# Patient Record
Sex: MALE | Race: WHITE | Employment: OTHER | URBAN - METROPOLITAN AREA
[De-identification: names, ages, dates, MRNs, and addresses within clinical notes are randomized per-mention and may not be internally consistent; named-entity substitution may affect disease eponyms.]

---

## 2017-10-06 PROBLEM — K04.01 CHRONIC PULPITIS: Chronic | Status: ACTIVE | Noted: 2017-10-06

## 2017-10-06 PROBLEM — K08.89 ODONTALGIA: Chronic | Status: ACTIVE | Noted: 2017-10-06

## 2017-10-06 PROBLEM — K02.9 DENTAL CARIES EXTENDING INTO PULP: Chronic | Status: ACTIVE | Noted: 2017-10-06

## 2018-08-01 ENCOUNTER — OFFICE VISIT (OUTPATIENT)
Dept: CARDIOLOGY CLINIC | Age: 68
End: 2018-08-01
Payer: MEDICARE

## 2018-08-01 VITALS
HEIGHT: 71 IN | SYSTOLIC BLOOD PRESSURE: 98 MMHG | RESPIRATION RATE: 16 BRPM | DIASTOLIC BLOOD PRESSURE: 60 MMHG | HEART RATE: 61 BPM | BODY MASS INDEX: 27.83 KG/M2 | WEIGHT: 198.8 LBS

## 2018-08-01 DIAGNOSIS — I51.9 HEART PROBLEM: Primary | ICD-10-CM

## 2018-08-01 PROCEDURE — 1101F PT FALLS ASSESS-DOCD LE1/YR: CPT | Performed by: INTERNAL MEDICINE

## 2018-08-01 PROCEDURE — 1036F TOBACCO NON-USER: CPT | Performed by: INTERNAL MEDICINE

## 2018-08-01 PROCEDURE — 93000 ELECTROCARDIOGRAM COMPLETE: CPT | Performed by: INTERNAL MEDICINE

## 2018-08-01 PROCEDURE — 99214 OFFICE O/P EST MOD 30 MIN: CPT | Performed by: INTERNAL MEDICINE

## 2018-08-01 PROCEDURE — G8598 ASA/ANTIPLAT THER USED: HCPCS | Performed by: INTERNAL MEDICINE

## 2018-08-01 PROCEDURE — G8417 CALC BMI ABV UP PARAM F/U: HCPCS | Performed by: INTERNAL MEDICINE

## 2018-08-01 PROCEDURE — 3017F COLORECTAL CA SCREEN DOC REV: CPT | Performed by: INTERNAL MEDICINE

## 2018-08-01 PROCEDURE — G8427 DOCREV CUR MEDS BY ELIG CLIN: HCPCS | Performed by: INTERNAL MEDICINE

## 2018-08-01 PROCEDURE — 4040F PNEUMOC VAC/ADMIN/RCVD: CPT | Performed by: INTERNAL MEDICINE

## 2018-08-01 PROCEDURE — 1123F ACP DISCUSS/DSCN MKR DOCD: CPT | Performed by: INTERNAL MEDICINE

## 2018-08-01 NOTE — PROGRESS NOTES
CHIEF COMPLAINT: CAD, CP and SOB    HPI: Patient is a 69 y.o. male seen at the request of Cici Agosto DO. Patient with history of CAD having had BMS to RCA 12/03. Patient seen in follow up. No CP or SOB. Past Medical History:   Diagnosis Date    Allergic rhinitis     Arthritis     Baker's cyst of knee     right    CAD (coronary artery disease)     follows with Dr. Lynn Bird caries     Depression     [facility]) [facility]     GERD (gastroesophageal reflux disease)     Heart murmur     Hyperlipidemia     Hypertension     Intellectual delay     able to write name, limited reading / signs for self    PONV (postoperative nausea and vomiting)     Preoperative clearance 08/01/2017    cardiac clearance from Dr. Marvin Hurt in EPIC notes    Prostate enlargement     Schizo affective schizophrenia ([facility]     stable    Seasonal allergies     Sleep apnea     no use cpap    Speech disorder     since birth   [facility] Vitamin D deficiency        Patient Active Problem List   Diagnosis    Post PTCA    CAD (coronary artery disease)    Fatigue    Obesity    Hypogonadism male    Prediabetes    Hyperlipemia    High triglycerides    Primary osteoarthritis of left knee    Dental caries extending into pulp    Odontalgia    Chronic pulpitis       No Known Allergies    Current Outpatient Prescriptions   Medication Sig Dispense Refill    risperiDONE (RISPERDAL) 0.25 MG tablet TAKE 1 TABLET BY MOUTH TWICE DAILY 180 tablet 0    omeprazole (PRILOSEC) 40 MG delayed release capsule Take 40 mg by mouth daily Instructed to take morning of surgery with a sip of water      finasteride (PROSCAR) 5 MG tablet Take 5 mg by mouth daily       HYDROcodone-acetaminophen (NORCO) 7.5-325 MG per tablet Take 1 tablet by mouth every 6 hours as needed for Pain  Instructed to take morning of surgery with a sip of water if needs.       vitamin D (ERGOCALCIFEROL) 56936 UNITS CAPS capsule TK ONE C PO  ONCE

## 2018-08-07 ENCOUNTER — HOSPITAL ENCOUNTER (OUTPATIENT)
Age: 68
Discharge: HOME | End: 2018-08-09
Payer: MEDICARE

## 2018-08-07 LAB
BASOPHILS ABSOLUTE: 0.04 E9/L (ref 0–0.2)
BASOPHILS RELATIVE PERCENT: 0.6 % (ref 0–2)
EOSINOPHILS ABSOLUTE: 0.35 E9/L (ref 0.05–0.5)
EOSINOPHILS RELATIVE PERCENT: 5.6 % (ref 0–6)
HCT VFR BLD CALC: 37.2 % (ref 37–54)
HEMOGLOBIN: 11.5 G/DL (ref 12.5–16.5)
IMMATURE GRANULOCYTES #: 0.01 E9/L
IMMATURE GRANULOCYTES %: 0.2 % (ref 0–5)
LYMPHOCYTES ABSOLUTE: 1.38 E9/L (ref 1.5–4)
LYMPHOCYTES RELATIVE PERCENT: 22 % (ref 20–42)
MCH RBC QN AUTO: 28.9 PG (ref 26–35)
MCHC RBC AUTO-ENTMCNC: 30.9 % (ref 32–34.5)
MCV RBC AUTO: 93.5 FL (ref 80–99.9)
MONOCYTES ABSOLUTE: 0.51 E9/L (ref 0.1–0.95)
MONOCYTES RELATIVE PERCENT: 8.1 % (ref 2–12)
NEUTROPHILS ABSOLUTE: 3.97 E9/L (ref 1.8–7.3)
NEUTROPHILS RELATIVE PERCENT: 63.5 % (ref 43–80)
PDW BLD-RTO: 13.3 FL (ref 11.5–15)
PLATELET # BLD: 254 E9/L (ref 130–450)
PMV BLD AUTO: 10.9 FL (ref 7–12)
PROSTATE SPECIFIC ANTIGEN: 0.06 NG/ML (ref 0–4)
RBC # BLD: 3.98 E12/L (ref 3.8–5.8)
WBC # BLD: 6.3 E9/L (ref 4.5–11.5)

## 2018-08-07 PROCEDURE — G0103 PSA SCREENING: HCPCS

## 2018-08-07 PROCEDURE — 84436 ASSAY OF TOTAL THYROXINE: CPT

## 2018-08-07 PROCEDURE — 84479 ASSAY OF THYROID (T3 OR T4): CPT

## 2018-08-07 PROCEDURE — 85025 COMPLETE CBC W/AUTO DIFF WBC: CPT

## 2018-08-07 PROCEDURE — 80061 LIPID PANEL: CPT

## 2018-08-07 PROCEDURE — 83036 HEMOGLOBIN GLYCOSYLATED A1C: CPT

## 2018-08-07 PROCEDURE — 80053 COMPREHEN METABOLIC PANEL: CPT

## 2018-08-07 PROCEDURE — 84480 ASSAY TRIIODOTHYRONINE (T3): CPT

## 2018-08-07 PROCEDURE — 84443 ASSAY THYROID STIM HORMONE: CPT

## 2018-08-08 LAB
ALBUMIN SERPL-MCNC: 4.3 G/DL (ref 3.5–5.2)
ALP BLD-CCNC: 36 U/L (ref 40–129)
ALT SERPL-CCNC: 12 U/L (ref 0–40)
ANION GAP SERPL CALCULATED.3IONS-SCNC: 20 MMOL/L (ref 7–16)
AST SERPL-CCNC: 20 U/L (ref 0–39)
BILIRUB SERPL-MCNC: 0.2 MG/DL (ref 0–1.2)
BUN BLDV-MCNC: 18 MG/DL (ref 8–23)
CALCIUM SERPL-MCNC: 10 MG/DL (ref 8.6–10.2)
CHLORIDE BLD-SCNC: 98 MMOL/L (ref 98–107)
CHOLESTEROL, TOTAL: 228 MG/DL (ref 0–199)
CO2: 22 MMOL/L (ref 22–29)
CREAT SERPL-MCNC: 0.9 MG/DL (ref 0.7–1.2)
GFR AFRICAN AMERICAN: >60
GFR NON-AFRICAN AMERICAN: >60 ML/MIN/1.73
GLUCOSE BLD-MCNC: 60 MG/DL (ref 74–109)
HBA1C MFR BLD: 6.1 % (ref 4–5.6)
HDLC SERPL-MCNC: 61 MG/DL
LDL CHOLESTEROL CALCULATED: 146 MG/DL (ref 0–99)
POTASSIUM SERPL-SCNC: 4.7 MMOL/L (ref 3.5–5)
SODIUM BLD-SCNC: 140 MMOL/L (ref 132–146)
T3 TOTAL: 83.27 NG/DL (ref 80–200)
T3 UPTAKE PERCENT: 31.9 % (ref 22.5–37)
T4 TOTAL: 7 MCG/DL (ref 4.5–11.7)
TOTAL PROTEIN: 7.2 G/DL (ref 6.4–8.3)
TRIGL SERPL-MCNC: 106 MG/DL (ref 0–149)
TSH SERPL DL<=0.05 MIU/L-ACNC: 1.73 UIU/ML (ref 0.27–4.2)
VLDLC SERPL CALC-MCNC: 21 MG/DL

## 2018-08-14 ENCOUNTER — TELEPHONE (OUTPATIENT)
Dept: ORTHOPEDIC SURGERY | Age: 68
End: 2018-08-14

## 2018-08-20 ENCOUNTER — OFFICE VISIT (OUTPATIENT)
Dept: ORTHOPEDIC SURGERY | Age: 68
End: 2018-08-20
Payer: MEDICARE

## 2018-08-20 VITALS
HEART RATE: 75 BPM | WEIGHT: 198 LBS | BODY MASS INDEX: 27.72 KG/M2 | SYSTOLIC BLOOD PRESSURE: 113 MMHG | DIASTOLIC BLOOD PRESSURE: 66 MMHG | TEMPERATURE: 97.8 F | HEIGHT: 71 IN

## 2018-08-20 DIAGNOSIS — S76.011A HIP STRAIN, RIGHT, INITIAL ENCOUNTER: Primary | ICD-10-CM

## 2018-08-20 PROCEDURE — 3017F COLORECTAL CA SCREEN DOC REV: CPT | Performed by: ORTHOPAEDIC SURGERY

## 2018-08-20 PROCEDURE — 1101F PT FALLS ASSESS-DOCD LE1/YR: CPT | Performed by: ORTHOPAEDIC SURGERY

## 2018-08-20 PROCEDURE — 99213 OFFICE O/P EST LOW 20 MIN: CPT | Performed by: ORTHOPAEDIC SURGERY

## 2018-08-20 PROCEDURE — G8417 CALC BMI ABV UP PARAM F/U: HCPCS | Performed by: ORTHOPAEDIC SURGERY

## 2018-08-20 PROCEDURE — 4040F PNEUMOC VAC/ADMIN/RCVD: CPT | Performed by: ORTHOPAEDIC SURGERY

## 2018-08-20 PROCEDURE — 1036F TOBACCO NON-USER: CPT | Performed by: ORTHOPAEDIC SURGERY

## 2018-08-20 PROCEDURE — G8427 DOCREV CUR MEDS BY ELIG CLIN: HCPCS | Performed by: ORTHOPAEDIC SURGERY

## 2018-08-20 PROCEDURE — G8598 ASA/ANTIPLAT THER USED: HCPCS | Performed by: ORTHOPAEDIC SURGERY

## 2018-08-20 PROCEDURE — 1123F ACP DISCUSS/DSCN MKR DOCD: CPT | Performed by: ORTHOPAEDIC SURGERY

## 2018-08-20 NOTE — PROGRESS NOTES
Social History Main Topics    Smoking status: Never Smoker    Smokeless tobacco: Never Used    Alcohol use No    Drug use: No    Sexual activity: Not Asked     Other Topics Concern    None     Social History Narrative    ** Merged History Encounter **            Family History   Problem Relation Age of Onset    Diabetes Mother     Heart Disease Father     Cancer Brother     Liver Disease Brother          Review of Systems   No fever, chills, or other constitutional symptoms. No numbness or other neuro symptoms. Ortho Exam No acute distress. Ambulating independently. Mild diffuse soft tissue tenderness about the right hip girdle. Mild discomfort in the distal thigh laterally with right hip rotation but no joint pain or limitation of motion. Physical Exam    Patient is alert and oriented. Well-developed well-nourished. BMI 28  Pupils equal and reactive. Sclerae anicteric. Neck supple  Lungs clear. Cardiac rate and rhythm regular. Abdomen soft and nontender. Skin warm and dry. XRAY: X-ray today AP pelvis with AP and lateral views right hip. No bone lesions seen. Right hip joint space well preserved. No changes in femoral head architecture and again no lesions in the hip or proximal femur. Impression: Mild osteoarthritic changes right hip otherwise negative. ASSESSMENT/PLAN:    Rani Vazquez was seen today for hip pain. Diagnoses and all orders for this visit:    Hip strain, right, initial encounter  -     XR HIP 2-3 VW W PELVIS RIGHT; Future  -     One Ran Silva     Clinically right hip soft tissue strain. Recommend physical therapy for hip exercise program instruction. No orthopedic intervention identified. He will follow-up yearly (Wife request ) or as needed. Return if symptoms worsen or fail to improve.        Dirk Osborn MD    8/20/2018  9:24 AM

## 2018-08-29 ENCOUNTER — HOSPITAL ENCOUNTER (OUTPATIENT)
Dept: PHYSICAL THERAPY | Age: 68
Setting detail: THERAPIES SERIES
Discharge: HOME | End: 2018-08-29
Payer: MEDICARE

## 2018-08-29 PROCEDURE — G8978 MOBILITY CURRENT STATUS: HCPCS | Performed by: PHYSICAL THERAPIST

## 2018-08-29 PROCEDURE — 97161 PT EVAL LOW COMPLEX 20 MIN: CPT | Performed by: PHYSICAL THERAPIST

## 2018-08-29 PROCEDURE — G8979 MOBILITY GOAL STATUS: HCPCS | Performed by: PHYSICAL THERAPIST

## 2018-08-29 ASSESSMENT — PAIN DESCRIPTION - PAIN TYPE: TYPE: ACUTE PAIN

## 2018-08-29 ASSESSMENT — PAIN DESCRIPTION - ORIENTATION: ORIENTATION: RIGHT

## 2018-08-29 ASSESSMENT — PAIN SCALES - GENERAL: PAINLEVEL_OUTOF10: 9

## 2018-08-29 ASSESSMENT — PAIN DESCRIPTION - DESCRIPTORS: DESCRIPTORS: ACHING;CONSTANT

## 2018-08-29 ASSESSMENT — ACTIVITIES OF DAILY LIVING (ADL): EFFECT OF PAIN ON DAILY ACTIVITIES: PAIN HAMPERS PROLONGED STANDING/WALKING AS WELL AS SLEEP AT TIMES

## 2018-08-29 ASSESSMENT — PAIN DESCRIPTION - LOCATION: LOCATION: HIP

## 2018-08-29 NOTE — PROGRESS NOTES
Physical Therapy  Initial Assessment  Date: 2018  Patient Name: Mundo Stewart  MRN: [id number]  : 1950     Subjective   General  Chart Reviewed: Yes  Referring Practitioner: Johan Pope   Referral Date : 18  Diagnosis: R hip st  PT Visit Information  Onset Date: 18  PT Insurance Information: [id number] [employer]                                cert dates:  44  to  18                 ICD-10:  M25.559, R29.90  Total # of Visits Approved: 6  Total # of Visits to Date: 1  Subjective  Subjective: pt presents to therapy with c/o R hip pain for ~ 2 weeks of insidious onset; x-ray R hip done, results unknown; no PMH for B hips/LB per pt; MEDS helping; no c/o N/T or buckling in either leg; sleep is hampered due to pain; no follow-up scheduled at this time   Pain Screening  Patient Currently in Pain: Yes  Pain Assessment  Pain Assessment: 0-10  Pain Level: 9  Pain Type: Acute pain  Pain Location: Hip  Pain Orientation: Right  Pain Descriptors: Aching;Constant  Effect of Pain on Daily Activities: pain hampers prolonged standing/walking as well as sleep at times   Vital Signs  Patient Currently in Pain: Yes     Objective     Observation/Palpation  Palpation: discomfort noted across lateral aspect of R hip into groin   Observation: posture:  level ASIS/PSIS/iliacs; ant pelvic tilt noted     AROM RLE (degrees)  RLE AROM: WNL  AROM LLE (degrees)  LLE AROM : WNL  AROM RUE (degrees)  RUE AROM : WNL  AROM LUE (degrees)  LUE AROM : WNL  Spine  Lumbar: WNL for all ranges with no c/o radiculopathy noted     Strength Other  Other: grossly 4, 4+/5 for all ranges R hip; 5/5 across R knee/ankle      Additional Measures  Special Tests: hip scour  + per pt  Other: endurance for all prolonged activities is GOOD  Sensation  Overall Sensation Status: WNL      Ambulation  Ambulation?: Yes  Ambulation 1  Surface: level tile  Device: No Device  Assistance: Independent  Quality of Gait: normal mechanics and ana cristina noted B LE's/trunk   Balance  Comments: static/dynamic  balance is GOOD+     Assessment   Conditions Requiring Skilled Therapeutic Intervention  Body structures, Functions, Activity limitations: Decreased strength;Decreased endurance  Assessment: pain noted across R hip with all prolonged activities, 9/10   Prognosis: Fair;Good  Decision Making: Low Complexity  Activity Tolerance  Activity Tolerance: Patient Tolerated treatment well      Plan   Plan  Times per week: pt to be seen 1-2x/week/3 weeks   Current Treatment Recommendations: ROM, Strengthening, Endurance Training, Modalities, Home Exercise Program    G-Code  PT G-Codes  Functional Assessment Tool Used: professional judgement   Functional Limitation: Mobility: Walking and moving around  Mobility: Walking and Moving Around Current Status (): At least 1 percent but less than 20 percent impaired, limited or restricted  Mobility: Walking and Moving Around Goal Status (): 0 percent impaired, limited or restricted    Goals  Time Frame for goals: 3 weeks   goal 1: decrease pain across R hip with all prolonged activities, 0-4/10   goal 2: increase strength across R hip to grossly 5/5 for all ranges improving static/dynamic balance to NORMAL   goal 3: assure I with [facility] for home management of condition        Igor Goodwin[address]  T: [phone]   F: [phone]     If you have any questions or concerns, please don't hesitate to call. Thank you for your referral.    Physician Signature:________________________________Date:__________________  By signing above, therapists plan is approved by physician. All patients under [employer]   must be signed by physician.

## 2018-09-05 ENCOUNTER — HOSPITAL ENCOUNTER (OUTPATIENT)
Dept: PHYSICAL THERAPY | Age: 68
Setting detail: THERAPIES SERIES
Discharge: HOME | End: 2018-09-05
Payer: MEDICARE

## 2018-09-05 PROCEDURE — 97110 THERAPEUTIC EXERCISES: CPT | Performed by: PHYSICAL THERAPIST

## 2018-09-05 PROCEDURE — 97530 THERAPEUTIC ACTIVITIES: CPT | Performed by: PHYSICAL THERAPIST

## 2018-09-12 ENCOUNTER — HOSPITAL ENCOUNTER (OUTPATIENT)
Dept: PHYSICAL THERAPY | Age: 68
Setting detail: THERAPIES SERIES
Discharge: HOME | End: 2018-09-12
Payer: MEDICARE

## 2018-09-12 PROCEDURE — 97110 THERAPEUTIC EXERCISES: CPT | Performed by: PHYSICAL THERAPIST

## 2018-09-12 PROCEDURE — 97530 THERAPEUTIC ACTIVITIES: CPT | Performed by: PHYSICAL THERAPIST

## 2018-09-12 NOTE — PROGRESS NOTES
S:  pt presents to therapy for only scheduled visit this week; at this time he reports that his hip feels better and he is moving much easier; pain level down to 4/10 and is much less limiting to him; no c/o buckling or LOB over last week's time; sleep is fine; HEP going well per pt     O:  performed the exercises/tasks as written in the flowsheet for the week ending 9/14/18; initiated HEP for home management of condition; strength across R hip grossly 4, 4+/5 for all ranges     A:  medhat tx well; pt able to perform all requested tasks with good form and pacing noted; strength across R hip grossly stable since eval; gait stable with normal mechanics noted R LE; static/dynamic balance is GOOD+; endurance for all prolonged activities is GOOD     P:  cont with POC of stretching/strengthening for LB/R hip with modalities as needed

## 2018-09-17 ENCOUNTER — HOSPITAL ENCOUNTER (OUTPATIENT)
Dept: PHYSICAL THERAPY | Age: 68
Setting detail: THERAPIES SERIES
Discharge: HOME | End: 2018-09-17
Payer: MEDICARE

## 2018-09-17 PROCEDURE — 97530 THERAPEUTIC ACTIVITIES: CPT | Performed by: PHYSICAL THERAPIST

## 2018-09-17 PROCEDURE — 97110 THERAPEUTIC EXERCISES: CPT | Performed by: PHYSICAL THERAPIST

## 2018-09-21 ENCOUNTER — HOSPITAL ENCOUNTER (OUTPATIENT)
Dept: PHYSICAL THERAPY | Age: 68
Setting detail: THERAPIES SERIES
Discharge: HOME | End: 2018-09-21
Payer: MEDICARE

## 2018-09-21 PROCEDURE — 97530 THERAPEUTIC ACTIVITIES: CPT | Performed by: PHYSICAL THERAPIST

## 2018-09-21 PROCEDURE — 97110 THERAPEUTIC EXERCISES: CPT | Performed by: PHYSICAL THERAPIST

## 2018-09-21 NOTE — PROGRESS NOTES
[address]                Phone: [phone]   Fax: [phone]    Physical Therapy Daily Treatment Note  Date:  2018    Patient Name:  Carlynn Landau    :  1950  MRN: [id number]    Evaluating therapist:  CAROL Landeros                (18)  Restrictions/Precautions:    Diagnosis:  R hip st  Treatment Diagnosis:    Insurance/Certification information:  [employer]                                cert dates:    to  18                 ICD-10:  M25.559, R29.90          (10/13/18)  Referring Physician:  Balaji Redd signed (Y/N):  Y  Visit# / total visits:    Pain level: 210   Time In:  908  Time Out:  1025    Subjective:      Exercises:  Exercise/Equipment Resistance/Repetitions Other comments   StepOne    10 min            tball flex/rot  10x10s           rot st 3x20s    SKTC 3x20s    piriformis st 3x20s           seated hip add 0s04x8s                      abd [id number]                      flex [id number]    sit/stand 2x10           step ups  2x10x6\"    3 way hip  1x20  ea                                  Other Therapeutic Activities:      Home Exercise Program:  provided 18    Manual Treatments:      Modalities:  MH to R hip x 15 min      Timed Code Treatment Minutes: Total Treatment Minutes:      Treatment/Activity Tolerance:  [] Patient tolerated treatment well [] Patient limited by fatique  [] Patient limited by pain  [] Patient limited by other medical complications  [] Other:     Prognosis: [] Good [] Fair  [] Poor    Patient Requires Follow-up: [] Yes  [] No    Plan:   [] Continue per plan of care [] Alter current plan (see comments)  [] Plan of care initiated [] Hold pending MD visit [] Discharge  Plan for Next Session:       See Weekly Progress Note: []  Yes  []  No  Next due:        Electronically signed by:   Genevieve Saucedo

## 2018-09-24 ENCOUNTER — OFFICE VISIT (OUTPATIENT)
Dept: ORTHOPEDIC SURGERY | Age: 68
End: 2018-09-24
Payer: MEDICARE

## 2018-09-24 VITALS
BODY MASS INDEX: 27.72 KG/M2 | SYSTOLIC BLOOD PRESSURE: 110 MMHG | HEART RATE: 81 BPM | TEMPERATURE: 97.6 F | WEIGHT: 198 LBS | HEIGHT: 71 IN | DIASTOLIC BLOOD PRESSURE: 69 MMHG

## 2018-09-24 DIAGNOSIS — M17.12 PRIMARY OSTEOARTHRITIS OF LEFT KNEE: Primary | ICD-10-CM

## 2018-09-24 DIAGNOSIS — M25.552 PAIN OF BOTH HIP JOINTS: ICD-10-CM

## 2018-09-24 DIAGNOSIS — M25.551 PAIN OF BOTH HIP JOINTS: ICD-10-CM

## 2018-09-24 DIAGNOSIS — M54.50 LUMBAR PAIN: ICD-10-CM

## 2018-09-24 PROCEDURE — G8598 ASA/ANTIPLAT THER USED: HCPCS | Performed by: ORTHOPAEDIC SURGERY

## 2018-09-24 PROCEDURE — G8417 CALC BMI ABV UP PARAM F/U: HCPCS | Performed by: ORTHOPAEDIC SURGERY

## 2018-09-24 PROCEDURE — G8427 DOCREV CUR MEDS BY ELIG CLIN: HCPCS | Performed by: ORTHOPAEDIC SURGERY

## 2018-09-24 PROCEDURE — 1123F ACP DISCUSS/DSCN MKR DOCD: CPT | Performed by: ORTHOPAEDIC SURGERY

## 2018-09-24 PROCEDURE — 4040F PNEUMOC VAC/ADMIN/RCVD: CPT | Performed by: ORTHOPAEDIC SURGERY

## 2018-09-24 PROCEDURE — 1036F TOBACCO NON-USER: CPT | Performed by: ORTHOPAEDIC SURGERY

## 2018-09-24 PROCEDURE — 1101F PT FALLS ASSESS-DOCD LE1/YR: CPT | Performed by: ORTHOPAEDIC SURGERY

## 2018-09-24 PROCEDURE — 3017F COLORECTAL CA SCREEN DOC REV: CPT | Performed by: ORTHOPAEDIC SURGERY

## 2018-09-24 PROCEDURE — 99214 OFFICE O/P EST MOD 30 MIN: CPT | Performed by: ORTHOPAEDIC SURGERY

## 2018-09-24 RX ORDER — DICLOFENAC SODIUM 75 MG/1
75 TABLET, DELAYED RELEASE ORAL 2 TIMES DAILY
Qty: 60 TABLET | Refills: 3 | Status: SHIPPED | OUTPATIENT
Start: 2018-09-24 | End: 2018-09-24 | Stop reason: SDUPTHER

## 2018-09-24 RX ORDER — DICLOFENAC SODIUM 75 MG/1
TABLET, DELAYED RELEASE ORAL
Qty: 180 TABLET | Refills: 3 | Status: SHIPPED | OUTPATIENT
Start: 2018-09-24 | End: 2019-06-25

## 2018-09-25 NOTE — PROGRESS NOTES
Chief Complaint:   Chief Complaint   Patient presents with    Knee Pain     Pt. c/o left knee pain, he is requesting steroid injection. Denies injury. No xray. HPI 55-year-old man scheduled for supposed to primary complaint of left knee pain. However he is here with family states he has pain in multiple areas including both shoulders his back both hips and bilateral knees. Family also states that he has been losing weight. He has seen primary care and apparently no significant issues were confirmed.     Patient Active Problem List   Diagnosis    Post PTCA    CAD (coronary artery disease)    Fatigue    Obesity    Hypogonadism male    Prediabetes    Hyperlipemia    High triglycerides    Primary osteoarthritis of left knee    Dental caries extending into pulp    Odontalgia    Chronic pulpitis       Past Medical History:   Diagnosis Date    Allergic rhinitis     Arthritis     Baker's cyst of knee     right    CAD (coronary artery disease)     follows with Dr. Karley Lloyd caries     Depression     DM (diabetes mellitus) ([address]     GERD (gastroesophageal reflux disease)     Heart murmur     Hyperlipidemia     Hypertension     Intellectual delay     able to write name, limited reading / signs for self    PONV (postoperative nausea and vomiting)     Preoperative clearance 08/01/2017    cardiac clearance from Dr. Anna Marie Aburto in EPIC notes    Prostate enlargement     Schizo affective schizophrenia ([address]     stable    Seasonal allergies     Sleep apnea     no use cpap    Speech disorder     since birth   [facility] Vitamin D deficiency        Past Surgical History:   Procedure Laterality Date    APPENDECTOMY      APPENDECTOMY      CATARACT REMOVAL WITH IMPLANT Bilateral 2014    CORONARY ANGIOPLASTY WITH STENT PLACEMENT  early 2000's    CORONARY ANGIOPLASTY WITH STENT PLACEMENT      CYST REMOVAL      On top of his head   [address] [place] SURGERY  2004

## 2018-09-26 PROCEDURE — G8980 MOBILITY D/C STATUS: HCPCS | Performed by: PHYSICAL THERAPIST

## 2018-09-26 PROCEDURE — G8979 MOBILITY GOAL STATUS: HCPCS | Performed by: PHYSICAL THERAPIST

## 2018-10-01 ENCOUNTER — HOSPITAL ENCOUNTER (OUTPATIENT)
Age: 68
Discharge: HOME | End: 2018-10-01
Payer: MEDICARE

## 2018-10-01 LAB — PROSTATE SPECIFIC ANTIGEN: 0.09 NG/ML (ref 0–4)

## 2018-10-01 PROCEDURE — 36415 COLL VENOUS BLD VENIPUNCTURE: CPT

## 2018-10-01 PROCEDURE — 84153 ASSAY OF PSA TOTAL: CPT

## 2018-11-05 ENCOUNTER — HOSPITAL ENCOUNTER (OUTPATIENT)
Age: 68
Discharge: HOME | End: 2018-11-07

## 2018-11-05 PROCEDURE — 88342 IMHCHEM/IMCYTCHM 1ST ANTB: CPT

## 2018-11-05 PROCEDURE — 88305 TISSUE EXAM BY PATHOLOGIST: CPT

## 2018-11-07 ENCOUNTER — HOSPITAL ENCOUNTER (OUTPATIENT)
Age: 68
Discharge: HOME | End: 2018-11-09
Payer: MEDICARE

## 2018-11-07 LAB
HBA1C MFR BLD: 6.1 % (ref 4–5.6)
HBA1C MFR BLD: 6.1 % (ref 4–5.6)

## 2018-11-07 PROCEDURE — 85025 COMPLETE CBC W/AUTO DIFF WBC: CPT

## 2018-11-07 PROCEDURE — 80053 COMPREHEN METABOLIC PANEL: CPT

## 2018-11-07 PROCEDURE — 80061 LIPID PANEL: CPT

## 2018-11-07 PROCEDURE — 83036 HEMOGLOBIN GLYCOSYLATED A1C: CPT

## 2018-11-08 LAB
ALBUMIN SERPL-MCNC: 3.7 G/DL (ref 3.5–5.2)
ALBUMIN SERPL-MCNC: 3.7 G/DL (ref 3.5–5.2)
ALP BLD-CCNC: 56 U/L (ref 40–129)
ALP BLD-CCNC: 56 U/L (ref 40–129)
ALT SERPL-CCNC: 20 U/L (ref 0–40)
ALT SERPL-CCNC: 20 U/L (ref 0–40)
ANION GAP SERPL CALCULATED.3IONS-SCNC: 21 MMOL/L (ref 7–16)
ANION GAP SERPL CALCULATED.3IONS-SCNC: 21 MMOL/L (ref 7–16)
ANISOCYTOSIS: ABNORMAL
ANISOCYTOSIS: ABNORMAL
AST SERPL-CCNC: 17 U/L (ref 0–39)
AST SERPL-CCNC: 17 U/L (ref 0–39)
BASOPHILS ABSOLUTE: 0.03 E9/L (ref 0–0.2)
BASOPHILS ABSOLUTE: 0.03 E9/L (ref 0–0.2)
BASOPHILS RELATIVE PERCENT: 0.4 % (ref 0–2)
BASOPHILS RELATIVE PERCENT: 0.4 % (ref 0–2)
BILIRUB SERPL-MCNC: 0.4 MG/DL (ref 0–1.2)
BILIRUB SERPL-MCNC: 0.4 MG/DL (ref 0–1.2)
BUN BLDV-MCNC: 14 MG/DL (ref 8–23)
BUN BLDV-MCNC: 14 MG/DL (ref 8–23)
BURR CELLS: ABNORMAL
BURR CELLS: ABNORMAL
CALCIUM SERPL-MCNC: 9.8 MG/DL (ref 8.6–10.2)
CALCIUM SERPL-MCNC: 9.8 MG/DL (ref 8.6–10.2)
CHLORIDE BLD-SCNC: 99 MMOL/L (ref 98–107)
CHLORIDE BLD-SCNC: 99 MMOL/L (ref 98–107)
CHOLESTEROL, TOTAL: 138 MG/DL (ref 0–199)
CHOLESTEROL, TOTAL: 138 MG/DL (ref 0–199)
CO2: 19 MMOL/L (ref 22–29)
CO2: 19 MMOL/L (ref 22–29)
CREAT SERPL-MCNC: 0.8 MG/DL (ref 0.7–1.2)
CREAT SERPL-MCNC: 0.8 MG/DL (ref 0.7–1.2)
EOSINOPHILS ABSOLUTE: 0.17 E9/L (ref 0.05–0.5)
EOSINOPHILS ABSOLUTE: 0.17 E9/L (ref 0.05–0.5)
EOSINOPHILS RELATIVE PERCENT: 2.1 % (ref 0–6)
EOSINOPHILS RELATIVE PERCENT: 2.1 % (ref 0–6)
GFR AFRICAN AMERICAN: >60
GFR AFRICAN AMERICAN: >60
GFR NON-AFRICAN AMERICAN: >60 ML/MIN/1.73
GFR NON-AFRICAN AMERICAN: >60 ML/MIN/1.73
GLUCOSE BLD-MCNC: 90 MG/DL (ref 74–99)
GLUCOSE BLD-MCNC: 90 MG/DL (ref 74–99)
HCT VFR BLD CALC: 29.2 % (ref 37–54)
HCT VFR BLD CALC: 29.2 % (ref 37–54)
HDLC SERPL-MCNC: 42 MG/DL
HDLC SERPL-MCNC: 42 MG/DL
HEMOGLOBIN: 8.3 G/DL (ref 12.5–16.5)
HEMOGLOBIN: 8.3 G/DL (ref 12.5–16.5)
HYPOCHROMIA: ABNORMAL
HYPOCHROMIA: ABNORMAL
IMMATURE GRANULOCYTES #: 0.03 E9/L
IMMATURE GRANULOCYTES #: 0.03 E9/L
IMMATURE GRANULOCYTES %: 0.4 % (ref 0–5)
IMMATURE GRANULOCYTES %: 0.4 % (ref 0–5)
LDL CHOLESTEROL CALCULATED: 79 MG/DL (ref 0–99)
LDL CHOLESTEROL CALCULATED: 79 MG/DL (ref 0–99)
LYMPHOCYTES ABSOLUTE: 1.44 E9/L (ref 1.5–4)
LYMPHOCYTES ABSOLUTE: 1.44 E9/L (ref 1.5–4)
LYMPHOCYTES RELATIVE PERCENT: 17.5 % (ref 20–42)
LYMPHOCYTES RELATIVE PERCENT: 17.5 % (ref 20–42)
MCH RBC QN AUTO: 22.6 PG (ref 26–35)
MCH RBC QN AUTO: 22.6 PG (ref 26–35)
MCHC RBC AUTO-ENTMCNC: 28.4 % (ref 32–34.5)
MCHC RBC AUTO-ENTMCNC: 28.4 % (ref 32–34.5)
MCV RBC AUTO: 79.6 FL (ref 80–99.9)
MCV RBC AUTO: 79.6 FL (ref 80–99.9)
MONOCYTES ABSOLUTE: 0.57 E9/L (ref 0.1–0.95)
MONOCYTES ABSOLUTE: 0.57 E9/L (ref 0.1–0.95)
MONOCYTES RELATIVE PERCENT: 6.9 % (ref 2–12)
MONOCYTES RELATIVE PERCENT: 6.9 % (ref 2–12)
NEUTROPHILS ABSOLUTE: 5.97 E9/L (ref 1.8–7.3)
NEUTROPHILS ABSOLUTE: 5.97 E9/L (ref 1.8–7.3)
NEUTROPHILS RELATIVE PERCENT: 72.7 % (ref 43–80)
NEUTROPHILS RELATIVE PERCENT: 72.7 % (ref 43–80)
OVALOCYTES: ABNORMAL
OVALOCYTES: ABNORMAL
PDW BLD-RTO: 15.8 FL (ref 11.5–15)
PDW BLD-RTO: 15.8 FL (ref 11.5–15)
PLATELET # BLD: 663 E9/L (ref 130–450)
PLATELET # BLD: 663 E9/L (ref 130–450)
PMV BLD AUTO: 9.9 FL (ref 7–12)
PMV BLD AUTO: 9.9 FL (ref 7–12)
POIKILOCYTES: ABNORMAL
POIKILOCYTES: ABNORMAL
POLYCHROMASIA: ABNORMAL
POLYCHROMASIA: ABNORMAL
POTASSIUM SERPL-SCNC: 4.4 MMOL/L (ref 3.5–5)
POTASSIUM SERPL-SCNC: 4.4 MMOL/L (ref 3.5–5)
RBC # BLD: 3.67 E12/L (ref 3.8–5.8)
RBC # BLD: 3.67 E12/L (ref 3.8–5.8)
SODIUM BLD-SCNC: 139 MMOL/L (ref 132–146)
SODIUM BLD-SCNC: 139 MMOL/L (ref 132–146)
TEAR DROP CELLS: ABNORMAL
TEAR DROP CELLS: ABNORMAL
TOTAL PROTEIN: 7.7 G/DL (ref 6.4–8.3)
TOTAL PROTEIN: 7.7 G/DL (ref 6.4–8.3)
TRIGL SERPL-MCNC: 86 MG/DL (ref 0–149)
TRIGL SERPL-MCNC: 86 MG/DL (ref 0–149)
VACUOLATED NEUTROPHILS: ABNORMAL
VACUOLATED NEUTROPHILS: ABNORMAL
VLDLC SERPL CALC-MCNC: 17 MG/DL
VLDLC SERPL CALC-MCNC: 17 MG/DL
WBC # BLD: 8.2 E9/L (ref 4.5–11.5)
WBC # BLD: 8.2 E9/L (ref 4.5–11.5)

## 2018-12-03 ENCOUNTER — TELEPHONE (OUTPATIENT)
Dept: ORTHOPEDIC SURGERY | Age: 68
End: 2018-12-03

## 2018-12-17 ENCOUNTER — HOSPITAL ENCOUNTER (OUTPATIENT)
Age: 68
Discharge: HOME | End: 2018-12-19
Payer: MEDICARE

## 2018-12-17 PROCEDURE — 87088 URINE BACTERIA CULTURE: CPT

## 2018-12-20 LAB — URINE CULTURE, ROUTINE: NORMAL
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
## 2019-01-16 PROBLEM — M71.21 BAKER'S CYST OF KNEE, RIGHT: Status: ACTIVE | Noted: 2019-01-16

## 2019-01-16 PROBLEM — M17.11 PRIMARY OSTEOARTHRITIS OF RIGHT KNEE: Status: ACTIVE | Noted: 2019-01-16

## 2019-03-20 ENCOUNTER — HOSPITAL ENCOUNTER (OUTPATIENT)
Age: 69
Discharge: HOME | End: 2019-03-22
Payer: MEDICARE

## 2019-03-20 DIAGNOSIS — E78.2 MIXED HYPERLIPIDEMIA: ICD-10-CM

## 2019-03-20 DIAGNOSIS — E55.9 VITAMIN D DEFICIENCY: ICD-10-CM

## 2019-03-20 DIAGNOSIS — R53.83 FATIGUE, UNSPECIFIED TYPE: ICD-10-CM

## 2019-03-20 DIAGNOSIS — R73.03 PREDIABETES: ICD-10-CM

## 2019-03-20 DIAGNOSIS — E78.1 HIGH TRIGLYCERIDES: ICD-10-CM

## 2019-03-20 DIAGNOSIS — E29.1 HYPOGONADISM MALE: ICD-10-CM

## 2019-03-20 LAB
ACANTHOCYTES: ABNORMAL
ALBUMIN SERPL-MCNC: 4.3 G/DL (ref 3.5–5.2)
ALP BLD-CCNC: 45 U/L (ref 40–129)
ALT SERPL-CCNC: 7 U/L (ref 0–40)
ANION GAP SERPL CALCULATED.3IONS-SCNC: 16 MMOL/L (ref 7–16)
ANISOCYTOSIS: ABNORMAL
AST SERPL-CCNC: 20 U/L (ref 0–39)
BASOPHILS ABSOLUTE: 0.04 E9/L (ref 0–0.2)
BASOPHILS RELATIVE PERCENT: 0.6 % (ref 0–2)
BILIRUB SERPL-MCNC: 0.3 MG/DL (ref 0–1.2)
BUN BLDV-MCNC: 29 MG/DL (ref 8–23)
BURR CELLS: ABNORMAL
CALCIUM SERPL-MCNC: 9.9 MG/DL (ref 8.6–10.2)
CHLORIDE BLD-SCNC: 99 MMOL/L (ref 98–107)
CHOLESTEROL, TOTAL: 173 MG/DL (ref 0–199)
CO2: 25 MMOL/L (ref 22–29)
CREAT SERPL-MCNC: 0.9 MG/DL (ref 0.7–1.2)
EOSINOPHILS ABSOLUTE: 0.72 E9/L (ref 0.05–0.5)
EOSINOPHILS RELATIVE PERCENT: 11.2 % (ref 0–6)
GFR AFRICAN AMERICAN: >60
GFR NON-AFRICAN AMERICAN: >60 ML/MIN/1.73
GLUCOSE FASTING: 66 MG/DL (ref 74–99)
HBA1C MFR BLD: 5.4 % (ref 4–5.6)
HCT VFR BLD CALC: 35.6 % (ref 37–54)
HDLC SERPL-MCNC: 54 MG/DL
HEMOGLOBIN: 10.3 G/DL (ref 12.5–16.5)
IMMATURE GRANULOCYTES #: 0.03 E9/L
IMMATURE GRANULOCYTES %: 0.5 % (ref 0–5)
LDL CHOLESTEROL CALCULATED: 101 MG/DL (ref 0–99)
LYMPHOCYTES ABSOLUTE: 1.39 E9/L (ref 1.5–4)
LYMPHOCYTES RELATIVE PERCENT: 21.7 % (ref 20–42)
MCH RBC QN AUTO: 24.1 PG (ref 26–35)
MCHC RBC AUTO-ENTMCNC: 28.9 % (ref 32–34.5)
MCV RBC AUTO: 83.4 FL (ref 80–99.9)
MONOCYTES ABSOLUTE: 0.42 E9/L (ref 0.1–0.95)
MONOCYTES RELATIVE PERCENT: 6.6 % (ref 2–12)
NEUTROPHILS ABSOLUTE: 3.81 E9/L (ref 1.8–7.3)
NEUTROPHILS RELATIVE PERCENT: 59.4 % (ref 43–80)
OVALOCYTES: ABNORMAL
PDW BLD-RTO: 21.2 FL (ref 11.5–15)
PLATELET # BLD: 337 E9/L (ref 130–450)
PMV BLD AUTO: 10.4 FL (ref 7–12)
POIKILOCYTES: ABNORMAL
POLYCHROMASIA: ABNORMAL
POTASSIUM SERPL-SCNC: 5 MMOL/L (ref 3.5–5)
RBC # BLD: 4.27 E12/L (ref 3.8–5.8)
SODIUM BLD-SCNC: 140 MMOL/L (ref 132–146)
TESTOSTERONE TOTAL: 55.3 NG/DL
TOTAL PROTEIN: 8 G/DL (ref 6.4–8.3)
TRIGL SERPL-MCNC: 92 MG/DL (ref 0–149)
VITAMIN D 25-HYDROXY: 30 NG/ML (ref 30–100)
VLDLC SERPL CALC-MCNC: 18 MG/DL
WBC # BLD: 6.4 E9/L (ref 4.5–11.5)

## 2019-03-20 PROCEDURE — 80061 LIPID PANEL: CPT

## 2019-03-20 PROCEDURE — 85025 COMPLETE CBC W/AUTO DIFF WBC: CPT

## 2019-03-20 PROCEDURE — 84403 ASSAY OF TOTAL TESTOSTERONE: CPT

## 2019-03-20 PROCEDURE — 83036 HEMOGLOBIN GLYCOSYLATED A1C: CPT

## 2019-03-20 PROCEDURE — 80053 COMPREHEN METABOLIC PANEL: CPT

## 2019-03-20 PROCEDURE — 82306 VITAMIN D 25 HYDROXY: CPT

## 2019-03-25 PROBLEM — E11.9 TYPE 2 DIABETES MELLITUS WITHOUT COMPLICATION, WITHOUT LONG-TERM CURRENT USE OF INSULIN (HCC): Status: ACTIVE | Noted: 2019-03-25

## 2019-06-25 ENCOUNTER — HOSPITAL ENCOUNTER (OUTPATIENT)
Age: 69
Discharge: HOME | End: 2019-06-27
Payer: MEDICARE

## 2019-06-25 DIAGNOSIS — I25.10 CORONARY ARTERY DISEASE INVOLVING NATIVE CORONARY ARTERY OF NATIVE HEART WITHOUT ANGINA PECTORIS: ICD-10-CM

## 2019-06-25 PROBLEM — E11.9 TYPE 2 DIABETES MELLITUS WITHOUT COMPLICATION, WITHOUT LONG-TERM CURRENT USE OF INSULIN (HCC): Status: RESOLVED | Noted: 2019-03-25 | Resolved: 2019-06-25

## 2019-06-25 LAB
ALBUMIN SERPL-MCNC: 4.6 G/DL (ref 3.5–5.2)
ALP BLD-CCNC: 48 U/L (ref 40–129)
ALT SERPL-CCNC: 14 U/L (ref 0–40)
ANION GAP SERPL CALCULATED.3IONS-SCNC: 17 MMOL/L (ref 7–16)
AST SERPL-CCNC: 19 U/L (ref 0–39)
BASOPHILS ABSOLUTE: 0.04 E9/L (ref 0–0.2)
BASOPHILS RELATIVE PERCENT: 0.7 % (ref 0–2)
BILIRUB SERPL-MCNC: 0.3 MG/DL (ref 0–1.2)
BUN BLDV-MCNC: 21 MG/DL (ref 8–23)
CALCIUM SERPL-MCNC: 10.3 MG/DL (ref 8.6–10.2)
CHLORIDE BLD-SCNC: 98 MMOL/L (ref 98–107)
CHOLESTEROL, TOTAL: 195 MG/DL (ref 0–199)
CO2: 23 MMOL/L (ref 22–29)
CREAT SERPL-MCNC: 1 MG/DL (ref 0.7–1.2)
EOSINOPHILS ABSOLUTE: 0.34 E9/L (ref 0.05–0.5)
EOSINOPHILS RELATIVE PERCENT: 5.9 % (ref 0–6)
GFR AFRICAN AMERICAN: >60
GFR NON-AFRICAN AMERICAN: >60 ML/MIN/1.73
GLUCOSE BLD-MCNC: 79 MG/DL (ref 74–99)
HCT VFR BLD CALC: 40.5 % (ref 37–54)
HDLC SERPL-MCNC: 61 MG/DL
HEMOGLOBIN: 12.5 G/DL (ref 12.5–16.5)
IMMATURE GRANULOCYTES #: 0.02 E9/L
IMMATURE GRANULOCYTES %: 0.3 % (ref 0–5)
LDL CHOLESTEROL CALCULATED: 113 MG/DL (ref 0–99)
LYMPHOCYTES ABSOLUTE: 1.53 E9/L (ref 1.5–4)
LYMPHOCYTES RELATIVE PERCENT: 26.4 % (ref 20–42)
MCH RBC QN AUTO: 28.1 PG (ref 26–35)
MCHC RBC AUTO-ENTMCNC: 30.9 % (ref 32–34.5)
MCV RBC AUTO: 91 FL (ref 80–99.9)
MONOCYTES ABSOLUTE: 0.4 E9/L (ref 0.1–0.95)
MONOCYTES RELATIVE PERCENT: 6.9 % (ref 2–12)
NEUTROPHILS ABSOLUTE: 3.46 E9/L (ref 1.8–7.3)
NEUTROPHILS RELATIVE PERCENT: 59.8 % (ref 43–80)
PDW BLD-RTO: 14.4 FL (ref 11.5–15)
PLATELET # BLD: 266 E9/L (ref 130–450)
PMV BLD AUTO: 11 FL (ref 7–12)
POTASSIUM SERPL-SCNC: 4.5 MMOL/L (ref 3.5–5)
RBC # BLD: 4.45 E12/L (ref 3.8–5.8)
SODIUM BLD-SCNC: 138 MMOL/L (ref 132–146)
TOTAL PROTEIN: 7.8 G/DL (ref 6.4–8.3)
TRIGL SERPL-MCNC: 106 MG/DL (ref 0–149)
VLDLC SERPL CALC-MCNC: 21 MG/DL
WBC # BLD: 5.8 E9/L (ref 4.5–11.5)

## 2019-06-25 PROCEDURE — 80053 COMPREHEN METABOLIC PANEL: CPT

## 2019-06-25 PROCEDURE — 85025 COMPLETE CBC W/AUTO DIFF WBC: CPT

## 2019-06-25 PROCEDURE — 80061 LIPID PANEL: CPT

## 2019-07-29 ENCOUNTER — HOSPITAL ENCOUNTER (OUTPATIENT)
Age: 69
Discharge: HOME | End: 2019-07-31
Payer: MEDICARE

## 2019-07-29 DIAGNOSIS — R35.89 POLYURIA: ICD-10-CM

## 2019-07-29 LAB
BILIRUBIN URINE: NEGATIVE
BLOOD, URINE: NEGATIVE
CLARITY: CLEAR
COLOR: YELLOW
GLUCOSE URINE: NEGATIVE MG/DL
KETONES, URINE: NEGATIVE MG/DL
LEUKOCYTE ESTERASE, URINE: NEGATIVE
NITRITE, URINE: NEGATIVE
PH UA: 6.5 (ref 5–9)
PROTEIN UA: NEGATIVE MG/DL
SPECIFIC GRAVITY UA: 1.01 (ref 1–1.03)
UROBILINOGEN, URINE: 0.2 E.U./DL

## 2019-07-29 PROCEDURE — 81003 URINALYSIS AUTO W/O SCOPE: CPT

## 2019-07-29 PROCEDURE — 87088 URINE BACTERIA CULTURE: CPT

## 2019-08-01 LAB — URINE CULTURE, ROUTINE: NORMAL

## 2019-08-07 PROBLEM — M71.22 BAKER'S CYST OF KNEE, LEFT: Status: ACTIVE | Noted: 2019-08-07

## 2019-08-16 ENCOUNTER — OFFICE VISIT (OUTPATIENT)
Dept: CARDIOLOGY CLINIC | Age: 69
End: 2019-08-16
Payer: MEDICARE

## 2019-08-16 VITALS
RESPIRATION RATE: 16 BRPM | SYSTOLIC BLOOD PRESSURE: 106 MMHG | BODY MASS INDEX: 26.52 KG/M2 | HEIGHT: 72 IN | HEART RATE: 57 BPM | WEIGHT: 195.8 LBS | DIASTOLIC BLOOD PRESSURE: 60 MMHG

## 2019-08-16 DIAGNOSIS — Z01.818 PRE-OPERATIVE CLEARANCE: ICD-10-CM

## 2019-08-16 DIAGNOSIS — I51.9 HEART PROBLEM: Primary | ICD-10-CM

## 2019-08-16 DIAGNOSIS — R06.09 DOE (DYSPNEA ON EXERTION): ICD-10-CM

## 2019-08-16 PROCEDURE — G8427 DOCREV CUR MEDS BY ELIG CLIN: HCPCS | Performed by: INTERNAL MEDICINE

## 2019-08-16 PROCEDURE — G8417 CALC BMI ABV UP PARAM F/U: HCPCS | Performed by: INTERNAL MEDICINE

## 2019-08-16 PROCEDURE — 3017F COLORECTAL CA SCREEN DOC REV: CPT | Performed by: INTERNAL MEDICINE

## 2019-08-16 PROCEDURE — G8598 ASA/ANTIPLAT THER USED: HCPCS | Performed by: INTERNAL MEDICINE

## 2019-08-16 PROCEDURE — 93000 ELECTROCARDIOGRAM COMPLETE: CPT | Performed by: INTERNAL MEDICINE

## 2019-08-16 PROCEDURE — 4040F PNEUMOC VAC/ADMIN/RCVD: CPT | Performed by: INTERNAL MEDICINE

## 2019-08-16 PROCEDURE — 99214 OFFICE O/P EST MOD 30 MIN: CPT | Performed by: INTERNAL MEDICINE

## 2019-08-16 PROCEDURE — 1123F ACP DISCUSS/DSCN MKR DOCD: CPT | Performed by: INTERNAL MEDICINE

## 2019-08-16 PROCEDURE — 1036F TOBACCO NON-USER: CPT | Performed by: INTERNAL MEDICINE
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
## 2019-08-21 ENCOUNTER — TELEPHONE (OUTPATIENT)
Dept: CARDIOLOGY | Age: 69
End: 2019-08-21

## 2019-08-21 NOTE — TELEPHONE ENCOUNTER
Reminder call for Lexiscn Stress test scheduled 08/23/2019 @ 0700 also bring Metoprolol to restart post stress test and do glucose check prior to leaving home morning of the test.  Electronically signed by Charanjit Norton RN on 8/21/2019 at 11:22 AM

## 2019-08-23 ENCOUNTER — HOSPITAL ENCOUNTER (OUTPATIENT)
Dept: CARDIOLOGY | Age: 69
Discharge: HOME | End: 2019-08-23
Payer: MEDICARE

## 2019-08-23 VITALS
BODY MASS INDEX: 26.14 KG/M2 | SYSTOLIC BLOOD PRESSURE: 108 MMHG | WEIGHT: 193 LBS | HEART RATE: 75 BPM | HEIGHT: 72 IN | DIASTOLIC BLOOD PRESSURE: 60 MMHG

## 2019-08-23 DIAGNOSIS — R06.09 DOE (DYSPNEA ON EXERTION): Primary | ICD-10-CM

## 2019-08-23 DIAGNOSIS — Z01.818 PRE-OPERATIVE CLEARANCE: ICD-10-CM

## 2019-08-23 DIAGNOSIS — I51.9 HEART PROBLEM: ICD-10-CM

## 2019-08-23 PROCEDURE — 78452 HT MUSCLE IMAGE SPECT MULT: CPT

## 2019-08-23 PROCEDURE — 2580000003 HC RX 258: Performed by: INTERNAL MEDICINE

## 2019-08-23 PROCEDURE — 6360000002 HC RX W HCPCS: Performed by: INTERNAL MEDICINE

## 2019-08-23 PROCEDURE — 93017 CV STRESS TEST TRACING ONLY: CPT

## 2019-08-23 PROCEDURE — 3430000000 HC RX DIAGNOSTIC RADIOPHARMACEUTICAL: Performed by: INTERNAL MEDICINE

## 2019-08-23 PROCEDURE — A9500 TC99M SESTAMIBI: HCPCS | Performed by: INTERNAL MEDICINE

## 2019-08-23 RX ORDER — SODIUM CHLORIDE 0.9 % (FLUSH) 0.9 %
10 SYRINGE (ML) INJECTION PRN
Status: DISCONTINUED | OUTPATIENT
Start: 2019-08-23 | End: 2019-08-24 | Stop reason: HOSPADM

## 2019-08-23 RX ADMIN — Medication 10 ML: at 08:02

## 2019-08-23 RX ADMIN — REGADENOSON 0.4 MG: 0.08 INJECTION, SOLUTION INTRAVENOUS at 08:01

## 2019-08-23 RX ADMIN — Medication 10 ML: at 07:05

## 2019-08-23 RX ADMIN — Medication 33.5 MILLICURIE: at 08:01

## 2019-08-23 RX ADMIN — Medication 10.2 MILLICURIE: at 07:04

## 2019-08-23 RX ADMIN — Medication 10 ML: at 08:01

## 2019-08-27 ENCOUNTER — TELEPHONE (OUTPATIENT)
Dept: CARDIOLOGY CLINIC | Age: 69
End: 2019-08-27

## 2019-10-01 ENCOUNTER — TELEPHONE (OUTPATIENT)
Dept: ADMINISTRATIVE | Age: 69
End: 2019-10-01

## 2019-10-02 ENCOUNTER — HOSPITAL ENCOUNTER (OUTPATIENT)
Age: 69
Discharge: HOME | End: 2019-10-02
Payer: MEDICARE

## 2019-10-02 LAB — PROSTATE SPECIFIC ANTIGEN: 0.04 NG/ML (ref 0–4)

## 2019-10-02 PROCEDURE — 36415 COLL VENOUS BLD VENIPUNCTURE: CPT

## 2019-10-02 PROCEDURE — 84153 ASSAY OF PSA TOTAL: CPT

## 2019-10-08 ENCOUNTER — HOSPITAL ENCOUNTER (OUTPATIENT)
Age: 69
Discharge: HOME | End: 2019-10-10
Payer: MEDICARE

## 2019-10-08 DIAGNOSIS — E78.5 HYPERLIPIDEMIA, UNSPECIFIED HYPERLIPIDEMIA TYPE: ICD-10-CM

## 2019-10-08 LAB
ALBUMIN SERPL-MCNC: 4.8 G/DL (ref 3.5–5.2)
ALP BLD-CCNC: 45 U/L (ref 40–129)
ALT SERPL-CCNC: 11 U/L (ref 0–40)
ANION GAP SERPL CALCULATED.3IONS-SCNC: 15 MMOL/L (ref 7–16)
AST SERPL-CCNC: 19 U/L (ref 0–39)
BASOPHILS ABSOLUTE: 0.03 E9/L (ref 0–0.2)
BASOPHILS RELATIVE PERCENT: 0.6 % (ref 0–2)
BILIRUB SERPL-MCNC: 0.4 MG/DL (ref 0–1.2)
BUN BLDV-MCNC: 22 MG/DL (ref 8–23)
CALCIUM SERPL-MCNC: 10.5 MG/DL (ref 8.6–10.2)
CHLORIDE BLD-SCNC: 99 MMOL/L (ref 98–107)
CHOLESTEROL, TOTAL: 252 MG/DL (ref 0–199)
CO2: 24 MMOL/L (ref 22–29)
CREAT SERPL-MCNC: 1 MG/DL (ref 0.7–1.2)
EOSINOPHILS ABSOLUTE: 0.47 E9/L (ref 0.05–0.5)
EOSINOPHILS RELATIVE PERCENT: 8.9 % (ref 0–6)
GFR AFRICAN AMERICAN: >60
GFR NON-AFRICAN AMERICAN: >60 ML/MIN/1.73
GLUCOSE BLD-MCNC: 92 MG/DL (ref 74–99)
HCT VFR BLD CALC: 41.7 % (ref 37–54)
HDLC SERPL-MCNC: 68 MG/DL
HEMOGLOBIN: 12.8 G/DL (ref 12.5–16.5)
IMMATURE GRANULOCYTES #: 0.01 E9/L
IMMATURE GRANULOCYTES %: 0.2 % (ref 0–5)
LDL CHOLESTEROL CALCULATED: 152 MG/DL (ref 0–99)
LYMPHOCYTES ABSOLUTE: 1.53 E9/L (ref 1.5–4)
LYMPHOCYTES RELATIVE PERCENT: 28.9 % (ref 20–42)
MCH RBC QN AUTO: 29.2 PG (ref 26–35)
MCHC RBC AUTO-ENTMCNC: 30.7 % (ref 32–34.5)
MCV RBC AUTO: 95 FL (ref 80–99.9)
MONOCYTES ABSOLUTE: 0.46 E9/L (ref 0.1–0.95)
MONOCYTES RELATIVE PERCENT: 8.7 % (ref 2–12)
NEUTROPHILS ABSOLUTE: 2.8 E9/L (ref 1.8–7.3)
NEUTROPHILS RELATIVE PERCENT: 52.7 % (ref 43–80)
PDW BLD-RTO: 14.2 FL (ref 11.5–15)
PLATELET # BLD: 252 E9/L (ref 130–450)
PMV BLD AUTO: 10.8 FL (ref 7–12)
POTASSIUM SERPL-SCNC: 4.7 MMOL/L (ref 3.5–5)
RBC # BLD: 4.39 E12/L (ref 3.8–5.8)
SODIUM BLD-SCNC: 138 MMOL/L (ref 132–146)
TOTAL PROTEIN: 8.1 G/DL (ref 6.4–8.3)
TRIGL SERPL-MCNC: 162 MG/DL (ref 0–149)
VLDLC SERPL CALC-MCNC: 32 MG/DL
WBC # BLD: 5.3 E9/L (ref 4.5–11.5)

## 2019-10-08 PROCEDURE — 85025 COMPLETE CBC W/AUTO DIFF WBC: CPT

## 2019-10-08 PROCEDURE — 80061 LIPID PANEL: CPT

## 2019-10-08 PROCEDURE — 80053 COMPREHEN METABOLIC PANEL: CPT

## 2020-01-06 ENCOUNTER — HOSPITAL ENCOUNTER (OUTPATIENT)
Age: 70
Discharge: HOME | End: 2020-01-08
Payer: MEDICARE

## 2020-01-06 PROBLEM — K04.01 CHRONIC PULPITIS: Chronic | Status: RESOLVED | Noted: 2017-10-06 | Resolved: 2020-01-06

## 2020-01-06 PROBLEM — K02.9 DENTAL CARIES EXTENDING INTO PULP: Chronic | Status: RESOLVED | Noted: 2017-10-06 | Resolved: 2020-01-06

## 2020-01-06 PROBLEM — K08.89 ODONTALGIA: Chronic | Status: RESOLVED | Noted: 2017-10-06 | Resolved: 2020-01-06

## 2020-01-06 PROCEDURE — 80053 COMPREHEN METABOLIC PANEL: CPT

## 2020-01-06 PROCEDURE — 80061 LIPID PANEL: CPT

## 2020-01-06 PROCEDURE — 81003 URINALYSIS AUTO W/O SCOPE: CPT

## 2020-01-06 PROCEDURE — 85025 COMPLETE CBC W/AUTO DIFF WBC: CPT

## 2020-01-07 LAB
ALBUMIN SERPL-MCNC: 4.6 G/DL (ref 3.5–5.2)
ALP BLD-CCNC: 41 U/L (ref 40–129)
ALT SERPL-CCNC: 15 U/L (ref 0–40)
ANION GAP SERPL CALCULATED.3IONS-SCNC: 17 MMOL/L (ref 7–16)
AST SERPL-CCNC: 23 U/L (ref 0–39)
BASOPHILS ABSOLUTE: 0.04 E9/L (ref 0–0.2)
BASOPHILS RELATIVE PERCENT: 0.8 % (ref 0–2)
BILIRUB SERPL-MCNC: 0.4 MG/DL (ref 0–1.2)
BILIRUBIN URINE: NEGATIVE
BLOOD, URINE: NEGATIVE
BUN BLDV-MCNC: 21 MG/DL (ref 8–23)
CALCIUM SERPL-MCNC: 10.6 MG/DL (ref 8.6–10.2)
CHLORIDE BLD-SCNC: 103 MMOL/L (ref 98–107)
CHOLESTEROL, TOTAL: 196 MG/DL (ref 0–199)
CLARITY: CLEAR
CO2: 23 MMOL/L (ref 22–29)
COLOR: YELLOW
CREAT SERPL-MCNC: 1 MG/DL (ref 0.7–1.2)
EOSINOPHILS ABSOLUTE: 0.5 E9/L (ref 0.05–0.5)
EOSINOPHILS RELATIVE PERCENT: 9.5 % (ref 0–6)
GFR AFRICAN AMERICAN: >60
GFR NON-AFRICAN AMERICAN: >60 ML/MIN/1.73
GLUCOSE BLD-MCNC: 84 MG/DL (ref 74–99)
GLUCOSE URINE: NEGATIVE MG/DL
HCT VFR BLD CALC: 41.2 % (ref 37–54)
HDLC SERPL-MCNC: 57 MG/DL
HEMOGLOBIN: 12.8 G/DL (ref 12.5–16.5)
IMMATURE GRANULOCYTES #: 0.02 E9/L
IMMATURE GRANULOCYTES %: 0.4 % (ref 0–5)
KETONES, URINE: NEGATIVE MG/DL
LDL CHOLESTEROL CALCULATED: 112 MG/DL (ref 0–99)
LEUKOCYTE ESTERASE, URINE: NEGATIVE
LYMPHOCYTES ABSOLUTE: 1.64 E9/L (ref 1.5–4)
LYMPHOCYTES RELATIVE PERCENT: 31.2 % (ref 20–42)
MCH RBC QN AUTO: 29.6 PG (ref 26–35)
MCHC RBC AUTO-ENTMCNC: 31.1 % (ref 32–34.5)
MCV RBC AUTO: 95.2 FL (ref 80–99.9)
MONOCYTES ABSOLUTE: 0.44 E9/L (ref 0.1–0.95)
MONOCYTES RELATIVE PERCENT: 8.4 % (ref 2–12)
NEUTROPHILS ABSOLUTE: 2.61 E9/L (ref 1.8–7.3)
NEUTROPHILS RELATIVE PERCENT: 49.7 % (ref 43–80)
NITRITE, URINE: NEGATIVE
PDW BLD-RTO: 13.1 FL (ref 11.5–15)
PH UA: 6 (ref 5–9)
PLATELET # BLD: 257 E9/L (ref 130–450)
PMV BLD AUTO: 11.4 FL (ref 7–12)
POTASSIUM SERPL-SCNC: 4.5 MMOL/L (ref 3.5–5)
PROTEIN UA: NEGATIVE MG/DL
RBC # BLD: 4.33 E12/L (ref 3.8–5.8)
SODIUM BLD-SCNC: 143 MMOL/L (ref 132–146)
SPECIFIC GRAVITY UA: >=1.03 (ref 1–1.03)
TOTAL PROTEIN: 7.5 G/DL (ref 6.4–8.3)
TRIGL SERPL-MCNC: 133 MG/DL (ref 0–149)
UROBILINOGEN, URINE: 1 E.U./DL
VLDLC SERPL CALC-MCNC: 27 MG/DL
WBC # BLD: 5.3 E9/L (ref 4.5–11.5)

## 2020-06-15 ENCOUNTER — HOSPITAL ENCOUNTER (OUTPATIENT)
Age: 70
Discharge: HOME | End: 2020-06-17
Payer: MEDICARE

## 2020-06-15 LAB
ALBUMIN SERPL-MCNC: 4.8 G/DL (ref 3.5–5.2)
ALP BLD-CCNC: 38 U/L (ref 40–129)
ALT SERPL-CCNC: 16 U/L (ref 0–40)
ANION GAP SERPL CALCULATED.3IONS-SCNC: 16 MMOL/L (ref 7–16)
AST SERPL-CCNC: 27 U/L (ref 0–39)
BASOPHILS ABSOLUTE: 0.05 E9/L (ref 0–0.2)
BASOPHILS RELATIVE PERCENT: 0.9 % (ref 0–2)
BILIRUB SERPL-MCNC: 0.3 MG/DL (ref 0–1.2)
BUN BLDV-MCNC: 18 MG/DL (ref 8–23)
CALCIUM SERPL-MCNC: 10.7 MG/DL (ref 8.6–10.2)
CHLORIDE BLD-SCNC: 103 MMOL/L (ref 98–107)
CHOLESTEROL, TOTAL: 220 MG/DL (ref 0–199)
CO2: 22 MMOL/L (ref 22–29)
CREAT SERPL-MCNC: 1 MG/DL (ref 0.7–1.2)
EOSINOPHILS ABSOLUTE: 0.5 E9/L (ref 0.05–0.5)
EOSINOPHILS RELATIVE PERCENT: 8.5 % (ref 0–6)
GFR AFRICAN AMERICAN: >60
GFR NON-AFRICAN AMERICAN: >60 ML/MIN/1.73
GLUCOSE FASTING: 107 MG/DL (ref 74–99)
HCT VFR BLD CALC: 39.8 % (ref 37–54)
HDLC SERPL-MCNC: 55 MG/DL
HEMOGLOBIN: 12.6 G/DL (ref 12.5–16.5)
IMMATURE GRANULOCYTES #: 0.02 E9/L
IMMATURE GRANULOCYTES %: 0.3 % (ref 0–5)
LDL CHOLESTEROL CALCULATED: 141 MG/DL (ref 0–99)
LYMPHOCYTES ABSOLUTE: 1.15 E9/L (ref 1.5–4)
LYMPHOCYTES RELATIVE PERCENT: 19.6 % (ref 20–42)
MCH RBC QN AUTO: 30.7 PG (ref 26–35)
MCHC RBC AUTO-ENTMCNC: 31.7 % (ref 32–34.5)
MCV RBC AUTO: 96.8 FL (ref 80–99.9)
MONOCYTES ABSOLUTE: 0.4 E9/L (ref 0.1–0.95)
MONOCYTES RELATIVE PERCENT: 6.8 % (ref 2–12)
NEUTROPHILS ABSOLUTE: 3.74 E9/L (ref 1.8–7.3)
NEUTROPHILS RELATIVE PERCENT: 63.9 % (ref 43–80)
PDW BLD-RTO: 13.2 FL (ref 11.5–15)
PLATELET # BLD: 239 E9/L (ref 130–450)
PMV BLD AUTO: 11.1 FL (ref 7–12)
POTASSIUM SERPL-SCNC: 4.8 MMOL/L (ref 3.5–5)
RBC # BLD: 4.11 E12/L (ref 3.8–5.8)
SODIUM BLD-SCNC: 141 MMOL/L (ref 132–146)
TOTAL PROTEIN: 7.6 G/DL (ref 6.4–8.3)
TRIGL SERPL-MCNC: 120 MG/DL (ref 0–149)
VLDLC SERPL CALC-MCNC: 24 MG/DL
WBC # BLD: 5.9 E9/L (ref 4.5–11.5)

## 2020-06-15 PROCEDURE — 80053 COMPREHEN METABOLIC PANEL: CPT

## 2020-06-15 PROCEDURE — 85025 COMPLETE CBC W/AUTO DIFF WBC: CPT

## 2020-06-15 PROCEDURE — 80061 LIPID PANEL: CPT

## 2020-09-23 ENCOUNTER — HOSPITAL ENCOUNTER (OUTPATIENT)
Age: 70
Discharge: HOME | End: 2020-09-25
Payer: MEDICARE

## 2020-09-23 LAB
ALBUMIN SERPL-MCNC: 4.5 G/DL (ref 3.5–5.2)
ALP BLD-CCNC: 43 U/L (ref 40–129)
ALT SERPL-CCNC: 17 U/L (ref 0–40)
ANION GAP SERPL CALCULATED.3IONS-SCNC: 21 MMOL/L (ref 7–16)
AST SERPL-CCNC: 27 U/L (ref 0–39)
BASOPHILS ABSOLUTE: 0.02 E9/L (ref 0–0.2)
BASOPHILS RELATIVE PERCENT: 0.5 % (ref 0–2)
BILIRUB SERPL-MCNC: 0.4 MG/DL (ref 0–1.2)
BUN BLDV-MCNC: 23 MG/DL (ref 8–23)
CALCIUM SERPL-MCNC: 10.3 MG/DL (ref 8.6–10.2)
CHLORIDE BLD-SCNC: 103 MMOL/L (ref 98–107)
CHOLESTEROL, TOTAL: 177 MG/DL (ref 0–199)
CO2: 19 MMOL/L (ref 22–29)
CREAT SERPL-MCNC: 0.9 MG/DL (ref 0.7–1.2)
EOSINOPHILS ABSOLUTE: 0.33 E9/L (ref 0.05–0.5)
EOSINOPHILS RELATIVE PERCENT: 7.9 % (ref 0–6)
GFR AFRICAN AMERICAN: >60
GFR NON-AFRICAN AMERICAN: >60 ML/MIN/1.73
GLUCOSE FASTING: 98 MG/DL (ref 74–99)
HCT VFR BLD CALC: 38.3 % (ref 37–54)
HDLC SERPL-MCNC: 65 MG/DL
HEMOGLOBIN: 12.4 G/DL (ref 12.5–16.5)
IMMATURE GRANULOCYTES #: 0.01 E9/L
IMMATURE GRANULOCYTES %: 0.2 % (ref 0–5)
LDL CHOLESTEROL CALCULATED: 95 MG/DL (ref 0–99)
LYMPHOCYTES ABSOLUTE: 1.07 E9/L (ref 1.5–4)
LYMPHOCYTES RELATIVE PERCENT: 25.5 % (ref 20–42)
MCH RBC QN AUTO: 30.7 PG (ref 26–35)
MCHC RBC AUTO-ENTMCNC: 32.4 % (ref 32–34.5)
MCV RBC AUTO: 94.8 FL (ref 80–99.9)
MONOCYTES ABSOLUTE: 0.37 E9/L (ref 0.1–0.95)
MONOCYTES RELATIVE PERCENT: 8.8 % (ref 2–12)
NEUTROPHILS ABSOLUTE: 2.39 E9/L (ref 1.8–7.3)
NEUTROPHILS RELATIVE PERCENT: 57.1 % (ref 43–80)
PDW BLD-RTO: 13.1 FL (ref 11.5–15)
PLATELET # BLD: 197 E9/L (ref 130–450)
PMV BLD AUTO: 11.3 FL (ref 7–12)
POTASSIUM SERPL-SCNC: 4.8 MMOL/L (ref 3.5–5)
PROSTATE SPECIFIC ANTIGEN: 0.03 NG/ML (ref 0–4)
RBC # BLD: 4.04 E12/L (ref 3.8–5.8)
SODIUM BLD-SCNC: 143 MMOL/L (ref 132–146)
TOTAL PROTEIN: 7.2 G/DL (ref 6.4–8.3)
TRIGL SERPL-MCNC: 87 MG/DL (ref 0–149)
VLDLC SERPL CALC-MCNC: 17 MG/DL
WBC # BLD: 4.2 E9/L (ref 4.5–11.5)

## 2020-09-23 PROCEDURE — 80053 COMPREHEN METABOLIC PANEL: CPT

## 2020-09-23 PROCEDURE — 85025 COMPLETE CBC W/AUTO DIFF WBC: CPT

## 2020-09-23 PROCEDURE — G0103 PSA SCREENING: HCPCS

## 2020-09-23 PROCEDURE — 80061 LIPID PANEL: CPT

## 2020-12-21 DIAGNOSIS — Z00.00 ROUTINE GENERAL MEDICAL EXAMINATION AT A HEALTH CARE FACILITY: ICD-10-CM

## 2020-12-21 LAB
ALBUMIN SERPL-MCNC: 4.6 G/DL (ref 3.5–5.2)
ALP BLD-CCNC: 43 U/L (ref 40–129)
ALT SERPL-CCNC: 14 U/L (ref 0–40)
ANION GAP SERPL CALCULATED.3IONS-SCNC: 11 MMOL/L (ref 7–16)
AST SERPL-CCNC: 19 U/L (ref 0–39)
BASOPHILS ABSOLUTE: 0.04 E9/L (ref 0–0.2)
BASOPHILS RELATIVE PERCENT: 0.9 % (ref 0–2)
BILIRUB SERPL-MCNC: 0.4 MG/DL (ref 0–1.2)
BUN BLDV-MCNC: 20 MG/DL (ref 8–23)
CALCIUM SERPL-MCNC: 10.1 MG/DL (ref 8.6–10.2)
CHLORIDE BLD-SCNC: 102 MMOL/L (ref 98–107)
CHOLESTEROL, TOTAL: 207 MG/DL (ref 0–199)
CO2: 26 MMOL/L (ref 22–29)
CREAT SERPL-MCNC: 1 MG/DL (ref 0.7–1.2)
EOSINOPHILS ABSOLUTE: 0.31 E9/L (ref 0.05–0.5)
EOSINOPHILS RELATIVE PERCENT: 7.1 % (ref 0–6)
GFR AFRICAN AMERICAN: >60
GFR NON-AFRICAN AMERICAN: >60 ML/MIN/1.73
GLUCOSE BLD-MCNC: 94 MG/DL (ref 74–99)
HCT VFR BLD CALC: 39.6 % (ref 37–54)
HDLC SERPL-MCNC: 59 MG/DL
HEMOGLOBIN: 12.6 G/DL (ref 12.5–16.5)
IMMATURE GRANULOCYTES #: 0.01 E9/L
IMMATURE GRANULOCYTES %: 0.2 % (ref 0–5)
LDL CHOLESTEROL CALCULATED: 124 MG/DL (ref 0–99)
LYMPHOCYTES ABSOLUTE: 1.17 E9/L (ref 1.5–4)
LYMPHOCYTES RELATIVE PERCENT: 27 % (ref 20–42)
MCH RBC QN AUTO: 30.2 PG (ref 26–35)
MCHC RBC AUTO-ENTMCNC: 31.8 % (ref 32–34.5)
MCV RBC AUTO: 95 FL (ref 80–99.9)
MONOCYTES ABSOLUTE: 0.31 E9/L (ref 0.1–0.95)
MONOCYTES RELATIVE PERCENT: 7.1 % (ref 2–12)
NEUTROPHILS ABSOLUTE: 2.5 E9/L (ref 1.8–7.3)
NEUTROPHILS RELATIVE PERCENT: 57.7 % (ref 43–80)
PDW BLD-RTO: 12.8 FL (ref 11.5–15)
PLATELET # BLD: 205 E9/L (ref 130–450)
PMV BLD AUTO: 11.5 FL (ref 7–12)
POTASSIUM SERPL-SCNC: 4.4 MMOL/L (ref 3.5–5)
RBC # BLD: 4.17 E12/L (ref 3.8–5.8)
SODIUM BLD-SCNC: 139 MMOL/L (ref 132–146)
TOTAL PROTEIN: 7.3 G/DL (ref 6.4–8.3)
TRIGL SERPL-MCNC: 118 MG/DL (ref 0–149)
VLDLC SERPL CALC-MCNC: 24 MG/DL
WBC # BLD: 4.3 E9/L (ref 4.5–11.5)

## 2021-06-16 ENCOUNTER — OFFICE VISIT (OUTPATIENT)
Dept: ENDOCRINOLOGY | Age: 71
End: 2021-06-16
Payer: MEDICARE

## 2021-06-16 VITALS
SYSTOLIC BLOOD PRESSURE: 132 MMHG | OXYGEN SATURATION: 98 % | HEIGHT: 72 IN | WEIGHT: 194 LBS | DIASTOLIC BLOOD PRESSURE: 72 MMHG | RESPIRATION RATE: 18 BRPM | HEART RATE: 84 BPM | BODY MASS INDEX: 26.28 KG/M2

## 2021-06-16 DIAGNOSIS — E29.1 MALE HYPOGONADISM: Primary | ICD-10-CM

## 2021-06-16 DIAGNOSIS — E55.9 VITAMIN D DEFICIENCY: ICD-10-CM

## 2021-06-16 PROCEDURE — 99204 OFFICE O/P NEW MOD 45 MIN: CPT | Performed by: INTERNAL MEDICINE

## 2021-06-16 PROCEDURE — G8417 CALC BMI ABV UP PARAM F/U: HCPCS | Performed by: INTERNAL MEDICINE

## 2021-06-16 PROCEDURE — 1123F ACP DISCUSS/DSCN MKR DOCD: CPT | Performed by: INTERNAL MEDICINE

## 2021-06-16 PROCEDURE — G8428 CUR MEDS NOT DOCUMENT: HCPCS | Performed by: INTERNAL MEDICINE

## 2021-06-16 PROCEDURE — 3017F COLORECTAL CA SCREEN DOC REV: CPT | Performed by: INTERNAL MEDICINE

## 2021-06-16 PROCEDURE — 1036F TOBACCO NON-USER: CPT | Performed by: INTERNAL MEDICINE

## 2021-06-16 PROCEDURE — 4040F PNEUMOC VAC/ADMIN/RCVD: CPT | Performed by: INTERNAL MEDICINE

## 2021-06-16 NOTE — PROGRESS NOTES
[address] [place] [address] Department of Endocrinology Diabetes and Metabolism   [address] [facility] [address]   Phone: [phone]  Fax: [phone]    Date of Service: 6/16/2021    Primary Care Physician: Brien Gleason DO  Referring physician: Boy Carrillo DO  Provider: Lord Corporal AMEZCUA    Reason for the visit:  Male Hypogonadism     History of Present Illness: The history is provided by the patient. No  was used. Accuracy of the patient data is excellent. Heather Johnson is a very pleasant 65 y.o. previously healthy male presents with symptoms of sexual dysfunction. The patient was diagnosed with low Testosterone long time ago and has been on T therapy for many years. His low Testosterone felt to be from chrnic narcotics. Pt is struggling with chronic low back pain and has been on narcotics for many years, he is currently on Norco. . He underwent normal puberty and reports no history of gonadal problems. He has had no gynecomastia or galactorrhea. There is no history of testicular or head trauma and didn't noticed any decrease in his testicular size during this time. He has had no headache or peripheral vision disturbances. The patient denied any change in shaving frequency. He has had no fragility fractures. He ran-out of Testosterone about 5 months ago and currently not on any treatment. Pt reported feeling very tired and fatigue     Testosterone in 3/2019 (while off Therapy) was 55     The patient also denied history of easy bruising, think skin, Plethora, Striae, Acne, muscle weakness, skin tags or increase shoe size.      PAST MEDICAL HISTORY   Past Medical History:   Diagnosis Date    Allergic rhinitis     Arthritis     Baker's cyst of knee     right    CAD (coronary artery disease)     follows with Dr. Johnson Figures caries     Depression     [facility]     GERD (gastroesophageal reflux disease)     Heart murmur Take 1 tablet by mouth 2 times daily 180 tablet 1    metoprolol succinate (TOPROL XL) 25 MG extended release tablet Take 1 tablet by mouth daily 90 tablet 1    risperiDONE (RISPERDAL) 0.25 MG tablet TAKE 1 TABLET BY MOUTH TWICE DAILY 180 tablet 1    vitamin D (ERGOCALCIFEROL) 1.25 MG (46854 UT) CAPS capsule Take 1 capsule by mouth once a week 12 capsule 1    finasteride (PROSCAR) 5 MG tablet Take 5 mg by mouth daily      fenofibrate (TRICOR) 145 MG tablet Take 1 tablet by mouth daily 90 tablet 1    tamsulosin (FLOMAX) 0.4 MG capsule Take 1 capsule by mouth 2 times daily 180 capsule 1    OneTouch Delica Lancets 17U MISC 1 Units by Subconjunctival route daily 90 each 1    blood glucose monitor strips Test qd times a day & as needed for symptoms of irregular blood glucose. Dispense sufficient amount for indicated testing frequency plus additional to accommodate PRN testing needs. 100 strip 3    Multiple Vitamins-Minerals (THERAPEUTIC MULTIVITAMIN-MINERALS) tablet Take 1 tablet by mouth daily 90 tablet 1    nitroGLYCERIN (NITROSTAT) 0.3 MG SL tablet up to max of 3 total doses. If no relief after 1 dose, call 911. 30 tablet 0    testosterone cypionate (DEPOTESTOTERONE CYPIONATE) 200 MG/ML injection INJECT 1 ML IM Q 2 WEEKS  0    dutasteride (AVODART) 0.5 MG capsule Take 0.5 mg by mouth daily.  docusate sodium (COLACE) 100 MG capsule Take 100 mg by mouth daily as needed.  Multiple Vitamin (MULTIVITAMIN) TABS Take 1 tablet by mouth daily 100 tablet 3    citalopram (CELEXA) 40 MG tablet Take 1 tablet by mouth daily 90 tablet 1    aspirin 81 MG EC tablet Take 1 tablet by mouth daily One tablet daily 90 tablet 1     No current facility-administered medications for this visit. Review of Systems  Constitutional: No fever, no chills, no diaphoresis, no generalized weakness.   HEENT: No blurred vision, No sore throat, no ear pain, no hair loss  Neck: denied any neck swelling, difficulty swallowing, Component Value Date/Time     12/21/2020 12:00 PM    K 4.4 12/21/2020 12:00 PM    CO2 26 12/21/2020 12:00 PM    BUN 20 12/21/2020 12:00 PM    CREATININE 1.0 12/21/2020 12:00 PM    CALCIUM 10.1 12/21/2020 12:00 PM    LABGLOM >60 12/21/2020 12:00 PM    GFRAA >60 12/21/2020 12:00 PM      Lab Results   Component Value Date/Time    TSH 1.730 08/07/2018 12:00 PM    T4FREE 1.16 10/14/2014 09:38 AM    O3YSAIP 7.0 08/07/2018 12:00 PM    T0TKCTP 83.27 08/07/2018 12:00 PM     Lab Results   Component Value Date    LABA1C 5.4 03/20/2019    GLUCOSE 94 12/21/2020    GLUCOSE 92 10/09/2010    MALBCR 3.7 10/14/2014    LABMICR 13.4 10/14/2014    LABCREA 362 10/14/2014     Lab Results   Component Value Date    TRIG 118 12/21/2020    HDL 59 12/21/2020    LDLCALC 124 12/21/2020    CHOL 207 12/21/2020     Lab Results   Component Value Date    VITD25 30 03/20/2019     Lab Results   Component Value Date    LABA1C 5.4 03/20/2019    GLUCOSE 94 12/21/2020    GLUCOSE 92 10/09/2010    MALBCR 3.7 10/14/2014    LABMICR 13.4 10/14/2014    LABCREA 362 10/14/2014     Lab Results   Component Value Date    TESTOSTERONE 55.3 03/20/2019    TESTOSTERONE 123 07/11/2013    TESTOSTERONE 120 04/08/2013     No results found for: [facility]  Lab Results   Component Value Date    TESTOSTFR 2.0 09/27/2012     Lab Results   Component Value Date    TESTM 443 09/27/2012     Lab Results   Component Value Date    SHBG 29 09/27/2012     No results found for: [facility]  No results found for: [facility]  No results found for: [facility]  Lab Results   Component Value Date    PSA 0.03 09/23/2020    PSA 0.04 10/02/2019    PSA 0.09 10/01/2018    PSA 0.06 08/07/2018     Lab Results   Component Value Date/Time    VITD25 30 03/20/2019 02:00 PM       ASSESSMENT & RECOMMENDATIONS   Tiff Daniels, a 65 y.o.-old male seen in for management of      Male hypogonadism  · Likely due to chronic narcotics use   · Ran-out of Testosterone Prescription about 5-6 months ago   · Check  AM total/free

## 2021-06-19 PROBLEM — E55.9 VITAMIN D DEFICIENCY: Status: ACTIVE | Noted: 2021-06-19

## 2021-10-27 ENCOUNTER — HOSPITAL ENCOUNTER (EMERGENCY)
Age: 71
Discharge: PSYCHIATRIC OR SUBSTANCE USE FACILITY | End: 2021-10-28
Attending: EMERGENCY MEDICINE
Payer: MEDICARE

## 2021-10-27 DIAGNOSIS — R44.3 HALLUCINATIONS: Primary | ICD-10-CM

## 2021-10-27 DIAGNOSIS — Z91.14 DRUG NONCOMPLIANCE: ICD-10-CM

## 2021-10-27 LAB
ACETAMINOPHEN LEVEL: <5 MCG/ML (ref 10–30)
ALBUMIN SERPL-MCNC: 4.8 G/DL (ref 3.5–5.2)
ALP BLD-CCNC: 39 U/L (ref 40–129)
ALT SERPL-CCNC: 20 U/L (ref 0–40)
AMPHETAMINE SCREEN, URINE: NOT DETECTED
ANION GAP SERPL CALCULATED.3IONS-SCNC: 15 MMOL/L (ref 7–16)
AST SERPL-CCNC: 23 U/L (ref 0–39)
BARBITURATE SCREEN URINE: NOT DETECTED
BASOPHILS ABSOLUTE: 0.02 E9/L (ref 0–0.2)
BASOPHILS RELATIVE PERCENT: 0.4 % (ref 0–2)
BENZODIAZEPINE SCREEN, URINE: NOT DETECTED
BILIRUB SERPL-MCNC: 0.4 MG/DL (ref 0–1.2)
BILIRUBIN URINE: NEGATIVE
BLOOD, URINE: NEGATIVE
BUN BLDV-MCNC: 15 MG/DL (ref 6–23)
CALCIUM SERPL-MCNC: 10.3 MG/DL (ref 8.6–10.2)
CANNABINOID SCREEN URINE: NOT DETECTED
CHLORIDE BLD-SCNC: 99 MMOL/L (ref 98–107)
CLARITY: CLEAR
CO2: 24 MMOL/L (ref 22–29)
COCAINE METABOLITE SCREEN URINE: NOT DETECTED
COLOR: YELLOW
CREAT SERPL-MCNC: 0.9 MG/DL (ref 0.7–1.2)
EOSINOPHILS ABSOLUTE: 0.2 E9/L (ref 0.05–0.5)
EOSINOPHILS RELATIVE PERCENT: 4 % (ref 0–6)
ETHANOL: <10 MG/DL (ref 0–0.08)
FENTANYL SCREEN, URINE: NOT DETECTED
GFR AFRICAN AMERICAN: >60
GFR NON-AFRICAN AMERICAN: >60 ML/MIN/1.73
GLUCOSE BLD-MCNC: 100 MG/DL (ref 74–99)
GLUCOSE URINE: NEGATIVE MG/DL
HCT VFR BLD CALC: 34.4 % (ref 37–54)
HEMOGLOBIN: 11.6 G/DL (ref 12.5–16.5)
IMMATURE GRANULOCYTES #: 0.02 E9/L
IMMATURE GRANULOCYTES %: 0.4 % (ref 0–5)
INFLUENZA A: NOT DETECTED
INFLUENZA B: NOT DETECTED
KETONES, URINE: NEGATIVE MG/DL
LEUKOCYTE ESTERASE, URINE: NEGATIVE
LYMPHOCYTES ABSOLUTE: 1.26 E9/L (ref 1.5–4)
LYMPHOCYTES RELATIVE PERCENT: 25.5 % (ref 20–42)
Lab: NORMAL
MCH RBC QN AUTO: 30.4 PG (ref 26–35)
MCHC RBC AUTO-ENTMCNC: 33.7 % (ref 32–34.5)
MCV RBC AUTO: 90.3 FL (ref 80–99.9)
METHADONE SCREEN, URINE: NOT DETECTED
MONOCYTES ABSOLUTE: 0.35 E9/L (ref 0.1–0.95)
MONOCYTES RELATIVE PERCENT: 7.1 % (ref 2–12)
NEUTROPHILS ABSOLUTE: 3.1 E9/L (ref 1.8–7.3)
NEUTROPHILS RELATIVE PERCENT: 62.6 % (ref 43–80)
NITRITE, URINE: NEGATIVE
OPIATE SCREEN URINE: NOT DETECTED
OXYCODONE URINE: NOT DETECTED
PDW BLD-RTO: 12.6 FL (ref 11.5–15)
PH UA: 5.5 (ref 5–9)
PHENCYCLIDINE SCREEN URINE: NOT DETECTED
PLATELET # BLD: 204 E9/L (ref 130–450)
PMV BLD AUTO: 10.7 FL (ref 7–12)
POTASSIUM SERPL-SCNC: 3.9 MMOL/L (ref 3.5–5)
PROTEIN UA: NEGATIVE MG/DL
RBC # BLD: 3.81 E12/L (ref 3.8–5.8)
REASON FOR REJECTION: NORMAL
REJECTED TEST: NORMAL
SALICYLATE, SERUM: <0.3 MG/DL (ref 0–30)
SARS-COV-2 RNA, RT PCR: NOT DETECTED
SODIUM BLD-SCNC: 138 MMOL/L (ref 132–146)
SPECIFIC GRAVITY UA: 1.02 (ref 1–1.03)
TOTAL PROTEIN: 7.2 G/DL (ref 6.4–8.3)
TRICYCLIC ANTIDEPRESSANTS SCREEN SERUM: NEGATIVE NG/ML
UROBILINOGEN, URINE: 0.2 E.U./DL
WBC # BLD: 5 E9/L (ref 4.5–11.5)

## 2021-10-27 PROCEDURE — 93005 ELECTROCARDIOGRAM TRACING: CPT | Performed by: EMERGENCY MEDICINE

## 2021-10-27 PROCEDURE — 85025 COMPLETE CBC W/AUTO DIFF WBC: CPT

## 2021-10-27 PROCEDURE — 99285 EMERGENCY DEPT VISIT HI MDM: CPT

## 2021-10-27 PROCEDURE — 82077 ASSAY SPEC XCP UR&BREATH IA: CPT

## 2021-10-27 PROCEDURE — 80307 DRUG TEST PRSMV CHEM ANLYZR: CPT

## 2021-10-27 PROCEDURE — 80053 COMPREHEN METABOLIC PANEL: CPT

## 2021-10-27 PROCEDURE — 87636 SARSCOV2 & INF A&B AMP PRB: CPT

## 2021-10-27 PROCEDURE — 80143 DRUG ASSAY ACETAMINOPHEN: CPT

## 2021-10-27 PROCEDURE — 80179 DRUG ASSAY SALICYLATE: CPT

## 2021-10-27 PROCEDURE — 81003 URINALYSIS AUTO W/O SCOPE: CPT

## 2021-10-27 RX ORDER — RISPERIDONE 0.25 MG/1
0.25 TABLET, FILM COATED ORAL DAILY
COMMUNITY
End: 2022-03-23 | Stop reason: SDUPTHER

## 2021-10-27 RX ORDER — CITALOPRAM 40 MG/1
40 TABLET ORAL DAILY
COMMUNITY
End: 2022-03-23 | Stop reason: SDUPTHER

## 2021-10-27 RX ORDER — HYDROCODONE BITARTRATE AND ACETAMINOPHEN 7.5; 325 MG/1; MG/1
1 TABLET ORAL EVERY 6 HOURS PRN
Status: ON HOLD | COMMUNITY
End: 2021-11-30

## 2021-10-27 RX ORDER — ASPIRIN 81 MG/1
81 TABLET ORAL DAILY
COMMUNITY
End: 2022-09-12 | Stop reason: SDUPTHER

## 2021-10-27 NOTE — ED PROVIDER NOTES
Department of Emergency Medicine   ED  Provider Note  Admit Date/RoomTime: 10/27/2021  3:24 PM  ED Room: [address] [place] [address]          History of Present Illness:  10/27/21, Time: 3:26 PM EDT  Chief Complaint   Patient presents with    Hallucinations     Auditory hallucinations and noncompliant with psych meds. Leslie Francois is a 66 y.o. male presenting to the ED for Tory hallucinations and noncompliance with medication for schizophrenia, beginning several weeks ago. The complaint has been persistent, mild in severity, and worsened by nothing. Patient has history of schizophrenia, takes medication but has been noncompliant according to his wife whom he lives with. Over the past several days to weeks he has been having worsening auditory hallucinations. Patient states that he is hearing voices but is having no command hallucinations. He denies any suicidal or homicidal ideation. Patient does not give a reason for why he has been noncompliant with his medication, but agrees he has not been taking it as prescribed. Review of Systems:   Pertinent positives and negatives are stated within HPI, all other systems reviewed and are negative.        --------------------------------------------- PAST HISTORY ---------------------------------------------  Past Medical History:  has a past medical history of Allergic rhinitis, Arthritis, Baker's cyst of knee, CAD (coronary artery disease), Dental caries, Depression, DM (diabetes mellitus) ([address] GERD (gastroesophageal reflux disease), Heart murmur, Hyperlipidemia, Hypertension, Intellectual delay, PONV (postoperative nausea and vomiting), Preoperative clearance, Prostate enlargement, Schizo affective schizophrenia ([address] Seasonal allergies, Sleep apnea, Speech disorder, and Vitamin D deficiency. Past Surgical History:  has a past surgical history that includes Coronary angioplasty with stent (early 2000's); eye surgery;  Appendectomy; cyst removal; knee surgery (2004); Cataract removal with implant (Bilateral, 2014); Coronary angioplasty with stent; Appendectomy; and Tooth Extraction (10/11/2017). Social History:  reports that he has never smoked. He has never used smokeless tobacco. He reports that he does not drink alcohol and does not use drugs. Family History: family history includes Cancer in his brother; Diabetes in his mother; Heart Disease in his father; Liver Disease in his brother. . Unless otherwise noted, family history is non contributory    The patients home medications have been reviewed. Allergies: Seasonal        ---------------------------------------------------PHYSICAL EXAM--------------------------------------    Constitutional/General: Alert and oriented x3  Head: Normocephalic and atraumatic  Eyes: PERRL, EOMI, sclera non icteric  Mouth: Oropharynx clear, handling secretions, no trismus, no asymmetry of the posterior oropharynx or uvular edema  Neck: Supple, full ROM, no stridor, no meningeal signs  Respiratory: Lungs clear to auscultation bilaterally, no wheezes, rales, or rhonchi. Not in respiratory distress  Cardiovascular:  Regular rate. Regular rhythm. No murmurs, no aortic murmurs, no gallops, or rubs. 2+ distal pulses. Equal extremity pulses. Chest: No chest wall tenderness  GI:  Abdomen Soft, Non tender, Non distended. No rebound, guarding, or rigidity. No pulsatile masses. Musculoskeletal: Moves all extremities x 4. Warm and well perfused, no clubbing, cyanosis, or edema. Capillary refill <3 seconds  Integument: skin warm and dry. No rashes. Neurologic: GCS 15, no focal deficits, symmetric strength 5/5 in the upper and lower extremities bilaterally  Psychiatric: Normal Affect, calm and cooperative          -------------------------------------------------- RESULTS -------------------------------------------------  I have personally reviewed all laboratory and imaging results for this patient.  Results are listed below.     LABS: (Lab results interpreted by me)  Results for orders placed or performed during the hospital encounter of 10/27/21   COVID-19 & Influenza Combo    Specimen: Nasopharyngeal Swab   Result Value Ref Range    SARS-CoV-2 RNA, RT PCR NOT DETECTED NOT DETECTED    INFLUENZA A NOT DETECTED NOT DETECTED    INFLUENZA B NOT DETECTED NOT DETECTED   Comprehensive Metabolic Panel   Result Value Ref Range    Sodium 138 132 - 146 mmol/L    Potassium 3.9 3.5 - 5.0 mmol/L    Chloride 99 98 - 107 mmol/L    CO2 24 22 - 29 mmol/L    Anion Gap 15 7 - 16 mmol/L    Glucose 100 (H) 74 - 99 mg/dL    BUN 15 6 - 23 mg/dL    CREATININE 0.9 0.7 - 1.2 mg/dL    GFR Non-African American >60 >=60 mL/min/1.73    GFR African American >60     Calcium 10.3 (H) 8.6 - 10.2 mg/dL    Total Protein 7.2 6.4 - 8.3 g/dL    Albumin 4.8 3.5 - 5.2 g/dL    Total Bilirubin 0.4 0.0 - 1.2 mg/dL    Alkaline Phosphatase 39 (L) 40 - 129 U/L    ALT 20 0 - 40 U/L    AST 23 0 - 39 U/L   Serum Drug Screen   Result Value Ref Range    Ethanol Lvl <10 mg/dL    Acetaminophen Level <5.0 (L) 10.0 - 97.1 mcg/mL    Salicylate, Serum <3.9 0.0 - 30.0 mg/dL    TCA Scrn NEGATIVE Cutoff:300 ng/mL   Urine Drug Screen   Result Value Ref Range    Amphetamine Screen, Urine NOT DETECTED Negative <1000 ng/mL    Barbiturate Screen, Ur NOT DETECTED Negative < 200 ng/mL    Benzodiazepine Screen, Urine NOT DETECTED Negative < 200 ng/mL    Cannabinoid Scrn, Ur NOT DETECTED Negative < 50ng/mL    Cocaine Metabolite Screen, Urine NOT DETECTED Negative < 300 ng/mL    Opiate Scrn, Ur NOT DETECTED Negative < 300ng/mL    PCP Screen, Urine NOT DETECTED Negative < 25 ng/mL    Methadone Screen, Urine NOT DETECTED Negative <300 ng/mL    Oxycodone Urine NOT DETECTED Negative <100 ng/mL    FENTANYL SCREEN, URINE NOT DETECTED Negative <1 ng/mL    Drug Screen Comment: see below    Urinalysis   Result Value Ref Range    Color, UA Yellow Straw/Yellow    Clarity, UA Clear Clear    Glucose, Ur Negative Negative mg/dL    Bilirubin Urine Negative Negative    Ketones, Urine Negative Negative mg/dL    Specific Gravity, UA 1.025 1.005 - 1.030    Blood, Urine Negative Negative    pH, UA 5.5 5.0 - 9.0    Protein, UA Negative Negative mg/dL    Urobilinogen, Urine 0.2 <2.0 E.U./dL    Nitrite, Urine Negative Negative    Leukocyte Esterase, Urine Negative Negative   SPECIMEN REJECTION   Result Value Ref Range    Rejected Test CBCWD     Reason for Rejection see below    ,       RADIOLOGY:  Interpreted by Radiologist unless otherwise specified  No orders to display         EKG Interpretation  Interpreted by emergency department physician, Dr. Gary Carvalho    Date of EKG: 10/27/21  Time: 1612    Rhythm: normal sinus   Rate: 66  Axis: normal  Conduction: 1st degree AV block  ST Segments: no acute change  T Waves: no acute change    Clinical Impression: No findings suggestive of acute ischemia or injury  Comparison to prior EKG: stable as compared to patient's most recent EKG      ------------------------- NURSING NOTES AND VITALS REVIEWED ---------------------------   The nursing notes within the ED encounter and vital signs as below have been reviewed by myself  /64   Pulse 78   Temp 98 °F (36.7 °C)   Resp 19   Wt 194 lb (88 kg)   SpO2 96%   BMI 26.31 kg/m²     Oxygen Saturation Interpretation: Normal    The patients available past medical records and past encounters were reviewed. ------------------------------ ED COURSE/MEDICAL DECISION MAKING----------------------  Medications - No data to display              Medical Decision Making:     I, Dr. Forrest Thacker, am the primary provider of record    67-year-old male with history of schizophrenia presenting with auditory hallucinations and medication noncompliance. He is having no suicidal or homicidal ideation. He is very calm and cooperative, neurologically intact. Metabolic panel is within acceptable limits, no leukocytosis or anemia.   Urine and serum drug

## 2021-10-27 NOTE — ED NOTES
TOLU RODRIGUEZ reached out to [facility] at [phone] and spoke Paola Chaparro LPN to and completed a referral for to find inpatient admission with first option being [facility].       Jacinta Sommers, SELWYN, [place]  10/27/21 3760
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
## 2021-10-28 VITALS
BODY MASS INDEX: 26.31 KG/M2 | DIASTOLIC BLOOD PRESSURE: 65 MMHG | SYSTOLIC BLOOD PRESSURE: 111 MMHG | RESPIRATION RATE: 18 BRPM | TEMPERATURE: 98.8 F | WEIGHT: 194 LBS | HEART RATE: 66 BPM | OXYGEN SATURATION: 97 %

## 2021-10-28 LAB
EKG ATRIAL RATE: 66 BPM
EKG P AXIS: 38 DEGREES
EKG P-R INTERVAL: 212 MS
EKG Q-T INTERVAL: 418 MS
EKG QRS DURATION: 90 MS
EKG QTC CALCULATION (BAZETT): 438 MS
EKG R AXIS: 28 DEGREES
EKG T AXIS: 36 DEGREES
EKG VENTRICULAR RATE: 66 BPM

## 2021-10-28 PROCEDURE — 93010 ELECTROCARDIOGRAM REPORT: CPT | Performed by: INTERNAL MEDICINE

## 2021-10-28 PROCEDURE — 6370000000 HC RX 637 (ALT 250 FOR IP): Performed by: EMERGENCY MEDICINE

## 2021-10-28 RX ORDER — HYDROCODONE BITARTRATE AND ACETAMINOPHEN 5; 325 MG/1; MG/1
1 TABLET ORAL ONCE
Status: COMPLETED | OUTPATIENT
Start: 2021-10-28 | End: 2021-10-28

## 2021-10-28 RX ADMIN — HYDROCODONE BITARTRATE AND ACETAMINOPHEN 1 TABLET: 5; 325 TABLET ORAL at 02:03

## 2021-10-28 ASSESSMENT — PAIN SCALES - GENERAL: PAINLEVEL_OUTOF10: 5

## 2021-10-28 NOTE — ED NOTES
Informed by previous SW that if [facility] can not guarantee a bed for this patient in the morning to have the [address] go ahead and continue looking for a bed.      Jose Berry, MSW, LSW  10/28/21 0142

## 2021-10-28 NOTE — ED NOTES
Nita Carey 8141 from the [address] [place] [address] called to request that EKG interpretation be faxed to [facility].     [facility] faxed interpretation, actual EKG and [address] Covid screening (to be proactive)     Hilda Macias MSW, LSW  10/28/21 0252

## 2021-10-28 NOTE — ED NOTES
Pt alert oriented skin warm dry resp easy pt states has been hearing voices off and on but not telling him anything.  Pt denies suicidal ideations is cooperative, calm has good eye contact with flat affect     Evelyne Murphy RN  10/28/21 8815

## 2021-10-28 NOTE — ED NOTES
Caity from the [facility] requested pink slip be faxed to [address] [place], MSW, [facility]  10/28/21 3969

## 2021-10-28 NOTE — ED NOTES
Luly Saeed from the [address] [place] [address] called to inform that [facility] do not have any beds at this time but are expecting beds in the morning. RN believes the Doctor will accept Pt but is 100% for sure unable to confirm when discharges will happen. Luly Saeed will continue to look for bed placement and keep Pt on wait list at [facility].      Bobby Chapman, SELWYN, [place]  10/28/21 9076

## 2021-10-28 NOTE — ED NOTES
Pt's transfer pack is complete and Pt is ready to transfer when EMS arrives     Neda Medrano, SELWYN, [facility]  10/28/21 1427

## 2021-11-22 LAB — SARS-COV-2: DETECTED

## 2021-11-28 ENCOUNTER — HOSPITAL ENCOUNTER (INPATIENT)
Age: 71
LOS: 10 days | Discharge: NURSING, REHAB OR LONG-TERM CARE | DRG: 177 | End: 2021-12-09
Attending: STUDENT IN AN ORGANIZED HEALTH CARE EDUCATION/TRAINING PROGRAM | Admitting: INTERNAL MEDICINE
Payer: MEDICARE

## 2021-11-28 ENCOUNTER — APPOINTMENT (OUTPATIENT)
Dept: GENERAL RADIOLOGY | Age: 71
DRG: 177 | End: 2021-11-28
Payer: MEDICARE

## 2021-11-28 DIAGNOSIS — U07.1 ACUTE RESPIRATORY FAILURE DUE TO COVID-19 (HCC): Primary | ICD-10-CM

## 2021-11-28 DIAGNOSIS — J96.00 ACUTE RESPIRATORY FAILURE DUE TO COVID-19 (HCC): Primary | ICD-10-CM

## 2021-11-28 DIAGNOSIS — I26.94 MULTIPLE SUBSEGMENTAL PULMONARY EMBOLI WITHOUT ACUTE COR PULMONALE (HCC): ICD-10-CM

## 2021-11-28 LAB
ALBUMIN SERPL-MCNC: 3.4 G/DL (ref 3.5–5.2)
ALP BLD-CCNC: 41 U/L (ref 40–129)
ALT SERPL-CCNC: 47 U/L (ref 0–40)
ANION GAP SERPL CALCULATED.3IONS-SCNC: 16 MMOL/L (ref 7–16)
AST SERPL-CCNC: 59 U/L (ref 0–39)
BASOPHILS ABSOLUTE: 0.01 E9/L (ref 0–0.2)
BASOPHILS RELATIVE PERCENT: 0.1 % (ref 0–2)
BILIRUB SERPL-MCNC: 0.7 MG/DL (ref 0–1.2)
BUN BLDV-MCNC: 34 MG/DL (ref 6–23)
CALCIUM SERPL-MCNC: 9.2 MG/DL (ref 8.6–10.2)
CHLORIDE BLD-SCNC: 102 MMOL/L (ref 98–107)
CO2: 18 MMOL/L (ref 22–29)
CREAT SERPL-MCNC: 1.1 MG/DL (ref 0.7–1.2)
D DIMER: 3495 NG/ML DDU
EOSINOPHILS ABSOLUTE: 0 E9/L (ref 0.05–0.5)
EOSINOPHILS RELATIVE PERCENT: 0 % (ref 0–6)
GFR AFRICAN AMERICAN: >60
GFR NON-AFRICAN AMERICAN: >60 ML/MIN/1.73
GLUCOSE BLD-MCNC: 210 MG/DL (ref 74–99)
HCT VFR BLD CALC: 31.7 % (ref 37–54)
HEMOGLOBIN: 11 G/DL (ref 12.5–16.5)
IMMATURE GRANULOCYTES #: 0.06 E9/L
IMMATURE GRANULOCYTES %: 0.7 % (ref 0–5)
LYMPHOCYTES ABSOLUTE: 0.26 E9/L (ref 1.5–4)
LYMPHOCYTES RELATIVE PERCENT: 3 % (ref 20–42)
MAGNESIUM: 2.1 MG/DL (ref 1.6–2.6)
MCH RBC QN AUTO: 29.6 PG (ref 26–35)
MCHC RBC AUTO-ENTMCNC: 34.7 % (ref 32–34.5)
MCV RBC AUTO: 85.4 FL (ref 80–99.9)
MONOCYTES ABSOLUTE: 0.16 E9/L (ref 0.1–0.95)
MONOCYTES RELATIVE PERCENT: 1.8 % (ref 2–12)
NEUTROPHILS ABSOLUTE: 8.25 E9/L (ref 1.8–7.3)
NEUTROPHILS RELATIVE PERCENT: 94.4 % (ref 43–80)
OVALOCYTES: ABNORMAL
PDW BLD-RTO: 12.8 FL (ref 11.5–15)
PLATELET # BLD: 238 E9/L (ref 130–450)
PMV BLD AUTO: 11.7 FL (ref 7–12)
POIKILOCYTES: ABNORMAL
POLYCHROMASIA: ABNORMAL
POTASSIUM REFLEX MAGNESIUM: 3.3 MMOL/L (ref 3.5–5)
PRO-BNP: 728 PG/ML (ref 0–125)
RBC # BLD: 3.71 E12/L (ref 3.8–5.8)
SODIUM BLD-SCNC: 136 MMOL/L (ref 132–146)
TOTAL PROTEIN: 6.8 G/DL (ref 6.4–8.3)
TROPONIN, HIGH SENSITIVITY: 35 NG/L (ref 0–11)
WBC # BLD: 8.7 E9/L (ref 4.5–11.5)

## 2021-11-28 PROCEDURE — 83880 ASSAY OF NATRIURETIC PEPTIDE: CPT

## 2021-11-28 PROCEDURE — 85378 FIBRIN DEGRADE SEMIQUANT: CPT

## 2021-11-28 PROCEDURE — 83735 ASSAY OF MAGNESIUM: CPT

## 2021-11-28 PROCEDURE — 84484 ASSAY OF TROPONIN QUANT: CPT

## 2021-11-28 PROCEDURE — 93005 ELECTROCARDIOGRAM TRACING: CPT | Performed by: STUDENT IN AN ORGANIZED HEALTH CARE EDUCATION/TRAINING PROGRAM

## 2021-11-28 PROCEDURE — 99284 EMERGENCY DEPT VISIT MOD MDM: CPT

## 2021-11-28 PROCEDURE — 85025 COMPLETE CBC W/AUTO DIFF WBC: CPT

## 2021-11-28 PROCEDURE — 96360 HYDRATION IV INFUSION INIT: CPT

## 2021-11-28 PROCEDURE — 2580000003 HC RX 258: Performed by: STUDENT IN AN ORGANIZED HEALTH CARE EDUCATION/TRAINING PROGRAM

## 2021-11-28 PROCEDURE — 71045 X-RAY EXAM CHEST 1 VIEW: CPT

## 2021-11-28 PROCEDURE — 80053 COMPREHEN METABOLIC PANEL: CPT

## 2021-11-28 RX ORDER — MULTIVITAMIN WITH FOLIC ACID 400 MCG
1 TABLET ORAL DAILY
COMMUNITY
Start: 2021-11-10 | End: 2022-09-20 | Stop reason: SDUPTHER

## 2021-11-28 RX ORDER — OLANZAPINE 5 MG/1
5 TABLET ORAL DAILY
COMMUNITY
Start: 2021-11-11 | End: 2022-03-23 | Stop reason: SDUPTHER

## 2021-11-28 RX ORDER — 0.9 % SODIUM CHLORIDE 0.9 %
1000 INTRAVENOUS SOLUTION INTRAVENOUS ONCE
Status: COMPLETED | OUTPATIENT
Start: 2021-11-28 | End: 2021-11-29

## 2021-11-28 RX ADMIN — SODIUM CHLORIDE 1000 ML: 9 INJECTION, SOLUTION INTRAVENOUS at 23:43

## 2021-11-28 ASSESSMENT — ENCOUNTER SYMPTOMS
BACK PAIN: 0
ABDOMINAL PAIN: 0
RHINORRHEA: 0
BLOOD IN STOOL: 0
CONSTIPATION: 0
NAUSEA: 0
DIARRHEA: 1
COUGH: 1
SHORTNESS OF BREATH: 1
VOMITING: 0
SORE THROAT: 0

## 2021-11-29 ENCOUNTER — APPOINTMENT (OUTPATIENT)
Dept: CT IMAGING | Age: 71
DRG: 177 | End: 2021-11-29
Payer: MEDICARE

## 2021-11-29 PROBLEM — J96.90 RESPIRATORY FAILURE (HCC): Status: ACTIVE | Noted: 2021-11-29

## 2021-11-29 LAB
APTT: 30 SEC (ref 24.5–35.1)
BACTERIA: NORMAL /HPF
BILIRUBIN URINE: ABNORMAL
BLOOD, URINE: ABNORMAL
C-REACTIVE PROTEIN: 27.4 MG/DL (ref 0–0.4)
CLARITY: CLEAR
COLOR: YELLOW
EKG ATRIAL RATE: 70 BPM
EKG P AXIS: 49 DEGREES
EKG P-R INTERVAL: 186 MS
EKG Q-T INTERVAL: 442 MS
EKG QRS DURATION: 86 MS
EKG QTC CALCULATION (BAZETT): 477 MS
EKG R AXIS: 16 DEGREES
EKG T AXIS: 10 DEGREES
EKG VENTRICULAR RATE: 70 BPM
FERRITIN: 1781 NG/ML
FIBRINOGEN: >700 MG/DL (ref 225–540)
GLUCOSE URINE: NEGATIVE MG/DL
HCT VFR BLD CALC: 33.3 % (ref 37–54)
HEMOGLOBIN: 11.2 G/DL (ref 12.5–16.5)
INR BLD: 1.4
KETONES, URINE: NEGATIVE MG/DL
LACTATE DEHYDROGENASE: 476 U/L (ref 135–225)
LEUKOCYTE ESTERASE, URINE: NEGATIVE
MCH RBC QN AUTO: 29.6 PG (ref 26–35)
MCHC RBC AUTO-ENTMCNC: 33.6 % (ref 32–34.5)
MCV RBC AUTO: 88.1 FL (ref 80–99.9)
NITRITE, URINE: NEGATIVE
PDW BLD-RTO: 13 FL (ref 11.5–15)
PH UA: 6 (ref 5–9)
PLATELET # BLD: 232 E9/L (ref 130–450)
PMV BLD AUTO: 11.8 FL (ref 7–12)
PROCALCITONIN: 1 NG/ML (ref 0–0.08)
PROTEIN UA: 100 MG/DL
PROTHROMBIN TIME: 15.5 SEC (ref 9.3–12.4)
RBC # BLD: 3.78 E12/L (ref 3.8–5.8)
RBC UA: NORMAL /HPF (ref 0–2)
SPECIFIC GRAVITY UA: 1.02 (ref 1–1.03)
TROPONIN, HIGH SENSITIVITY: 28 NG/L (ref 0–11)
UROBILINOGEN, URINE: 2 E.U./DL
WBC # BLD: 8.1 E9/L (ref 4.5–11.5)
WBC UA: NORMAL /HPF (ref 0–5)

## 2021-11-29 PROCEDURE — 85730 THROMBOPLASTIN TIME PARTIAL: CPT

## 2021-11-29 PROCEDURE — 6360000002 HC RX W HCPCS: Performed by: INTERNAL MEDICINE

## 2021-11-29 PROCEDURE — 74177 CT ABD & PELVIS W/CONTRAST: CPT

## 2021-11-29 PROCEDURE — 81001 URINALYSIS AUTO W/SCOPE: CPT

## 2021-11-29 PROCEDURE — 6360000004 HC RX CONTRAST MEDICATION: Performed by: RADIOLOGY

## 2021-11-29 PROCEDURE — APPSS45 APP SPLIT SHARED TIME 31-45 MINUTES: Performed by: NURSE PRACTITIONER

## 2021-11-29 PROCEDURE — 1200000000 HC SEMI PRIVATE

## 2021-11-29 PROCEDURE — 93010 ELECTROCARDIOGRAM REPORT: CPT | Performed by: INTERNAL MEDICINE

## 2021-11-29 PROCEDURE — 71275 CT ANGIOGRAPHY CHEST: CPT

## 2021-11-29 PROCEDURE — 85384 FIBRINOGEN ACTIVITY: CPT

## 2021-11-29 PROCEDURE — 85027 COMPLETE CBC AUTOMATED: CPT

## 2021-11-29 PROCEDURE — XW0DXM6 INTRODUCTION OF BARICITINIB INTO MOUTH AND PHARYNX, EXTERNAL APPROACH, NEW TECHNOLOGY GROUP 6: ICD-10-PCS | Performed by: INTERNAL MEDICINE

## 2021-11-29 PROCEDURE — 82728 ASSAY OF FERRITIN: CPT

## 2021-11-29 PROCEDURE — 84145 PROCALCITONIN (PCT): CPT

## 2021-11-29 PROCEDURE — 86140 C-REACTIVE PROTEIN: CPT

## 2021-11-29 PROCEDURE — 99223 1ST HOSP IP/OBS HIGH 75: CPT | Performed by: INTERNAL MEDICINE

## 2021-11-29 PROCEDURE — 85610 PROTHROMBIN TIME: CPT

## 2021-11-29 PROCEDURE — 2580000003 HC RX 258: Performed by: INTERNAL MEDICINE

## 2021-11-29 PROCEDURE — 84484 ASSAY OF TROPONIN QUANT: CPT

## 2021-11-29 PROCEDURE — 6370000000 HC RX 637 (ALT 250 FOR IP): Performed by: INTERNAL MEDICINE

## 2021-11-29 PROCEDURE — 83615 LACTATE (LD) (LDH) ENZYME: CPT

## 2021-11-29 RX ORDER — DEXTROSE MONOHYDRATE 50 MG/ML
100 INJECTION, SOLUTION INTRAVENOUS PRN
Status: DISCONTINUED | OUTPATIENT
Start: 2021-11-29 | End: 2021-12-09 | Stop reason: HOSPADM

## 2021-11-29 RX ORDER — VITAMIN B COMPLEX
2000 TABLET ORAL DAILY
Status: DISCONTINUED | OUTPATIENT
Start: 2021-11-29 | End: 2021-12-02

## 2021-11-29 RX ORDER — ONDANSETRON 2 MG/ML
4 INJECTION INTRAMUSCULAR; INTRAVENOUS EVERY 6 HOURS PRN
Status: DISCONTINUED | OUTPATIENT
Start: 2021-11-29 | End: 2021-12-09 | Stop reason: HOSPADM

## 2021-11-29 RX ORDER — ONDANSETRON 4 MG/1
4 TABLET, ORALLY DISINTEGRATING ORAL EVERY 8 HOURS PRN
Status: DISCONTINUED | OUTPATIENT
Start: 2021-11-29 | End: 2021-12-09 | Stop reason: HOSPADM

## 2021-11-29 RX ORDER — SODIUM CHLORIDE 0.9 % (FLUSH) 0.9 %
5-40 SYRINGE (ML) INJECTION EVERY 12 HOURS SCHEDULED
Status: DISCONTINUED | OUTPATIENT
Start: 2021-11-29 | End: 2021-12-09 | Stop reason: HOSPADM

## 2021-11-29 RX ORDER — ACETAMINOPHEN 650 MG/1
650 SUPPOSITORY RECTAL EVERY 6 HOURS PRN
Status: DISCONTINUED | OUTPATIENT
Start: 2021-11-29 | End: 2021-12-09 | Stop reason: HOSPADM

## 2021-11-29 RX ORDER — RISPERIDONE 0.25 MG/1
0.25 TABLET, FILM COATED ORAL 2 TIMES DAILY
Status: DISCONTINUED | OUTPATIENT
Start: 2021-11-29 | End: 2021-12-09 | Stop reason: HOSPADM

## 2021-11-29 RX ORDER — DEXTROSE MONOHYDRATE 25 G/50ML
12.5 INJECTION, SOLUTION INTRAVENOUS PRN
Status: DISCONTINUED | OUTPATIENT
Start: 2021-11-29 | End: 2021-12-09 | Stop reason: HOSPADM

## 2021-11-29 RX ORDER — FERROUS SULFATE 325(65) MG
325 TABLET ORAL 2 TIMES DAILY
Status: DISCONTINUED | OUTPATIENT
Start: 2021-11-29 | End: 2021-12-09 | Stop reason: HOSPADM

## 2021-11-29 RX ORDER — OLANZAPINE 5 MG/1
5 TABLET ORAL DAILY
Status: DISCONTINUED | OUTPATIENT
Start: 2021-11-29 | End: 2021-12-09 | Stop reason: HOSPADM

## 2021-11-29 RX ORDER — ERGOCALCIFEROL 1.25 MG/1
50000 CAPSULE ORAL WEEKLY
Status: DISCONTINUED | OUTPATIENT
Start: 2021-11-29 | End: 2021-12-02 | Stop reason: SDUPTHER

## 2021-11-29 RX ORDER — NICOTINE POLACRILEX 4 MG
15 LOZENGE BUCCAL PRN
Status: DISCONTINUED | OUTPATIENT
Start: 2021-11-29 | End: 2021-12-09 | Stop reason: HOSPADM

## 2021-11-29 RX ORDER — CITALOPRAM 20 MG/1
40 TABLET ORAL DAILY
Status: DISCONTINUED | OUTPATIENT
Start: 2021-11-29 | End: 2021-12-09 | Stop reason: HOSPADM

## 2021-11-29 RX ORDER — SODIUM CHLORIDE 9 MG/ML
25 INJECTION, SOLUTION INTRAVENOUS PRN
Status: DISCONTINUED | OUTPATIENT
Start: 2021-11-29 | End: 2021-12-09 | Stop reason: HOSPADM

## 2021-11-29 RX ORDER — EZETIMIBE 10 MG/1
10 TABLET ORAL DAILY
Status: DISCONTINUED | OUTPATIENT
Start: 2021-11-29 | End: 2021-12-09 | Stop reason: HOSPADM

## 2021-11-29 RX ORDER — HYDROCODONE BITARTRATE AND ACETAMINOPHEN 7.5; 325 MG/1; MG/1
1 TABLET ORAL EVERY 6 HOURS PRN
Status: DISCONTINUED | OUTPATIENT
Start: 2021-11-29 | End: 2021-12-09 | Stop reason: HOSPADM

## 2021-11-29 RX ORDER — POLYETHYLENE GLYCOL 3350 17 G/17G
17 POWDER, FOR SOLUTION ORAL DAILY PRN
Status: DISCONTINUED | OUTPATIENT
Start: 2021-11-29 | End: 2021-12-09 | Stop reason: HOSPADM

## 2021-11-29 RX ORDER — ACETAMINOPHEN 325 MG/1
650 TABLET ORAL EVERY 6 HOURS PRN
Status: DISCONTINUED | OUTPATIENT
Start: 2021-11-29 | End: 2021-12-09 | Stop reason: HOSPADM

## 2021-11-29 RX ORDER — ASCORBIC ACID 500 MG
500 TABLET ORAL DAILY
Status: DISCONTINUED | OUTPATIENT
Start: 2021-11-29 | End: 2021-12-09 | Stop reason: HOSPADM

## 2021-11-29 RX ORDER — FINASTERIDE 5 MG/1
5 TABLET, FILM COATED ORAL DAILY
Status: DISCONTINUED | OUTPATIENT
Start: 2021-11-29 | End: 2021-12-09 | Stop reason: HOSPADM

## 2021-11-29 RX ORDER — DEXAMETHASONE 6 MG/1
6 TABLET ORAL DAILY
Status: DISCONTINUED | OUTPATIENT
Start: 2021-11-29 | End: 2021-12-01

## 2021-11-29 RX ORDER — NITROGLYCERIN 0.4 MG/1
0.4 TABLET SUBLINGUAL EVERY 5 MIN PRN
Status: DISCONTINUED | OUTPATIENT
Start: 2021-11-29 | End: 2021-12-09 | Stop reason: HOSPADM

## 2021-11-29 RX ORDER — SODIUM CHLORIDE 0.9 % (FLUSH) 0.9 %
5-40 SYRINGE (ML) INJECTION PRN
Status: DISCONTINUED | OUTPATIENT
Start: 2021-11-29 | End: 2021-12-09 | Stop reason: HOSPADM

## 2021-11-29 RX ORDER — ZINC SULFATE 50(220)MG
50 CAPSULE ORAL DAILY
Status: DISCONTINUED | OUTPATIENT
Start: 2021-11-29 | End: 2021-12-09 | Stop reason: HOSPADM

## 2021-11-29 RX ORDER — METOPROLOL SUCCINATE 25 MG/1
25 TABLET, EXTENDED RELEASE ORAL DAILY
Status: DISCONTINUED | OUTPATIENT
Start: 2021-11-29 | End: 2021-12-09 | Stop reason: HOSPADM

## 2021-11-29 RX ORDER — TAMSULOSIN HYDROCHLORIDE 0.4 MG/1
0.4 CAPSULE ORAL 2 TIMES DAILY
Status: DISCONTINUED | OUTPATIENT
Start: 2021-11-29 | End: 2021-12-09 | Stop reason: HOSPADM

## 2021-11-29 RX ORDER — PANTOPRAZOLE SODIUM 40 MG/1
40 TABLET, DELAYED RELEASE ORAL
Status: DISCONTINUED | OUTPATIENT
Start: 2021-11-29 | End: 2021-12-09 | Stop reason: HOSPADM

## 2021-11-29 RX ORDER — FENOFIBRATE 160 MG/1
160 TABLET ORAL DAILY
Status: DISCONTINUED | OUTPATIENT
Start: 2021-11-29 | End: 2021-12-09 | Stop reason: HOSPADM

## 2021-11-29 RX ADMIN — Medication 10 ML: at 09:12

## 2021-11-29 RX ADMIN — ENOXAPARIN SODIUM 80 MG: 100 INJECTION SUBCUTANEOUS at 07:42

## 2021-11-29 RX ADMIN — METOPROLOL SUCCINATE 25 MG: 25 TABLET, FILM COATED, EXTENDED RELEASE ORAL at 11:27

## 2021-11-29 RX ADMIN — Medication 2000 UNITS: at 11:29

## 2021-11-29 RX ADMIN — Medication 500 MG: at 11:24

## 2021-11-29 RX ADMIN — ZINC SULFATE 220 MG (50 MG) CAPSULE 50 MG: CAPSULE at 11:29

## 2021-11-29 RX ADMIN — ACETAMINOPHEN 650 MG: 325 TABLET ORAL at 14:35

## 2021-11-29 RX ADMIN — CITALOPRAM HYDROBROMIDE 40 MG: 20 TABLET ORAL at 11:25

## 2021-11-29 RX ADMIN — FINASTERIDE 5 MG: 5 TABLET, FILM COATED ORAL at 11:28

## 2021-11-29 RX ADMIN — FENOFIBRATE 160 MG: 160 TABLET ORAL at 11:26

## 2021-11-29 RX ADMIN — PANTOPRAZOLE SODIUM 40 MG: 40 TABLET, DELAYED RELEASE ORAL at 11:27

## 2021-11-29 RX ADMIN — TAMSULOSIN HYDROCHLORIDE 0.4 MG: 0.4 CAPSULE ORAL at 11:29

## 2021-11-29 RX ADMIN — FERROUS SULFATE TAB 325 MG (65 MG ELEMENTAL FE) 325 MG: 325 (65 FE) TAB at 11:27

## 2021-11-29 RX ADMIN — BARICITINIB 4 MG: 2 TABLET, FILM COATED ORAL at 11:24

## 2021-11-29 RX ADMIN — EZETIMIBE 10 MG: 10 TABLET ORAL at 11:26

## 2021-11-29 RX ADMIN — DEXAMETHASONE 6 MG: 6 TABLET ORAL at 11:26

## 2021-11-29 RX ADMIN — OLANZAPINE 5 MG: 5 TABLET, FILM COATED ORAL at 11:28

## 2021-11-29 RX ADMIN — RISPERIDONE 0.25 MG: 0.25 TABLET, FILM COATED ORAL at 11:28

## 2021-11-29 RX ADMIN — IOPAMIDOL 75 ML: 755 INJECTION, SOLUTION INTRAVENOUS at 00:51

## 2021-11-29 ASSESSMENT — PAIN SCALES - GENERAL: PAINLEVEL_OUTOF10: 0

## 2021-11-29 NOTE — ED PROVIDER NOTES
Sheryl Lynn is a 66 y.o. male with a PMHx significant for CAD, HLD, psychiatric history who presents for evaluation of shortness of breath, beginning prior to arrival.  The complaint has been persistent, moderate in severity, and worsened by nothing. Per nursing home the patient tested positive on 11/22 at facility and he states that he has been having worsening symptoms. The patient states that he has been feeling more short of breath on exertion and feels like he is going to pass out. The patient feels fatigued and states that he has not had much of an appetite. On chart review the patient does have some psychiatric history according to the paperwork that was brought with him. Currently denies any homicidal or suicidal ideation. The history is provided by the patient and medical records. Review of Systems   Constitutional: Positive for appetite change and fatigue. Negative for chills and fever. HENT: Negative for postnasal drip, rhinorrhea and sore throat. Eyes: Negative for visual disturbance. Respiratory: Positive for cough and shortness of breath. Cardiovascular: Negative for chest pain. Gastrointestinal: Positive for diarrhea. Negative for abdominal pain, blood in stool, constipation, nausea and vomiting. Genitourinary: Negative for difficulty urinating, dysuria and urgency. Musculoskeletal: Negative for back pain. Skin: Negative for rash. Neurological: Positive for light-headedness. Negative for dizziness, numbness and headaches. Physical Exam  Vitals and nursing note reviewed. Constitutional:       General: He is not in acute distress. Appearance: He is well-developed. He is not ill-appearing. HENT:      Head: Normocephalic and atraumatic. Right Ear: External ear normal.      Left Ear: External ear normal.   Eyes:      General:         Right eye: No discharge. Left eye: No discharge. Extraocular Movements: Extraocular movements intact. Conjunctiva/sclera: Conjunctivae normal.   Cardiovascular:      Rate and Rhythm: Normal rate and regular rhythm. Heart sounds: Normal heart sounds. No murmur heard. Pulmonary:      Effort: Pulmonary effort is normal. No respiratory distress. Breath sounds: Normal breath sounds. No stridor. Abdominal:      General: There is no distension. Palpations: Abdomen is soft. There is no mass. Tenderness: There is no abdominal tenderness. There is no rebound. Musculoskeletal:         General: No swelling or tenderness. Cervical back: Normal range of motion and neck supple. Skin:     General: Skin is warm and dry. Coloration: Skin is not jaundiced or pale. Neurological:      General: No focal deficit present. Mental Status: He is alert and oriented to person, place, and time. Cranial Nerves: No cranial nerve deficit. Coordination: Coordination normal.          Procedures     [facility]     ED Course as of 11/30/21 1229   Mon Nov 29, 2021   9207 Case discussed with Dr. Tanika Pierre. She will admit the patient. Patient informed of this. [BB]   0746 EKG: This EKG is signed and interpreted by me. Rate: 70  Rhythm: Sinus  Interpretation: non-specific EKG, no ST ovation, ST depressions, , QRS 86, QTc 477  Comparison: No signal change when compared to prior   [BB]      ED Course User Index  [BB] DO Bonnie Camacho Comes presents to the ED for evaluation of shortness of breath. Workup in the ED revealed patient that is COVID positive with elevated dimer; A CTA chest was ordered demonstrating evidence of pulmonary emboli. The patient incidentally also had finding for hematoma to the right abdomen. On physical exam there is no ecchymosis. Case discussed with hospitalist who recommends the patient be started on lovenox due to concern for PE and elevated Dimer.  Patient requires continued workup and management of their symptoms and will be admitted to the hospital for further evaluation and treatment.      --------------------------------------------- PAST HISTORY ---------------------------------------------  Past Medical History:  has a past medical history of Allergic rhinitis, Arthritis, Baker's cyst of knee, CAD (coronary artery disease), Dental caries, Depression, DM (diabetes mellitus) ([facility] GERD (gastroesophageal reflux disease), Heart murmur, Hyperlipidemia, Hypertension, Intellectual delay, PONV (postoperative nausea and vomiting), Preoperative clearance, Prostate enlargement, Schizo affective schizophrenia ([facility] Seasonal allergies, Sleep apnea, Speech disorder, and Vitamin D deficiency. Past Surgical History:  has a past surgical history that includes Coronary angioplasty with stent (early 2000's); eye surgery; Appendectomy; cyst removal; knee surgery (2004); Cataract removal with implant (Bilateral, 2014); Coronary angioplasty with stent; Appendectomy; and Tooth Extraction (10/11/2017). Social History:  reports that he has never smoked. He has never used smokeless tobacco. He reports that he does not drink alcohol and does not use drugs. Family History: family history includes Cancer in his brother; Diabetes in his mother; Heart Disease in his father; Liver Disease in his brother. The patients home medications have been reviewed.     Allergies: Seasonal    -------------------------------------------------- RESULTS -------------------------------------------------    LABS:  Results for orders placed or performed during the hospital encounter of 11/28/21   CBC Auto Differential   Result Value Ref Range    WBC 8.7 4.5 - 11.5 E9/L    RBC 3.71 (L) 3.80 - 5.80 E12/L    Hemoglobin 11.0 (L) 12.5 - 16.5 g/dL    Hematocrit 31.7 (L) 37.0 - 54.0 %    MCV 85.4 80.0 - 99.9 fL    MCH 29.6 26.0 - 35.0 pg    MCHC 34.7 (H) 32.0 - 34.5 %    RDW 12.8 11.5 - 15.0 fL    Platelets 233 635 - 044 E9/L    MPV 11.7 7.0 - 12.0 fL    Neutrophils % 94.4 (H) 43.0 - 80.0 % Immature Granulocytes % 0.7 0.0 - 5.0 %    Lymphocytes % 3.0 (L) 20.0 - 42.0 %    Monocytes % 1.8 (L) 2.0 - 12.0 %    Eosinophils % 0.0 0.0 - 6.0 %    Basophils % 0.1 0.0 - 2.0 %    Neutrophils Absolute 8.25 (H) 1.80 - 7.30 E9/L    Immature Granulocytes # 0.06 E9/L    Lymphocytes Absolute 0.26 (L) 1.50 - 4.00 E9/L    Monocytes Absolute 0.16 0.10 - 0.95 E9/L    Eosinophils Absolute 0.00 (L) 0.05 - 0.50 E9/L    Basophils Absolute 0.01 0.00 - 0.20 E9/L    Polychromasia 1+     Poikilocytes 1+     Ovalocytes 1+    Comprehensive Metabolic Panel w/ Reflex to MG   Result Value Ref Range    Sodium 136 132 - 146 mmol/L    Potassium reflex Magnesium 3.3 (L) 3.5 - 5.0 mmol/L    Chloride 102 98 - 107 mmol/L    CO2 18 (L) 22 - 29 mmol/L    Anion Gap 16 7 - 16 mmol/L    Glucose 210 (H) 74 - 99 mg/dL    BUN 34 (H) 6 - 23 mg/dL    CREATININE 1.1 0.7 - 1.2 mg/dL    GFR Non-African American >60 >=60 mL/min/1.73    GFR African American >60     Calcium 9.2 8.6 - 10.2 mg/dL    Total Protein 6.8 6.4 - 8.3 g/dL    Albumin 3.4 (L) 3.5 - 5.2 g/dL    Total Bilirubin 0.7 0.0 - 1.2 mg/dL    Alkaline Phosphatase 41 40 - 129 U/L    ALT 47 (H) 0 - 40 U/L    AST 59 (H) 0 - 39 U/L   Troponin   Result Value Ref Range    Troponin, High Sensitivity 35 (H) 0 - 11 ng/L   Brain Natriuretic Peptide   Result Value Ref Range    Pro- (H) 0 - 125 pg/mL   D-Dimer, Quantitative   Result Value Ref Range    D-Dimer, Quant 3495 ng/mL DDU   Magnesium   Result Value Ref Range    Magnesium 2.1 1.6 - 2.6 mg/dL   Troponin   Result Value Ref Range    Troponin, High Sensitivity 28 (H) 0 - 11 ng/L   EKG 12 Lead   Result Value Ref Range    Ventricular Rate 70 BPM    Atrial Rate 70 BPM    P-R Interval 186 ms    QRS Duration 86 ms    Q-T Interval 442 ms    QTc Calculation (Bazett) 477 ms    P Axis 49 degrees    R Axis 16 degrees    T Axis 10 degrees       RADIOLOGY:  CTA PULMONARY W CONTRAST   Final Result   CT chest:      Tiny bilateral lower lobe subsegmental pulmonary emboli. No large or central   embolism and no evidence of right heart strain. Bilateral parenchymal infiltrates concerning for multifocal pneumonia,   probably viral.  Localized airspace opacities in the base of the lower lobes   are also likely due to pneumonia but short-term follow-up is recommended to   exclude the unlikely possibility of neoplasia. Scattered mediastinal lymph nodes favored to be reactive. CT abdomen and pelvis:      Asymmetric thickening of the right rectus abdominus musculature in the   anterior pelvic wall concerning for a rectus sheath hematoma with possible   faint blush of active hemorrhage as detailed above. Other etiologies   including neoplasia thought unlikely. Correlation with clinical presentation   and continued short-term follow-up recommended. Small hiatal hernia. Mild diverticulosis. Other chronic appearing findings in the abdomen and pelvis. Findings were discussed with Dr. Danny Badillo at approximately 0230 hours [place]   time on 11/29/2021. CT ABDOMEN PELVIS W IV CONTRAST Additional Contrast? None   Final Result   CT chest:      Tiny bilateral lower lobe subsegmental pulmonary emboli. No large or central   embolism and no evidence of right heart strain. Bilateral parenchymal infiltrates concerning for multifocal pneumonia,   probably viral.  Localized airspace opacities in the base of the lower lobes   are also likely due to pneumonia but short-term follow-up is recommended to   exclude the unlikely possibility of neoplasia. Scattered mediastinal lymph nodes favored to be reactive. CT abdomen and pelvis:      Asymmetric thickening of the right rectus abdominus musculature in the   anterior pelvic wall concerning for a rectus sheath hematoma with possible   faint blush of active hemorrhage as detailed above. Other etiologies   including neoplasia thought unlikely.   Correlation with clinical presentation   and continued short-term follow-up recommended. Small hiatal hernia. Mild diverticulosis. Other chronic appearing findings in the abdomen and pelvis. Findings were discussed with Dr. León Hennessy at approximately 0230 hours [place]   time on 11/29/2021. XR CHEST PORTABLE   Final Result   Extensive airspace disease throughout the lungs bilaterally consistent with   multifocal pneumonia.             ------------------------- NURSING NOTES AND VITALS REVIEWED ---------------------------  Date / Time Roomed:  11/28/2021  9:30 PM  ED Bed Assignment:  13/13    The nursing notes within the ED encounter and vital signs as below have been reviewed. Patient Vitals for the past 24 hrs:   BP Temp Temp src Pulse Resp SpO2 Height Weight   11/28/21 2342 (!) 101/57 -- -- 68 16 95 % -- --   11/28/21 2134 111/61 99.4 °F (37.4 °C) Oral 79 24 93 % 5' 6\" (1.676 m) 181 lb 6.4 oz (82.3 kg)       Oxygen Saturation Interpretation: Abnormal    ------------------------------------------ PROGRESS NOTES ------------------------------------------  Counseling:  I have spoken with the patient and discussed todays results, in addition to providing specific details for the plan of care and counseling regarding the diagnosis and prognosis. Their questions are answered at this time and they are agreeable with the plan of admission.    --------------------------------- ADDITIONAL PROVIDER NOTES ---------------------------------  Consultations:  Time: [phone]. Spoke with Dr. Malena De La Garza. Discussed case. They will admit the patient. This patient's ED course included: a personal history and physicial examination, re-evaluation prior to disposition, multiple bedside re-evaluations, IV medications, cardiac monitoring, continuous pulse oximetry and complex medical decision making and emergency management    This patient has remained hemodynamically stable and been closely monitored during their ED course.     Please note that the withdrawal or failure to initiate urgent interventions for this patient would likely result in a life threatening deterioration or permanent disability. Accordingly this patient received 30 minutes of critical care time, excluding separately billable procedures. Diagnosis:  1. Acute respiratory failure due to COVID-19 ([facility]    2. Multiple subsegmental pulmonary emboli without acute cor pulmonale (HCC)        Disposition:  Patient's disposition: Admit to telemetry  Patient's condition is stable.              Eleanor Knapp DO  11/30/21 1237

## 2021-11-29 NOTE — ED NOTES
Bed: 13  Expected date:   Expected time:   Means of arrival:   Comments:  350 Dell Zaragoza RN  11/28/21 9980

## 2021-11-29 NOTE — PROGRESS NOTES
Brief internal medicine progress note:    25-year-old male with history of diabetes who presents to emergency room with COVID-19 pneumonia acute hypoxic respiratory failure and pulmonary embolus. Patient seen by pulmonary as well. Getting Lovenox 1 mg/kg twice a day. Started on dexamethasone and baricitinib. Watch sugars closely. CAT scan shows ill-defined rectus sheath area that could be hematoma, will follow hemoglobin and exam for worsening hematoma carefully has stopping anticoagulation would mean patient would require IVC filter.     Mimi Mora MD

## 2021-11-29 NOTE — CONSULTS
PULMONARY CONSULTATION    Reason for Consultation: acute hypoxic respiratory failure, covid-19 pneumonia, pulmonary embolism  Referring Physician: Orland Najjar, MD    Communication with the referring physician will be sent via the electronic medical record. HPI:    The patient is a 66year old male with a history of diabetes, intellectual delay, schizophrenia who resides at a behav[facility] facility who was brought to the ED for increased dyspnea on exertion after testing positive for covid-19 on 11/22/21. He reports coughing, diarrhea, fatigue, and poor appetite but beyond this history is limited from the patient. He was hypoxic initially with saturation 69% which has improved on 5L supplemental oxygen. Chest imaging reveals small subsegmental pulmonary emboli with significant ground glass opacities consistent with covid pneumonia. CT of the abdomen also suggested a possible rectus sheath hematoma along the anterior pelvic wall. He has been placed on dexamethasone 6mg, baricitinib 4mg daily and lovenox 1mg/kg BID.            Past Medical History:   Diagnosis Date    Allergic rhinitis     Arthritis     Baker's cyst of knee     right    CAD (coronary artery disease)     follows with Dr. Daryn Aguilar caries     Depression     DM (diabetes mellitus) ([address]     GERD (gastroesophageal reflux disease)     Heart murmur     Hyperlipidemia     Hypertension     Intellectual delay     able to write name, limited reading / signs for self    PONV (postoperative nausea and vomiting)     Preoperative clearance 08/01/2017    cardiac clearance from Dr. Markos Cintron in EPIC notes    Prostate enlargement     Schizo affective schizophrenia ([facility] [address]     stable    Seasonal allergies     Sleep apnea     no use cpap    Speech disorder     since birth   [facility] Vitamin D deficiency        Past Surgical History:   Procedure Laterality Date    APPENDECTOMY      APPENDECTOMY      CATARACT REMOVAL WITH IMPLANT Bilateral 2014    CORONARY ANGIOPLASTY WITH STENT PLACEMENT  early 2000's    CORONARY ANGIOPLASTY WITH STENT PLACEMENT      CYST REMOVAL      On top of his head    EYE SURGERY      Child- [place]    KNEE SURGERY  2004    Right Knee-orthoscopic surgery    TOOTH EXTRACTION  10/11/2017    full mouth extractions       Family History   Problem Relation Age of Onset    Diabetes Mother     Heart Disease Father     Cancer Brother     Liver Disease Brother        Social History     Socioeconomic History    Marital status:      Spouse name: Not on file    Number of children: Not on file    Years of education: Not on file    Highest education level: Not on file   Occupational History    Occupation: maintenance   Tobacco Use    Smoking status: Never Smoker    Smokeless tobacco: Never Used   Vaping Use    Vaping Use: Never used   Substance and Sexual Activity    Alcohol use: No    Drug use: No    Sexual activity: Not on file   Other Topics Concern    Not on file   Social History Narrative    ** Merged History Encounter **          Social Determinants of Health     Financial Resource Strain: Low Risk     Difficulty of Paying Living Expenses: Not hard at all   Food Insecurity: No Food Insecurity    Worried About [address] in the Last Year: Never true    [address] in the Last Year: Never true   Transportation Needs:     Lack of Transportation (Medical): Not on file    Lack of Transportation (Non-Medical):  Not on file   Physical Activity:     Days of Exercise per Week: Not on file    Minutes of Exercise per Session: Not on file   Stress:     Feeling of Stress : Not on file   Social Connections:     Frequency of Communication with Friends and Family: Not on file    Frequency of Social Gatherings with Friends and Family: Not on file    Attends [employer] Services: Not on file    Active Member of Clubs or Organizations: Not on file    Attends Club or Organization Meetings: Not on file    Marital Status: Not on file   Intimate Partner Violence:     Fear of Current or Ex-Partner: Not on file    Emotionally Abused: Not on file    Physically Abused: Not on file    Sexually Abused: Not on file   Housing Stability:     Unable to Pay for Housing in the Last Year: Not on file    Number of Places Lived in the Last Year: Not on file    Unstable Housing in the Last Year: Not on file       Current Facility-Administered Medications   Medication Dose Route Frequency Provider Last Rate Last Admin    citalopram (CELEXA) tablet 40 mg  40 mg Oral Daily Ingrid Watters, DO        ezetimibe (ZETIA) tablet 10 mg  10 mg Oral Daily Ingrid Watters, DO        fenofibrate (TRIGLIDE) tablet 160 mg  160 mg Oral Daily Ingrid Watters, DO        ferrous sulfate (IRON 325) tablet 325 mg  325 mg Oral BID Ingrid Watters DO        finasteride (PROSCAR) tablet 5 mg  5 mg Oral Daily Ingrid Watters, DO        HYDROcodone-acetaminophen (NORCO) 7.5-325 MG per tablet 1 tablet  1 tablet Oral Q6H PRN Ingrid Watters DO        metoprolol succinate (TOPROL XL) extended release tablet 25 mg  25 mg Oral Daily Ingrid Watters DO        nitroGLYCERIN (NITROSTAT) SL tablet 0.4 mg  0.4 mg SubLINGual Q5 Min PRN Ingrid Watters DO        OLANZapine (ZYPREXA) tablet 5 mg  5 mg Oral Daily Ingrid Watters, DO        pantoprazole (PROTONIX) tablet 40 mg  40 mg Oral QAM AC Ingrid Watters, DO        risperiDONE (RISPERDAL) tablet 0.25 mg  0.25 mg Oral BID Ingrid Watters, DO        tamsulosin (FLOMAX) capsule 0.4 mg  0.4 mg Oral BID Ingrid Watters, DO        vitamin D (ERGOCALCIFEROL) capsule 50,000 Units  50,000 Units Oral Weekly Ingrid Watters DO        sodium chloride flush 0.9 % injection 5-40 mL  5-40 mL IntraVENous 2 times per day Ingrid Watters, DO   10 mL at 11/29/21 0912    sodium chloride flush 0.9 % injection 5-40 mL  5-40 mL IntraVENous PRN Ingrid Watters DO        0.9 % sodium chloride infusion  25 mL IntraVENous PRN Ingrid VALDEZ [place], DO        enoxaparin (LOVENOX) injection 80 mg  1 mg/kg SubCUTAneous BID Ingrid Watters, DO   80 mg at 11/29/21 0742    ondansetron (ZOFRAN-ODT) disintegrating tablet 4 mg  4 mg Oral Q8H PRN Hilton Showers, DO        Or    ondansetron (ZOFRAN) injection 4 mg  4 mg IntraVENous Q6H PRN Ingrid Herreraon, DO        polyethylene glycol (GLYCOLAX) packet 17 g  17 g Oral Daily PRN Ingrid Watters, DO        acetaminophen (TYLENOL) tablet 650 mg  650 mg Oral Q6H PRN Ingrid Watters, DO        Or    acetaminophen (TYLENOL) suppository 650 mg  650 mg Rectal Q6H PRN Ingrid Watters, DO        dexamethasone (DECADRON) tablet 6 mg  6 mg Oral Daily Ingrid Watters, DO        ascorbic acid (VITAMIN C) tablet 500 mg  500 mg Oral Daily Ingrid Watters, DO        vitamin D (CHOLECALCIFEROL) tablet 2,000 Units  2,000 Units Oral Daily Ingrid Watters, DO        zinc sulfate (ZINCATE) capsule 50 mg  50 mg Oral Daily Ingrid Herreraon, DO        baricitinib (OLUMIANT) tablet 4 mg  4 mg Oral Daily Ingrid Watters, DO         Current Outpatient Medications   Medication Sig Dispense Refill    Multiple Vitamin (DAILY-GERMÁN) TABS Take 1 tablet by mouth daily      OLANZapine (ZYPREXA) 5 MG tablet Take 5 mg by mouth daily      vitamin D (ERGOCALCIFEROL) 1.25 MG (49365 UT) CAPS capsule Take 1 capsule by mouth once a week 12 capsule 0    aspirin 81 MG EC tablet Take 81 mg by mouth daily      citalopram (CELEXA) 40 MG tablet Take 40 mg by mouth daily      HYDROcodone-acetaminophen (NORCO) 7.5-325 MG per tablet Take 1 tablet by mouth every 6 hours as needed for Pain.       risperiDONE (RISPERDAL) 0.25 MG tablet Take 0.25 mg by mouth 2 times daily      omeprazole (PRILOSEC) 40 MG delayed release capsule Take 1 capsule by mouth daily 90 capsule 1    metoprolol succinate (TOPROL XL) 25 MG extended release tablet Take 1 tablet by mouth daily 90 tablet 1    fenofibrate (TRICOR) 145 MG tablet Take 1 tablet by mouth daily 90 tablet 1    tamsulosin (FLOMAX) 0.4 MG capsule Take 1 capsule by mouth 2 times daily 180 capsule 1    nitroGLYCERIN (NITROSTAT) 0.3 MG SL tablet up to max of 3 total doses.  If no relief after 1 dose, call 911. 30 tablet 0    ezetimibe (ZETIA) 10 MG tablet Take 1 tablet by mouth daily 90 tablet 0    finasteride (PROSCAR) 5 MG tablet Take 1 tablet by mouth daily 30 tablet 2    ferrous sulfate (IRON 325) 325 (65 Fe) MG tablet Take 1 tablet by mouth 2 times daily 180 tablet 1       Allergies   Allergen Reactions    Seasonal        Review of Systems:  Unable to obtain    Physical Exam:  /62   Pulse 78   Temp 99.1 °F (37.3 °C) (Oral)   Resp 16   Ht 5' 6\" (1.676 m)   Wt 181 lb 6.4 oz (82.3 kg)   SpO2 94%   BMI 29.28 kg/m²    General: Lying in bed comfortably, no distress, breathing is not labored  HEENT: PERRL, EOMI, MMM, no oral lesions  Neck: supple, no adenopathy  CV: RRR without murmur  Lungs: clear to auscultation bilaterally without wheezing, no crackles  Abd: soft, NT, ND, bowel sounds normal  Ext: warm, no edema, no clubbing  Skin: no rashes  Neuro: CN II-XII grossly intact, no focal deficits    Oximetry: 94% on 5L    Imaging personally reviewed:  CTA chest/CT abdomen  Impression   CT chest:       Tiny bilateral lower lobe subsegmental pulmonary emboli.  No large or central   embolism and no evidence of right heart strain.       Bilateral parenchymal infiltrates concerning for multifocal pneumonia,   probably viral.  Localized airspace opacities in the base of the lower lobes   are also likely due to pneumonia but short-term follow-up is recommended to   exclude the unlikely possibility of neoplasia.       Scattered mediastinal lymph nodes favored to be reactive.       CT abdomen and pelvis:       Asymmetric thickening of the right rectus abdominus musculature in the   anterior pelvic wall concerning for a rectus sheath hematoma with possible   faint blush of active hemorrhage as detailed above.  Other etiologies including neoplasia thought unlikely.  Correlation with clinical presentation   and continued short-term follow-up recommended.       Small hiatal hernia.       Mild diverticulosis.       Other chronic appearing findings in the abdomen and pelvis.       Findings were discussed with Dr. León Hennessy at approximately 0230 hours [place]   time on 11/29/2021. CRP 1.1    D dimer 3495    Labs:  Lab Results   Component Value Date    WBC 8.1 11/29/2021    HGB 11.2 11/29/2021    HCT 33.3 11/29/2021    MCV 88.1 11/29/2021    MCH 29.6 11/29/2021    MCHC 33.6 11/29/2021    RDW 13.0 11/29/2021     11/29/2021    MPV 11.8 11/29/2021     Lab Results   Component Value Date     11/28/2021    K 3.3 11/28/2021     11/28/2021    CO2 18 11/28/2021    BUN 34 11/28/2021    CREATININE 1.1 11/28/2021    LABALBU 3.4 11/28/2021    CALCIUM 9.2 11/28/2021    GFRAA >60 11/28/2021    LABGLOM >60 11/28/2021     Lab Results   Component Value Date    PROTIME 15.5 11/29/2021    INR 1.4 11/29/2021           Assessment:  1. Acute hypoxic respiratory failure  2. covid 19 pneumonia, moderate  3. Pulmonary embolism provoked by current illness  4. ?rectus sheath hematoma  5. Diabetes  6. Schizophrenia  7. Lymphopenia     Plan:  1. Supplemental oxygen for saturations 88% and above  2. Dexamethasone 6mg daily  3. Started on baricitinib 4mg daily - monitor absolute lymphocyte count   4. Trend inflammatory markers  5. lovenox 1mg/kg BID - need to monitor his rectus sheath hematoma very carefully. If any signs of bleeding he would need an IVC filter  6. Vitamin cocktail    Will follow     Thank you for allowing me to participate in the care of Tai Calderon.        Joelle Chavez MD  11/29/2021 11:18 AM

## 2021-11-29 NOTE — H&P
[facility] History and Physical      CHIEF COMPLAINT: Increased shortness of breath    History of Present Illness: This is a 49-year-old  male with a history of coronary artery disease, GERD, heart murmur, hyperlipidemia, hypertension, sleep apnea, intellectual delay, speech disorder, depression, and schizoaffective schizophrenia. Patient from behavioral [place]. There for recent suicidal ideation. Patient denies any thoughts of suicide at this time. Tested positive for Covid on 11/22/2021. Patient states that he was becoming more short of breath. Shortness of breath worse on exertion. Not alleviated by anything. Associated symptoms include: headache, sore throat, poor appetite, loss of taste and smell, fatigue, loss of sleep, dizziness, and intermittent diarrhea with abdominal pain. Patient denies palpitations, cough, and chest pain. Patient 87% on room air upon EMS arrival. Patient currently on 6 L of oxygen via nasal cannula. Potassium 3.3, CO2 18, BUN/creatinine 34/1.1 proBNP 728, troponin 35 and then 28, albumin 3.4, ALT 47, AST 59, H&H 11/31.7, and D-dimer 3495. CTA positive for bilateral lower lobe subsegmental pulmonary emboli and multifocal pneumonia consistent with Covid infection. CT of the abdomen and pelvis concerning for a rectus sheath hematoma with possible faint active hemorrhage, small hiatal hernia, and mild diverticulosis.     Informant(s) for H&P: Patient and chart review    REVIEW OF SYSTEMS:  A comprehensive review of systems was negative except for: what is in the HPI      PMH:  Past Medical History:   Diagnosis Date    Allergic rhinitis     Arthritis     Baker's cyst of knee     right    CAD (coronary artery disease)     follows with Dr. Margo Marti caries     Depression     DM (diabetes mellitus) [address] [address]     GERD (gastroesophageal reflux disease)     Heart murmur     Hyperlipidemia     Hypertension     Intellectual delay     able to write name, limited reading / signs for self    PONV (postoperative nausea and vomiting)     Preoperative clearance 08/01/2017    cardiac clearance from Dr. Gerardo Kemp in EPIC notes    Prostate enlargement     Schizo affective schizophrenia ([address]     stable    Seasonal allergies     Sleep apnea     no use cpap    Speech disorder     since birth   [facility] Vitamin D deficiency        Surgical History:  Past Surgical History:   Procedure Laterality Date    APPENDECTOMY      APPENDECTOMY      CATARACT REMOVAL WITH IMPLANT Bilateral 2014    CORONARY ANGIOPLASTY WITH STENT PLACEMENT  early 2000's    CORONARY ANGIOPLASTY WITH STENT PLACEMENT      CYST REMOVAL      On top of his head    EYE SURGERY      Child- [facility] SURGERY  2004    Right Knee-orthoscopic surgery    TOOTH EXTRACTION  10/11/2017    full mouth extractions       Medications Prior to Admission:    Prior to Admission medications    Medication Sig Start Date End Date Taking? Authorizing Provider   Multiple Vitamin (DAILY-GERMÁN) TABS Take 1 tablet by mouth daily 11/10/21  Yes Historical Provider, MD   OLANZapine (ZYPREXA) 5 MG tablet Take 5 mg by mouth daily 11/11/21  Yes Historical Provider, MD   vitamin D (ERGOCALCIFEROL) 1.25 MG (39455 UT) CAPS capsule Take 1 capsule by mouth once a week 11/8/21  Yes Cici Agosto DO   aspirin 81 MG EC tablet Take 81 mg by mouth daily   Yes Historical Provider, MD   citalopram (CELEXA) 40 MG tablet Take 40 mg by mouth daily   Yes Historical Provider, MD   HYDROcodone-acetaminophen (NORCO) 7.5-325 MG per tablet Take 1 tablet by mouth every 6 hours as needed for Pain.    Yes Historical Provider, MD   risperiDONE (RISPERDAL) 0.25 MG tablet Take 0.25 mg by mouth 2 times daily   Yes Historical Provider, MD   omeprazole (PRILOSEC) 40 MG delayed release capsule Take 1 capsule by mouth daily 3/8/21  Yes Cici Agosto DO   metoprolol succinate (TOPROL XL) 25 MG extended release tablet Take 1 tablet by mouth daily 3/8/21  Yes Cici Bucsohao, DO   fenofibrate (TRICOR) 145 MG tablet Take 1 tablet by mouth daily 6/16/20  Yes Cici Bucsohao, DO   tamsulosin (FLOMAX) 0.4 MG capsule Take 1 capsule by mouth 2 times daily 6/16/20  Yes Cici Buccino, DO   nitroGLYCERIN (NITROSTAT) 0.3 MG SL tablet up to max of 3 total doses. If no relief after 1 dose, call 911. 5/1/20  Yes Cici Bucsohao, DO   ezetimibe (ZETIA) 10 MG tablet Take 1 tablet by mouth daily 11/8/21   Cici Buccino, DO   finasteride (PROSCAR) 5 MG tablet Take 1 tablet by mouth daily 10/18/21   Cici Buccino, DO   ferrous sulfate (IRON 325) 325 (65 Fe) MG tablet Take 1 tablet by mouth 2 times daily 3/8/21   Cici Altagraciao, DO       Allergies:    Seasonal    Social History:    reports that he has never smoked. He has never used smokeless tobacco. He reports that he does not drink alcohol and does not use drugs. Family History:   family history includes Cancer in his brother; Diabetes in his mother; Heart Disease in his father; Liver Disease in his brother. PHYSICAL EXAM:  Vitals:  BP (!) 101/57   Pulse 68   Temp 99.4 °F (37.4 °C) (Oral)   Resp 16   Ht 5' 6\" (1.676 m)   Wt 181 lb 6.4 oz (82.3 kg)   SpO2 95%   BMI 29.28 kg/m²     General Appearance: alert and oriented to person, place and time and in no acute distress  Skin: warm and dry  Head: normocephalic and atraumatic  Eyes: pupils equal, round, and reactive to light, conjunctivae normal  Pulmonary/Chest: Lung sounds diminished posterior bilateral bases on auscultation. Dyspneic on exertion. 6 L O2 via nasal cannula on. Cardiovascular: normal rate, normal S1 and S2 and no carotid bruits  Abdomen: soft, non-tender, non-distended, normal bowel sounds, no masses or organomegaly  Extremities: no cyanosis, no clubbing and no edema  Neurologic: speech slowed and slightly slurred (history of speech disorder).         LABS:  Recent Labs     11/28/21  2155      K 3.3*      CO2 18*   BUN 34*   CREATININE 1.1   GLUCOSE 210*   CALCIUM 9.2       Recent Labs     11/28/21 2155   WBC 8.7   RBC 3.71*   HGB 11.0*   HCT 31.7*   MCV 85.4   MCH 29.6   MCHC 34.7*   RDW 12.8      MPV 11.7       No results for input(s): POCGLU in the last 72 hours. CBC:   Lab Results   Component Value Date    WBC 8.7 11/28/2021    RBC 3.71 11/28/2021    HGB 11.0 11/28/2021    HCT 31.7 11/28/2021    MCV 85.4 11/28/2021    MCH 29.6 11/28/2021    MCHC 34.7 11/28/2021    RDW 12.8 11/28/2021     11/28/2021    MPV 11.7 11/28/2021     CMP:    Lab Results   Component Value Date     11/28/2021    K 3.3 11/28/2021     11/28/2021    CO2 18 11/28/2021    BUN 34 11/28/2021    CREATININE 1.1 11/28/2021    GFRAA >60 11/28/2021    LABGLOM >60 11/28/2021    GLUCOSE 210 11/28/2021    GLUCOSE 92 10/09/2010    PROT 6.8 11/28/2021    LABALBU 3.4 11/28/2021    CALCIUM 9.2 11/28/2021    BILITOT 0.7 11/28/2021    ALKPHOS 41 11/28/2021    AST 59 11/28/2021    ALT 47 11/28/2021     Troponin:    Lab Results   Component Value Date    TROPONINI 0.01 10/09/2010       Radiology:   CTA PULMONARY W CONTRAST   Final Result   CT chest:      Tiny bilateral lower lobe subsegmental pulmonary emboli. No large or central   embolism and no evidence of right heart strain. Bilateral parenchymal infiltrates concerning for multifocal pneumonia,   probably viral.  Localized airspace opacities in the base of the lower lobes   are also likely due to pneumonia but short-term follow-up is recommended to   exclude the unlikely possibility of neoplasia. Scattered mediastinal lymph nodes favored to be reactive. CT abdomen and pelvis:      Asymmetric thickening of the right rectus abdominus musculature in the   anterior pelvic wall concerning for a rectus sheath hematoma with possible   faint blush of active hemorrhage as detailed above. Other etiologies   including neoplasia thought unlikely.   Correlation with clinical presentation   and continued headache, sore throat, poor appetite, loss of taste and smell, fatigue, loss of sleep, dizziness, and intermittent diarrhea with abdominal pain. Patient 87% on room air upon EMS arrival. Patient currently on 6 L of oxygen via nasal cannula. Potassium 3.3, CO2 18, BUN/creatinine 34/1.1 proBNP 728, troponin 35 and then 28, albumin 3.4, ALT 47, AST 59, H&H 11/31.7, and D-dimer 3495. CTA positive for bilateral lower lobe subsegmental pulmonary emboli and multifocal pneumonia consistent with Covid infection. CT of the abdomen and pelvis concerning for a rectus sheath hematoma with possible faint active hemorrhage, small hiatal hernia, and mild diverticulosis. 1.  Acute hypoxic respiratory failure due to Covid-Will wean oxygen as tolerated. Consult pharmacy for baricitinib. Check CRP and LD. Continue Decadron. Start vitamin C, vitamin D, and zinc.  2.  Subsegmental pulmonary emboli-start Lovenox 1 mg/kg twice daily  3. Rectus sheath hematoma-we will monitor for active bleeding. Recheck H&H in a.m.  4.  Hypokalemia-monitor potassium level. Recheck BMP in a.m.  5.  Hypertension-monitor blood pressure. Restart Toprol XL. 6.  Hyperlipidemia-restart home med. Code Status: Full code  DVT prophylaxis: Lovenox      NOTE: This report was transcribed using voice recognition software. Every effort was made to ensure accuracy; however, inadvertent computerized transcription errors may be present. Electronically signed by TYSON Mohan CNP on 11/29/2021 at 3:12 AM     Addendum: I have personally participated in the history, exam, medical decision making with Piotr Langley on the date of service and I agree with all of the pertinent clinical information unless otherwise noted. I have also reviewed and agree with the past medical, family, and social history unless otherwise noted.      Patient was admitted with shortness of breath    PHYSICAL EXAM:  Vitals:  BP (!) 101/57   Pulse 68   Temp 99.4 °F (37.4 °C) (Oral)   Resp 16   Ht 5' 6\" (1.676 m)   Wt 181 lb 6.4 oz (82.3 kg)   SpO2 95%   BMI 29.28 kg/m²   Gen: awake, alert, NAD  Lungs: clear to auscultation bilaterally no crackles no wheezing. Heart: RRR, no murmur   Abdomen: soft nontender nondistended positive bowel sounds. Extremities: full range of motion no peripheral edema. Impression:  Active Problems:    Respiratory failure ([address]  Resolved Problems:    * No resolved hospital problems. *      My findings/plan include:      66year old male unvaccinated presents positive for covid 19 pneumonia. Will treat with decadron. CRP/LD pending to determine if candidate for baricitinib. Continue to trend inflammatory markers. Currently on 6 L NC and also has B/L subsegmental PE. Will start him on lovenox for now with plans to transition to [address]. Would start [address] now however has possible hemorrhage at the rectus abdominas sheath. So then plan is to continue lovenox 1 mg/kg bid and monitor hemoglobin. If stable then can start eliquis. Pulmonology on board as well. NOTE: This report was transcribed using voice recognition software. Every effort was made to ensure accuracy; however, inadvertent computerized transcription errors may be present.     Electronically signed by Nestor Garza DO on 11/29/2021 at 6:14 AM

## 2021-11-30 LAB
METER GLUCOSE: 114 MG/DL (ref 74–99)
METER GLUCOSE: 125 MG/DL (ref 74–99)
METER GLUCOSE: 128 MG/DL (ref 74–99)
METER GLUCOSE: 174 MG/DL (ref 74–99)
METER GLUCOSE: 200 MG/DL (ref 74–99)

## 2021-11-30 PROCEDURE — 1200000000 HC SEMI PRIVATE

## 2021-11-30 PROCEDURE — 6370000000 HC RX 637 (ALT 250 FOR IP): Performed by: INTERNAL MEDICINE

## 2021-11-30 PROCEDURE — 82962 GLUCOSE BLOOD TEST: CPT

## 2021-11-30 PROCEDURE — 99232 SBSQ HOSP IP/OBS MODERATE 35: CPT | Performed by: INTERNAL MEDICINE

## 2021-11-30 PROCEDURE — 6360000002 HC RX W HCPCS: Performed by: INTERNAL MEDICINE

## 2021-11-30 PROCEDURE — 2580000003 HC RX 258: Performed by: INTERNAL MEDICINE

## 2021-11-30 RX ORDER — ACETAMINOPHEN 325 MG/1
325 TABLET ORAL EVERY 6 HOURS PRN
COMMUNITY

## 2021-11-30 RX ADMIN — FERROUS SULFATE TAB 325 MG (65 MG ELEMENTAL FE) 325 MG: 325 (65 FE) TAB at 17:21

## 2021-11-30 RX ADMIN — Medication 10 ML: at 22:08

## 2021-11-30 RX ADMIN — FINASTERIDE 5 MG: 5 TABLET, FILM COATED ORAL at 08:47

## 2021-11-30 RX ADMIN — BARICITINIB 4 MG: 2 TABLET, FILM COATED ORAL at 08:47

## 2021-11-30 RX ADMIN — TAMSULOSIN HYDROCHLORIDE 0.4 MG: 0.4 CAPSULE ORAL at 00:33

## 2021-11-30 RX ADMIN — ENOXAPARIN SODIUM 80 MG: 100 INJECTION SUBCUTANEOUS at 00:36

## 2021-11-30 RX ADMIN — CITALOPRAM HYDROBROMIDE 40 MG: 20 TABLET ORAL at 08:48

## 2021-11-30 RX ADMIN — TAMSULOSIN HYDROCHLORIDE 0.4 MG: 0.4 CAPSULE ORAL at 22:09

## 2021-11-30 RX ADMIN — TAMSULOSIN HYDROCHLORIDE 0.4 MG: 0.4 CAPSULE ORAL at 08:48

## 2021-11-30 RX ADMIN — ZINC SULFATE 220 MG (50 MG) CAPSULE 50 MG: CAPSULE at 08:49

## 2021-11-30 RX ADMIN — RISPERIDONE 0.25 MG: 0.25 TABLET, FILM COATED ORAL at 00:33

## 2021-11-30 RX ADMIN — Medication 500 MG: at 08:49

## 2021-11-30 RX ADMIN — INSULIN LISPRO 1 UNITS: 100 INJECTION, SOLUTION INTRAVENOUS; SUBCUTANEOUS at 07:06

## 2021-11-30 RX ADMIN — OLANZAPINE 5 MG: 5 TABLET, FILM COATED ORAL at 08:48

## 2021-11-30 RX ADMIN — ENOXAPARIN SODIUM 80 MG: 100 INJECTION SUBCUTANEOUS at 22:09

## 2021-11-30 RX ADMIN — RISPERIDONE 0.25 MG: 0.25 TABLET, FILM COATED ORAL at 22:09

## 2021-11-30 RX ADMIN — INSULIN LISPRO 1 UNITS: 100 INJECTION, SOLUTION INTRAVENOUS; SUBCUTANEOUS at 01:33

## 2021-11-30 RX ADMIN — FERROUS SULFATE TAB 325 MG (65 MG ELEMENTAL FE) 325 MG: 325 (65 FE) TAB at 08:48

## 2021-11-30 RX ADMIN — DEXAMETHASONE 6 MG: 6 TABLET ORAL at 08:47

## 2021-11-30 RX ADMIN — FENOFIBRATE 160 MG: 160 TABLET ORAL at 08:48

## 2021-11-30 RX ADMIN — PANTOPRAZOLE SODIUM 40 MG: 40 TABLET, DELAYED RELEASE ORAL at 05:53

## 2021-11-30 RX ADMIN — RISPERIDONE 0.25 MG: 0.25 TABLET, FILM COATED ORAL at 08:47

## 2021-11-30 RX ADMIN — Medication 10 ML: at 08:49

## 2021-11-30 RX ADMIN — ENOXAPARIN SODIUM 80 MG: 100 INJECTION SUBCUTANEOUS at 08:48

## 2021-11-30 RX ADMIN — EZETIMIBE 10 MG: 10 TABLET ORAL at 08:48

## 2021-11-30 RX ADMIN — Medication 2000 UNITS: at 08:48

## 2021-11-30 RX ADMIN — Medication 10 ML: at 00:41

## 2021-11-30 ASSESSMENT — PAIN SCALES - GENERAL: PAINLEVEL_OUTOF10: 0

## 2021-11-30 NOTE — CARE COORDINATION
Social Work / Discharge Planning:    COVID positive 11/22/21. Patient is from [facility] and is currently on 7 liters of oxygen. He is receiving Lovenox 1 mg/kg twice a day and was started on dexamethasone and baricitinib. Patient is diabetic and sugars are being monitored closely. Social work attempted several times to contact patient by phone and received busy tone. Will attempt to follow up at a later time. Electronically signed by ZUHAIR So on 11/30/21 at 10:02 AM EST    Addendum:    Per Charge Nurse Dang Sanches, patient is from home. Attempted to contact patient's wife Charito Grajeda (ph: Home [phone] & cell [phone]) and left voice message requesting return phone call.  Electronically signed by ZUHAIR So on 11/30/21 at 10:13 AM EST

## 2021-11-30 NOTE — PROGRESS NOTES
Darci Veliz M.D.,[facility]  Andrea De Jesus D.O., F.A.C.O.I., Nisha Ngo M.D. Jacinda Pickett M.D. Tia Ruiz D.O. Daily Pulmonary Progress Note    Patient:  Chas Yang 66 y.o. male MRN: [id number]     Date of Service: 11/30/2021      Synopsis     We are following patient for respiratory failure with hypoxia, shortness of breath, covid 19 pneumonia, PE    \"CC\" shortness of breath     Code status: FULL       Subjective      No acute events overnight. Oxygen at 7 liters NC. SPO2 90% at rest. Occasional cough. Dyspnea with exertion. Review of Systems:  Constitutional: Positive for appetite change and fatigue. Negative for chills and fever. HENT: Negative for postnasal drip, rhinorrhea and sore throat. Eyes: Negative for visual disturbance. Respiratory: Positive for cough and shortness of breath. Cardiovascular: Negative for chest pain. Gastrointestinal: Positive for diarrhea. Negative for abdominal pain, blood in stool, constipation, nausea and vomiting. Genitourinary: Negative for difficulty urinating, dysuria and urgency. Musculoskeletal: Negative for back pain. Skin: Negative for rash. Neurological: Positive for light-headedness. Negative for dizziness, numbness and headaches.      24-hour events:  None     Objective   Vitals: BP (!) 93/55   Pulse 69   Temp 98 °F (36.7 °C) (Oral)   Resp 18   Ht 5' 6\" (1.676 m)   Wt 181 lb 6.4 oz (82.3 kg)   SpO2 90%   BMI 29.28 kg/m²     I/O:  No intake or output data in the 24 hours ending 11/30/21 1032    Vent Information  SpO2: 90 %                CURRENT MEDS :  Scheduled Meds:   citalopram  40 mg Oral Daily    ezetimibe  10 mg Oral Daily    fenofibrate  160 mg Oral Daily    ferrous sulfate  325 mg Oral BID    finasteride  5 mg Oral Daily    metoprolol succinate  25 mg Oral Daily    OLANZapine  5 mg Oral Daily    pantoprazole  40 mg Oral QAM AC    risperiDONE  0.25 mg Oral BID    tamsulosin  0.4 mg Oral BID vitamin D  50,000 Units Oral Weekly    sodium chloride flush  5-40 mL IntraVENous 2 times per day    enoxaparin  1 mg/kg SubCUTAneous BID    dexamethasone  6 mg Oral Daily    ascorbic acid  500 mg Oral Daily    Vitamin D  2,000 Units Oral Daily    zinc sulfate  50 mg Oral Daily    baricitinib  4 mg Oral Daily    insulin lispro  0-6 Units SubCUTAneous TID WC    insulin lispro  0-3 Units SubCUTAneous Nightly       Physical Exam:  Due to the current efforts to prevent transmission of COVID-19 and also the need to preserve PPE for other caregivers, a face-to-face encounter with the patient was not performed. That being said, all relevant records and diagnostic tests were reviewed, including laboratory results and imaging. Please reference any relevant documentation elsewhere. Care will be coordinated with the primary service. Pertinent/ New Labs and Imaging Studies     Imaging Personally Reviewed:    CTA chest/CT abdomen  Impression   CT chest:       Tiny bilateral lower lobe subsegmental pulmonary emboli. No large or central   embolism and no evidence of right heart strain. Bilateral parenchymal infiltrates concerning for multifocal pneumonia,   probably viral.  Localized airspace opacities in the base of the lower lobes   are also likely due to pneumonia but short-term follow-up is recommended to   exclude the unlikely possibility of neoplasia. Scattered mediastinal lymph nodes favored to be reactive. CT abdomen and pelvis:       Asymmetric thickening of the right rectus abdominus musculature in the   anterior pelvic wall concerning for a rectus sheath hematoma with possible   faint blush of active hemorrhage as detailed above. Other etiologies   including neoplasia thought unlikely. Correlation with clinical presentation   and continued short-term follow-up recommended. Small hiatal hernia. Mild diverticulosis. Other chronic appearing findings in the abdomen and pelvis. Findings were discussed with Dr. Blanchard at approximately 0230 hours [place]   time on 2021. CRP 1.1     D dimer 3495          Labs:  Lab Results   Component Value Date    WBC 8.1 2021    HGB 11.2 2021    HCT 33.3 2021    MCV 88.1 2021    MCH 29.6 2021    MCHC 33.6 2021    RDW 13.0 2021     2021    MPV 11.8 2021     Lab Results   Component Value Date     2021    K 3.3 2021     2021    CO2 18 2021    BUN 34 2021    CREATININE 1.1 2021    LABALBU 3.4 2021    CALCIUM 9.2 2021    GFRAA >60 2021    LABGLOM >60 2021     Lab Results   Component Value Date    PROTIME 15.5 2021    INR 1.4 2021     Recent Labs     21  2155   PROBNP 728*     Recent Labs     21  0740   PROCAL 1.00*     This SmartLink has not been configured with any valid records. Micro:  No results for input(s): CULTRESP in the last 72 hours. No results for input(s): LABGRAM in the last 72 hours. No results for input(s): LEGUR in the last 72 hours. No results for input(s): STREPNEUMAGU in the last 72 hours. No results for input(s): LP1UAG in the last 72 hours.   Original date of  testin2021  Vaccination status: Not vaccinated  Current oxygen delivery system: 7 L high flow nasal cannula  Dexamethasone dosin mg p.o. daily    start date: 2021  Remdesivir Y/N:    None       start date:  Baricitinib Y/N:    Yes        start date 4 mg daily for 14 days started 2021  Tocilizumab Y/N:   None      date given:  Respiratory cultures Y/N:      date/results:  Strep pneumoniae and Legionella Urine Antigen test Y/N: results: None  Antibiotics Y/N with start and stop dates: None  CRP: 27.4  D Dimer: 3495  INR 1.4  Procalcitonin: 1.00  Ferritin 171  Sed rate: Pending  DVT prophylaxis: Lovenox 1 mg/kg twice daily  GI prophylaxis: Protonix   PT/OT: Ordered  Lung recruitment techniques: incentive spirometer       Assessment:    Acute respiratory failure with hypoxia  Moderate COVID-19 pneumonia  Pulmonary embolism provoked by COVID-19 infection  Question of rectus sheath hematoma  Diabetes mellitus type 2  Lymphopenia related to viral infection  Schizophrenia      Plan:   Oxygen therapy 7 L high flow cannula wean to keep saturation greater than 88%  Dexamethasone 6 mg daily, vitamin cocktail  Lovenox 1 mg/kg twice daily-need to monitor rectus sheath hematoma carefully. Any signs of bleeding or anemia will need IVC filter  Trend inflammatory markers  Incentive spirometer, lung recruitment maneuvers    This plan of care was reviewed in collaboration with Dr. Elier Nickerson   Electronically signed by TYSON Cruz CNP on 11/30/2021 at 10:32 AM    I personally saw, examined, and cared for the patient. Labs, medications, radiographs reviewed. I agree with history exam and plans detailed in NP note.     Electronically signed by Omaira Soria MD on 11/30/2021 at 3:33 PM

## 2021-11-30 NOTE — PLAN OF CARE
Problem: Isolation Precautions - Risk of Spread of Infection  Goal: Prevent transmission of infection  Outcome: Met This Shift     Problem: Airway Clearance - Ineffective  Goal: Achieve or maintain patent airway  Outcome: Ongoing

## 2021-12-01 ENCOUNTER — APPOINTMENT (OUTPATIENT)
Dept: GENERAL RADIOLOGY | Age: 71
DRG: 177 | End: 2021-12-01
Payer: MEDICARE

## 2021-12-01 LAB
C-REACTIVE PROTEIN: 12 MG/DL (ref 0–0.4)
HCT VFR BLD CALC: 28.3 % (ref 37–54)
HEMOGLOBIN: 9.6 G/DL (ref 12.5–16.5)
LACTATE DEHYDROGENASE: 408 U/L (ref 135–225)
MCH RBC QN AUTO: 29.5 PG (ref 26–35)
MCHC RBC AUTO-ENTMCNC: 33.9 % (ref 32–34.5)
MCV RBC AUTO: 87.1 FL (ref 80–99.9)
METER GLUCOSE: 181 MG/DL (ref 74–99)
METER GLUCOSE: 206 MG/DL (ref 74–99)
METER GLUCOSE: 227 MG/DL (ref 74–99)
PDW BLD-RTO: 12.8 FL (ref 11.5–15)
PLATELET # BLD: 227 E9/L (ref 130–450)
PMV BLD AUTO: 12 FL (ref 7–12)
PROCALCITONIN: 0.43 NG/ML (ref 0–0.08)
RBC # BLD: 3.25 E12/L (ref 3.8–5.8)
SEDIMENTATION RATE, ERYTHROCYTE: 80 MM/HR (ref 0–15)
WBC # BLD: 6.9 E9/L (ref 4.5–11.5)

## 2021-12-01 PROCEDURE — 85027 COMPLETE CBC AUTOMATED: CPT

## 2021-12-01 PROCEDURE — 85651 RBC SED RATE NONAUTOMATED: CPT

## 2021-12-01 PROCEDURE — 1200000000 HC SEMI PRIVATE

## 2021-12-01 PROCEDURE — 97165 OT EVAL LOW COMPLEX 30 MIN: CPT

## 2021-12-01 PROCEDURE — 83615 LACTATE (LD) (LDH) ENZYME: CPT

## 2021-12-01 PROCEDURE — 2580000003 HC RX 258: Performed by: INTERNAL MEDICINE

## 2021-12-01 PROCEDURE — 6360000002 HC RX W HCPCS: Performed by: NURSE PRACTITIONER

## 2021-12-01 PROCEDURE — 71045 X-RAY EXAM CHEST 1 VIEW: CPT

## 2021-12-01 PROCEDURE — 6370000000 HC RX 637 (ALT 250 FOR IP): Performed by: INTERNAL MEDICINE

## 2021-12-01 PROCEDURE — 6360000002 HC RX W HCPCS: Performed by: INTERNAL MEDICINE

## 2021-12-01 PROCEDURE — 36415 COLL VENOUS BLD VENIPUNCTURE: CPT

## 2021-12-01 PROCEDURE — 84145 PROCALCITONIN (PCT): CPT

## 2021-12-01 PROCEDURE — 82962 GLUCOSE BLOOD TEST: CPT

## 2021-12-01 PROCEDURE — 99232 SBSQ HOSP IP/OBS MODERATE 35: CPT | Performed by: INTERNAL MEDICINE

## 2021-12-01 PROCEDURE — 86140 C-REACTIVE PROTEIN: CPT

## 2021-12-01 PROCEDURE — 97161 PT EVAL LOW COMPLEX 20 MIN: CPT

## 2021-12-01 RX ORDER — DEXAMETHASONE SODIUM PHOSPHATE 10 MG/ML
10 INJECTION, SOLUTION INTRAMUSCULAR; INTRAVENOUS EVERY 24 HOURS
Status: DISCONTINUED | OUTPATIENT
Start: 2021-12-06 | End: 2021-12-09 | Stop reason: HOSPADM

## 2021-12-01 RX ORDER — DEXAMETHASONE SODIUM PHOSPHATE 10 MG/ML
10 INJECTION, SOLUTION INTRAMUSCULAR; INTRAVENOUS EVERY 12 HOURS
Status: COMPLETED | OUTPATIENT
Start: 2021-12-01 | End: 2021-12-05

## 2021-12-01 RX ADMIN — DEXAMETHASONE SODIUM PHOSPHATE 10 MG: 10 INJECTION INTRAMUSCULAR; INTRAVENOUS at 21:18

## 2021-12-01 RX ADMIN — Medication 2000 UNITS: at 08:26

## 2021-12-01 RX ADMIN — ZINC SULFATE 220 MG (50 MG) CAPSULE 50 MG: CAPSULE at 08:25

## 2021-12-01 RX ADMIN — FERROUS SULFATE TAB 325 MG (65 MG ELEMENTAL FE) 325 MG: 325 (65 FE) TAB at 08:25

## 2021-12-01 RX ADMIN — INSULIN LISPRO 1 UNITS: 100 INJECTION, SOLUTION INTRAVENOUS; SUBCUTANEOUS at 11:48

## 2021-12-01 RX ADMIN — Medication 500 MG: at 08:25

## 2021-12-01 RX ADMIN — RISPERIDONE 0.25 MG: 0.25 TABLET, FILM COATED ORAL at 21:19

## 2021-12-01 RX ADMIN — FERROUS SULFATE TAB 325 MG (65 MG ELEMENTAL FE) 325 MG: 325 (65 FE) TAB at 16:27

## 2021-12-01 RX ADMIN — RISPERIDONE 0.25 MG: 0.25 TABLET, FILM COATED ORAL at 08:25

## 2021-12-01 RX ADMIN — INSULIN LISPRO 1 UNITS: 100 INJECTION, SOLUTION INTRAVENOUS; SUBCUTANEOUS at 23:23

## 2021-12-01 RX ADMIN — METOPROLOL SUCCINATE 25 MG: 25 TABLET, FILM COATED, EXTENDED RELEASE ORAL at 08:25

## 2021-12-01 RX ADMIN — TAMSULOSIN HYDROCHLORIDE 0.4 MG: 0.4 CAPSULE ORAL at 21:19

## 2021-12-01 RX ADMIN — FINASTERIDE 5 MG: 5 TABLET, FILM COATED ORAL at 08:25

## 2021-12-01 RX ADMIN — INSULIN LISPRO 1 UNITS: 100 INJECTION, SOLUTION INTRAVENOUS; SUBCUTANEOUS at 07:31

## 2021-12-01 RX ADMIN — TAMSULOSIN HYDROCHLORIDE 0.4 MG: 0.4 CAPSULE ORAL at 08:26

## 2021-12-01 RX ADMIN — ENOXAPARIN SODIUM 80 MG: 100 INJECTION SUBCUTANEOUS at 08:25

## 2021-12-01 RX ADMIN — FENOFIBRATE 160 MG: 160 TABLET ORAL at 08:25

## 2021-12-01 RX ADMIN — BARICITINIB 4 MG: 2 TABLET, FILM COATED ORAL at 08:26

## 2021-12-01 RX ADMIN — EZETIMIBE 10 MG: 10 TABLET ORAL at 08:26

## 2021-12-01 RX ADMIN — PANTOPRAZOLE SODIUM 40 MG: 40 TABLET, DELAYED RELEASE ORAL at 05:58

## 2021-12-01 RX ADMIN — Medication 10 ML: at 21:19

## 2021-12-01 RX ADMIN — Medication 10 ML: at 08:26

## 2021-12-01 RX ADMIN — INSULIN LISPRO 2 UNITS: 100 INJECTION, SOLUTION INTRAVENOUS; SUBCUTANEOUS at 17:07

## 2021-12-01 RX ADMIN — DEXAMETHASONE 6 MG: 6 TABLET ORAL at 08:26

## 2021-12-01 RX ADMIN — OLANZAPINE 5 MG: 5 TABLET, FILM COATED ORAL at 08:26

## 2021-12-01 RX ADMIN — CITALOPRAM HYDROBROMIDE 40 MG: 20 TABLET ORAL at 08:26

## 2021-12-01 RX ADMIN — DEXAMETHASONE SODIUM PHOSPHATE 10 MG: 10 INJECTION INTRAMUSCULAR; INTRAVENOUS at 11:37

## 2021-12-01 RX ADMIN — ENOXAPARIN SODIUM 80 MG: 100 INJECTION SUBCUTANEOUS at 21:19

## 2021-12-01 ASSESSMENT — PAIN SCALES - GENERAL
PAINLEVEL_OUTOF10: 0
PAINLEVEL_OUTOF10: 0

## 2021-12-01 NOTE — PROGRESS NOTES
assist  Supine to sit: min assist  Sit to supine: mod assist  Scooting: min assist  SBA   Transfers Sit to stand: min assist  Stand to sit: min assist  Stand pivot: NT  SBA   Ambulation    20 feet with ww with min to mod assist. Increased difficulty with turns  50 feet with ww with SBA   Stair Negotiation  Ascended and descended  NT      LE strength     3+/5    4-/5   balance      Fair with ww     AM-PAC Raw score               14/24         Pt is alert and Oriented to person and year  LE ROM: WFL  Sensation: intact  Edema: none  Endurance: fair-     ASSESSMENT:    Pt displays functional ability as noted in the objective portion of this evaluation. Patient education  Pt educated on safety with transfers    Patient response to education:   Pt verbalized understanding Pt demonstrated skill Pt requires further education in this area   no With assist yes       Comments:  Pt unsteady with ambulation. Increased difficulty on turns, assist to guide walker. Pt's/ family goals   1. Pt unable to state    Conditions Requiring Skilled Therapeutic Intervention:    [x]Decreased strength     []Decreased ROM  [x]Decreased functional mobility  [x]Decreased balance   [x]Decreased endurance   []Decreased posture  []Decreased sensation  []Decreased coordination   []Decreased vision  [x]Decreased safety awareness   []Increased pain       Patient and or family understand(s) diagnosis, prognosis, and plan of care.  No    Prognosis is good for reaching above PT goals    PHYSICAL THERAPY PLAN OF CARE:    PT POC is established based on physician order and patient diagnosis     Referring provider/PT Order: Magi Underwood MD/ PT eval and treat      Current Treatment Recommendations:     [x] Strengthening to improve independence with functional mobility   [] ROM to improve independence with functional mobility   [x] Balance Training to improve static/dynamic balance and to reduce fall risk  [x] Endurance Training to improve activity tolerance during functional mobility   [x] Transfer Training to improve safety and independence with all functional transfers   [x] Gait Training to improve gait mechanics, endurance and assess need for appropriate assistive device  [] Stair Training in preparation for safe discharge home and/or into the community   [] Positioning to prevent skin breakdown and contractures  [x] Safety and Education Training   [x] Patient/Caregiver Education   [] HEP  [] Other     PT long term treatment goals are located in above grid    Frequency of treatments: 2-5x/week x 5-7 days. Time in  145  Time out  202        Evaluation Time includes thorough review of current medical information, gathering information on past medical history/social history and prior level of function, completion of standardized testing/informal observation of tasks, assessment of data and education on plan of care and goals.       CPT codes:  [x] Low Complexity PT evaluation 73859  [] Moderate Complexity PT evaluation 27566  [] High Complexity PT evaluation 88907  [] PT Re-evaluation 56348  [] Gait training 91154 minutes  [] Manual therapy 92639 minutes  [] Therapeutic activities 47214 minutes  [] Therapeutic exercises 92539 minutes  [] Neuromuscular reeducation 48957 minutes     Marly PT [id number]

## 2021-12-01 NOTE — PROGRESS NOTES
Resp 18   Ht 5' 6\" (1.676 m)   Wt 181 lb 6.4 oz (82.3 kg)   SpO2 90%   BMI 29.28 kg/m²   General Appearance: alert and oriented to person, place and time, well developed and well- nourished, in no acute distress  Skin: warm and dry, no rash or erythema  Head: normocephalic and atraumatic  Neck: supple and non-tender  Pulmonary/Chest: Decreased sounds bilaterally, coarse breath sounds with scattered crackles especially bases  Cardiovascular: Regular, no murmur appreciated  Abdomen: soft, non-tender, non-distended, I cannot see any external bruising or feel any masses in his abdominal musculature  Extremities: no edema    Recent Labs     11/28/21 2155      K 3.3*      CO2 18*   BUN 34*   CREATININE 1.1   GLUCOSE 210*   CALCIUM 9.2       Recent Labs     11/28/21 2155 11/29/21  0740   WBC 8.7 8.1   RBC 3.71* 3.78*   HGB 11.0* 11.2*   HCT 31.7* 33.3*   MCV 85.4 88.1   MCH 29.6 29.6   MCHC 34.7* 33.6   RDW 12.8 13.0    232   MPV 11.7 11.8       Radiology:   CTA PULMONARY W CONTRAST   Final Result   CT chest:      Tiny bilateral lower lobe subsegmental pulmonary emboli. No large or central   embolism and no evidence of right heart strain. Bilateral parenchymal infiltrates concerning for multifocal pneumonia,   probably viral.  Localized airspace opacities in the base of the lower lobes   are also likely due to pneumonia but short-term follow-up is recommended to   exclude the unlikely possibility of neoplasia. Scattered mediastinal lymph nodes favored to be reactive. CT abdomen and pelvis:      Asymmetric thickening of the right rectus abdominus musculature in the   anterior pelvic wall concerning for a rectus sheath hematoma with possible   faint blush of active hemorrhage as detailed above. Other etiologies   including neoplasia thought unlikely. Correlation with clinical presentation   and continued short-term follow-up recommended. Small hiatal hernia.       Mild diverticulosis. Other chronic appearing findings in the abdomen and pelvis. Findings were discussed with Dr. Cindi Hung at approximately 0230 hours [place]   time on 11/29/2021. CT ABDOMEN PELVIS W IV CONTRAST Additional Contrast? None   Final Result   CT chest:      Tiny bilateral lower lobe subsegmental pulmonary emboli. No large or central   embolism and no evidence of right heart strain. Bilateral parenchymal infiltrates concerning for multifocal pneumonia,   probably viral.  Localized airspace opacities in the base of the lower lobes   are also likely due to pneumonia but short-term follow-up is recommended to   exclude the unlikely possibility of neoplasia. Scattered mediastinal lymph nodes favored to be reactive. CT abdomen and pelvis:      Asymmetric thickening of the right rectus abdominus musculature in the   anterior pelvic wall concerning for a rectus sheath hematoma with possible   faint blush of active hemorrhage as detailed above. Other etiologies   including neoplasia thought unlikely. Correlation with clinical presentation   and continued short-term follow-up recommended. Small hiatal hernia. Mild diverticulosis. Other chronic appearing findings in the abdomen and pelvis. Findings were discussed with Dr. Cindi Hung at approximately 0230 hours [place]   time on 11/29/2021. XR CHEST PORTABLE   Final Result   Extensive airspace disease throughout the lungs bilaterally consistent with   multifocal pneumonia. Assessment:    Active Problems:    Respiratory failure ([address]  Resolved Problems:    * No resolved hospital problems. *      Plan:    1. Covid 19 pneumonia with acute hypoxic respiratory failure  Appreciate pulmonary help. Patient still on same amount of oxygen but does feel better. Continues on baricitinib and Decadron 6 mg daily p.o. Follow inflammatory labs, CRP 27.4.     2.  Pulmonary embolus  Patient started on Lovenox 1 mg/kg. There is some concern on abdominal CT for rectus sheath hematoma but I cannot discern this on exam.    Follow patient's hemoglobin daily. 3.  Diabetes  Currently sugars all under 200. Currently only on low-dose sliding scale. We will increase as needed.     Electronically signed by Deanna Coronado MD on 11/30/2021 at 7:37 PM

## 2021-12-01 NOTE — PROGRESS NOTES
independence             Environmental modifications for safe mobility and completion of ADLs                             Therapeutic activity to improve functional performance during ADLs. Therapeutic exercise to improve tolerance and functional strength for ADLs    Balance retraining/tolerance tasks for facilitation of postural control with dynamic challenges during ADLs . Positioning to improve functional independence  []    Recommended Adaptive Equipment: TBD     SOCIAL: patient questionable historian Home Living: Pt states he lives with his girlfriend in a big house. Bed on 1st floor   States his girlfriend helps him with dressing.  Walks with a quad cane        Pain Level: no pain this session ;   Cognition: A&O: id'ed year not month, cued on place - able to recall    Memory:  Forgetful    Sequencing:  Fair-   Problem solving:  Fair-   Judgement/safety:  Fair-     Functional Assessment:  AM-PAC Daily Activity Raw Score: 14/24   Initial Eval Status  Date: 12/1/21 Treatment Status  Date: STGs = LTGs  Time frame: 10-14 days   Feeding SBA/set-up   Independent    Grooming Min A,seated   Supervision    UB Dressing Mod A  [facility] gown   SBA    LB Dressing Max A  Total assist donning socks   Min A   Bathing Mod A   Min A   Toileting Incontinent   Assist with hygiene and clean pad on bed   SBA    Bed Mobility  Mod A  Supine <> sit   SBA    Functional Transfers Min A  Sit-stand from bed   SBA    Functional Mobility Mod/Min A,w/walker, O2  Assist with balance and walker management     CGA/SBA  with good tolerance    Balance Sitting:     Static:  SBA- EOB     Dynamic:Mod A   Standing: Gallo   superivison    Activity Tolerance Fair with light activity   No SOB noted   On 11 L O2   O2 sats low 90s  Good  with ADL activity    Visual/  Perceptual Glasses: none by bedside                Hand Dominance right    AROM (PROM) Strength Additional Info:    RUE  WFL WFL good  and wfl FMC/dexterity noted during ADL tasks       [facility] WFL good  and wfl FMC/dexterity noted during ADL tasks       Hearing: WFL   Sensation:  No c/o numbness or tingling   Tone: WFL   Edema: none observed     Comments: Upon arrival patient lying in bed . At end of session, patient returned to bed  with call light and phone within reach, all lines and tubes intact. *ALARM ON      Overall patient demonstrated  decreased independence and safety during completion of ADL/functional transfer/mobility tasks. Pt would benefit from continued skilled OT to increase safety and independence with completion of ADL/IADL tasks for functional independence and quality of life. Rehab Potential: good for established goals     Patient / Family Goal: none stated       Patient and/or family were instructed on functional diagnosis, prognosis/goals and OT plan of care. Demonstrated fair -  understanding. Eval Complexity: Low    Time In: 1348  Time Out: 1405      Min Units   OT Eval Low 97165 x  1   OT Eval Medium 47173      OT Eval High 17469      OT Re-Eval [id number]       Therapeutic Ex 91043       Therapeutic Activities 09545       ADL/Self Care 13725       Orthotic Management 58206       Manual 70794     Neuro Re-Ed 16775       Non-Billable Time         Evaluation Time additionally includes thorough review of current medical information, gathering information on past medical history/social history and prior level of function, interpretation of standardized testing/informal observation of tasks, assessment of data and development of plan of care and goals.             Cristela Mcburney  OTR/L  OT [id number]

## 2021-12-01 NOTE — CARE COORDINATION
Social Work / Discharge Planning:    COVID positive 11/22/21. Chart reviewed. Patient admitted with respiratory failure and is on 12 liters high flow oxygen. Continues on baricitinib and Decadron 6 mg daily p.o. and lovenox 1 mg/kg twice daily. Social work attempted to reach patient by phone due to isolation several times and was hung up on each time. Attempted to contact patient's son Anderson Wilson (ph: [phone]) and left voice message requesting return call. Called [facility] (ph: [phone]) and left voice message requesting return call. PT/OT ordered. Social work will continue to follow. Electronically signed by SELWYN Noel LSW on 12/1/21 at 12:32 PM EST    Addendum:    Social work received return call from Kyaw Camacho [employer]. Kyaw Camacho reports he does not live with his parents but helps care for them. Kyaw Griggss lives 5 minutes from the home. Patient lives in 1 story home with 3 to 5 steps to enter. PTA patient ambulated independently and does not use AD. Patient did discharge from [facility] on the 11th and had phone conference with Sofi Espino (ph: [phone]) on the 15th. Patient is a diabetic but is not on insulin and has no testing supplies in the home. Nolan Oscar states his stepdad has the mentality of a 1513 year old. We discussed discharge plan and Kyaw Camacho would like patient to discharge home. Domingos mother is currently in the hospital. Kyaw Camacho reports patient does stay in the home by himself often and he is not concerned if patient would discharge home alone. PT/OT evals pending. Patient is currently on 12 liters high flow and does not have oxygen at home. If home oxygen is needed Kyaw Camacho does not have a DME preference. Social work will continue to follow.  Electronically signed by ZUHAIR Noel on 12/1/21 at 3:03 PM EST

## 2021-12-01 NOTE — ACP (ADVANCE CARE PLANNING)
Advance Care Planning     Advance Care Planning Activator (Inpatient)  Conversation Note      Date of ACP Conversation: 12/1/2021     Conversation Conducted with:  Healthcare Decision Maker: Next of Kin by law (only applies in absence of above) (name) Treasure Salgado    ACP Activator: SELWYN Steele, [facility] Decision Maker:     Current Designated Health Care Decision Maker:     Primary Decision Maker: Matilde Damon - Spouse - [phone]    Secondary Decision Maker: Keyur Albert - Child - [phone]    Today we documented Decision Maker(s) consistent with Legal Next of Kin hierarchy. Care Preferences    Ventilation: \"If you were in your present state of health and suddenly became very ill and were unable to breathe on your own, what would your preference be about the use of a ventilator (breathing machine) if it were available to you? \"      Would the patient desire the use of ventilator (breathing machine)?: yes    \"If your health worsens and it becomes clear that your chance of recovery is unlikely, what would your preference be about the use of a ventilator (breathing machine) if it were available to you? \"     Would the patient desire the use of ventilator (breathing machine)?: Yes      Resuscitation  \"CPR works best to restart the heart when there is a sudden event, like a heart attack, in someone who is otherwise healthy. Unfortunately, CPR does not typically restart the heart for people who have serious health conditions or who are very sick. \"    \"In the event your heart stopped as a result of an underlying serious health condition, would you want attempts to be made to restart your heart (answer \"yes\" for attempt to resuscitate) or would you prefer a natural death (answer \"no\" for do not attempt to resuscitate)? \" yes       [x] Yes   [] No   Educated Patient / Mohini Pineda regarding differences between Advance Directives and portable DNR orders.     Length of ACP Conversation in minutes: 5    Conversation Outcomes:  [x] ACP discussion completed  [] Existing advance directive reviewed with patient; no changes to patient's previously recorded wishes  [] New Advance Directive completed  [] Portable Do Not Rescitate prepared for Provider review and signature  [] POLST/POST/MOLST/MOST prepared for Provider review and signature      Follow-up plan:    [] Schedule follow-up conversation to continue planning  [x] Referred individual to Provider for additional questions/concerns   [] Advised patient/agent/surrogate to review completed ACP document and update if needed with changes in condition, patient preferences or care setting    [] This note routed to one or more involved healthcare providers

## 2021-12-01 NOTE — PROGRESS NOTES
Jian Dyson M.D.,[facility]  Shelly Powell D.O., F.A.C.O.I., Kev Srinivasan M.D. Adriana Jean-Baptiste M.D. Tami Francis D.O. 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 Personally seen and examined. Sitting up eating lunch oxygen at 11 liters NC. SPO2 90% at rest. Occasional cough. Dyspnea with exertion. Review of Systems:  Constitutional: Positive for appetite change and fatigue. Negative for chills and fever. HENT: Negative for postnasal drip, rhinorrhea and sore throat. Eyes: Negative for visual disturbance. Respiratory: Positive for cough and shortness of breath. Cardiovascular: Negative for chest pain. Gastrointestinal: Positive for diarrhea. Negative for abdominal pain, blood in stool, constipation, nausea and vomiting. Genitourinary: Negative for difficulty urinating, dysuria and urgency. Musculoskeletal: Negative for back pain. Skin: Negative for rash. Neurological: Positive for light-headedness. Negative for dizziness, numbness and headaches.      24-hour events:  None     Objective   Vitals: /62   Pulse 81   Temp 98.7 °F (37.1 °C) (Oral)   Resp 18   Ht 5' 6\" (1.676 m)   Wt 181 lb 6.4 oz (82.3 kg)   SpO2 91%   BMI 29.28 kg/m²     I/O:    Intake/Output Summary (Last 24 hours) at 12/1/2021 1536  Last data filed at 12/1/2021 1032  Gross per 24 hour   Intake 420 ml   Output 700 ml   Net -280 ml       Vent Information  SpO2: 91 %                CURRENT MEDS :  Scheduled Meds:   dexamethasone  10 mg IntraVENous Q12H    Followed by   Kota Gama ON 12/6/2021] dexamethasone  10 mg IntraVENous Q24H    citalopram  40 mg Oral Daily    ezetimibe  10 mg Oral Daily    fenofibrate  160 mg Oral Daily    ferrous sulfate  325 mg Oral BID    finasteride  5 mg Oral Daily    metoprolol succinate  25 mg Oral Daily    OLANZapine  5 mg Oral Daily    pantoprazole  40 mg Oral QAM AC    risperiDONE  0.25 mg Oral BID    tamsulosin  0.4 mg Oral BID    vitamin D  50,000 Units Oral Weekly    sodium chloride flush  5-40 mL IntraVENous 2 times per day    enoxaparin  1 mg/kg SubCUTAneous BID    ascorbic acid  500 mg Oral Daily    Vitamin D  2,000 Units Oral Daily    zinc sulfate  50 mg Oral Daily    baricitinib  4 mg Oral Daily    insulin lispro  0-6 Units SubCUTAneous TID WC    insulin lispro  0-3 Units SubCUTAneous Nightly       Physical Exam:   eneral: Lying in bed comfortably, no distress, breathing is not labored  HEENT: PERRL, EOMI, MMM, no oral lesions  Neck: supple, no adenopathy  CV: RRR without murmur  Lungs: clear to auscultation bilaterally without wheezing, no crackles  Abd: soft, NT, ND, bowel sounds normal  Ext: warm, no edema, no clubbing  Skin: no rashes  Neuro: CN II-XII grossly intact, no focal deficits    Pertinent/ New Labs and Imaging Studies     Imaging Personally Reviewed:  Chest x-ray 12/1/2021  FINDINGS:   History of COVID per EMR.       Grossly stable bilateral pulmonary consolidation.       No evidence of pneumothorax or pneumomediastinum.       The heart is not significantly enlarged.       No significant pleural effusion.       Bones are stable.           Impression   Stable bilateral pulmonary consolidation, compatible with the history of   COVID-19 pneumonia.             CTA chest/CT abdomen  Impression   CT chest:       Tiny bilateral lower lobe subsegmental pulmonary emboli. No large or central   embolism and no evidence of right heart strain.        Bilateral parenchymal infiltrates concerning for multifocal pneumonia,   probably viral.  Localized airspace opacities in the base of the lower lobes   are also likely due to pneumonia but short-term follow-up is recommended to   exclude the unlikely possibility of neoplasia. Scattered mediastinal lymph nodes favored to be reactive. CT abdomen and pelvis:       Asymmetric thickening of the right rectus abdominus musculature in the   anterior pelvic wall concerning for a rectus sheath hematoma with possible   faint blush of active hemorrhage as detailed above. Other etiologies   including neoplasia thought unlikely. Correlation with clinical presentation   and continued short-term follow-up recommended. Small hiatal hernia. Mild diverticulosis. Other chronic appearing findings in the abdomen and pelvis. Findings were discussed with Dr. Devora Fierro at approximately 0230 hours [facility]   time on 2021. CRP 1.1     D dimer 3495          Labs:  Lab Results   Component Value Date    WBC 6.9 2021    HGB 9.6 2021    HCT 28.3 2021    MCV 87.1 2021    MCH 29.5 2021    MCHC 33.9 2021    RDW 12.8 2021     2021    MPV 12.0 2021     Lab Results   Component Value Date     2021    K 3.3 2021     2021    CO2 18 2021    BUN 34 2021    CREATININE 1.1 2021    LABALBU 3.4 2021    CALCIUM 9.2 2021    GFRAA >60 2021    LABGLOM >60 2021     Lab Results   Component Value Date    PROTIME 15.5 2021    INR 1.4 2021     Recent Labs     21  2155   PROBNP 728*     Recent Labs     21  0335   PROCAL 0.43*     This SmartLink has not been configured with any valid records. Micro:  No results for input(s): CULTRESP in the last 72 hours. No results for input(s): LABGRAM in the last 72 hours. No results for input(s): LEGUR in the last 72 hours. No results for input(s): STREPNEUMAGU in the last 72 hours. No results for input(s): LP1UAG in the last 72 hours.     Original date of  testin2021  Vaccination status: Not vaccinated  Current oxygen delivery system: 11 L high flow nasal cannula  Dexamethasone dosin mg p.o. daily    start date: 2021 increased to 10 mg IV twice daily 2021  Remdesivir Y/N:    None       start date:  Baricitinib Y/N:    Yes        start date 4 mg daily for 14 days started 2021  Tocilizumab Y/N:   None      date given:  Respiratory cultures Y/N:      date/results:  Strep pneumoniae and Legionella Urine Antigen test Y/N: results: None  Antibiotics Y/N with start and stop dates: None  CRP: 27.4> 12.0  D Dimer: 3495  INR 1.4  Procalcitonin: 1.00> 0.43  Ferritin 171  Sed rate: Pending  DVT prophylaxis: Lovenox 1 mg/kg twice daily  GI prophylaxis: Protonix   PT/OT: Ordered  Lung recruitment techniques: incentive spirometer       Assessment:    1. Acute respiratory failure with hypoxia  2. Moderate COVID-19 pneumonia  3. Pulmonary embolism provoked by COVID-19 infection  4. Question of rectus sheath hematoma  5. Diabetes mellitus type 2  6. Lymphopenia related to viral infection  7. Schizophrenia      Plan:   1. Oxygen therapy 11 L high flow cannula wean to keep saturation greater than 88% add humidity to oxygen  2. Dexamethasone increase to 10 mg IV BID today, vitamin cocktail  3. Lovenox 1 mg/kg twice daily-need to monitor rectus sheath hematoma carefully. Any signs of bleeding or anemia will need IVC filter. Hgb today 9.6 down from 11.2.  4. Add incentive spirometer  5. Trend inflammatory markers  6. Incentive spirometer, lung recruitment maneuvers  7. Dvt, gi prophylaxis    This plan of care was reviewed in collaboration with Dr. Jordi Conner   Electronically signed by Donnie Dakins, APRN - CNP on 2021 at 3:36 PM    I personally saw, examined, and cared for the patient. Labs, medications, radiographs reviewed.  I agree with history exam and plans detailed in NP note with the following additions:    Oxygen requirements increased to 12L HF today  Increase dex to 10mg BID  Continue baricitinib  CRP improving    Electronically signed by Chico Medina MD on 12/1/2021 at 3:49 PM

## 2021-12-02 LAB
C-REACTIVE PROTEIN: 21.4 MG/DL (ref 0–0.4)
HCT VFR BLD CALC: 31.3 % (ref 37–54)
HEMOGLOBIN: 10.5 G/DL (ref 12.5–16.5)
LACTATE DEHYDROGENASE: 422 U/L (ref 135–225)
MCH RBC QN AUTO: 29.7 PG (ref 26–35)
MCHC RBC AUTO-ENTMCNC: 33.5 % (ref 32–34.5)
MCV RBC AUTO: 88.7 FL (ref 80–99.9)
METER GLUCOSE: 201 MG/DL (ref 74–99)
METER GLUCOSE: 202 MG/DL (ref 74–99)
METER GLUCOSE: 203 MG/DL (ref 74–99)
METER GLUCOSE: 225 MG/DL (ref 74–99)
PDW BLD-RTO: 13 FL (ref 11.5–15)
PLATELET # BLD: 231 E9/L (ref 130–450)
PMV BLD AUTO: 12.3 FL (ref 7–12)
RBC # BLD: 3.53 E12/L (ref 3.8–5.8)
WBC # BLD: 3.5 E9/L (ref 4.5–11.5)

## 2021-12-02 PROCEDURE — 85027 COMPLETE CBC AUTOMATED: CPT

## 2021-12-02 PROCEDURE — 83615 LACTATE (LD) (LDH) ENZYME: CPT

## 2021-12-02 PROCEDURE — 6370000000 HC RX 637 (ALT 250 FOR IP): Performed by: NURSE PRACTITIONER

## 2021-12-02 PROCEDURE — 6370000000 HC RX 637 (ALT 250 FOR IP): Performed by: INTERNAL MEDICINE

## 2021-12-02 PROCEDURE — 86140 C-REACTIVE PROTEIN: CPT

## 2021-12-02 PROCEDURE — 6360000002 HC RX W HCPCS: Performed by: INTERNAL MEDICINE

## 2021-12-02 PROCEDURE — 6360000002 HC RX W HCPCS: Performed by: NURSE PRACTITIONER

## 2021-12-02 PROCEDURE — 99233 SBSQ HOSP IP/OBS HIGH 50: CPT | Performed by: INTERNAL MEDICINE

## 2021-12-02 PROCEDURE — 82962 GLUCOSE BLOOD TEST: CPT

## 2021-12-02 PROCEDURE — 2580000003 HC RX 258: Performed by: INTERNAL MEDICINE

## 2021-12-02 PROCEDURE — 36415 COLL VENOUS BLD VENIPUNCTURE: CPT

## 2021-12-02 PROCEDURE — 1200000000 HC SEMI PRIVATE

## 2021-12-02 RX ORDER — ERGOCALCIFEROL 1.25 MG/1
50000 CAPSULE ORAL WEEKLY
Status: DISCONTINUED | OUTPATIENT
Start: 2021-12-05 | End: 2021-12-09 | Stop reason: HOSPADM

## 2021-12-02 RX ADMIN — Medication 10 ML: at 21:55

## 2021-12-02 RX ADMIN — FINASTERIDE 5 MG: 5 TABLET, FILM COATED ORAL at 08:25

## 2021-12-02 RX ADMIN — RISPERIDONE 0.25 MG: 0.25 TABLET, FILM COATED ORAL at 21:51

## 2021-12-02 RX ADMIN — INSULIN LISPRO 2 UNITS: 100 INJECTION, SOLUTION INTRAVENOUS; SUBCUTANEOUS at 22:46

## 2021-12-02 RX ADMIN — METOPROLOL SUCCINATE 25 MG: 25 TABLET, FILM COATED, EXTENDED RELEASE ORAL at 08:25

## 2021-12-02 RX ADMIN — OLANZAPINE 5 MG: 5 TABLET, FILM COATED ORAL at 08:27

## 2021-12-02 RX ADMIN — CITALOPRAM HYDROBROMIDE 40 MG: 20 TABLET ORAL at 08:25

## 2021-12-02 RX ADMIN — DEXAMETHASONE SODIUM PHOSPHATE 10 MG: 10 INJECTION INTRAMUSCULAR; INTRAVENOUS at 11:26

## 2021-12-02 RX ADMIN — TAMSULOSIN HYDROCHLORIDE 0.4 MG: 0.4 CAPSULE ORAL at 08:25

## 2021-12-02 RX ADMIN — APIXABAN 10 MG: 5 TABLET, FILM COATED ORAL at 21:53

## 2021-12-02 RX ADMIN — DEXAMETHASONE SODIUM PHOSPHATE 10 MG: 10 INJECTION INTRAMUSCULAR; INTRAVENOUS at 21:52

## 2021-12-02 RX ADMIN — Medication 500 MG: at 08:25

## 2021-12-02 RX ADMIN — ENOXAPARIN SODIUM 80 MG: 100 INJECTION SUBCUTANEOUS at 08:25

## 2021-12-02 RX ADMIN — Medication 2000 UNITS: at 08:25

## 2021-12-02 RX ADMIN — FENOFIBRATE 160 MG: 160 TABLET ORAL at 08:25

## 2021-12-02 RX ADMIN — PANTOPRAZOLE SODIUM 40 MG: 40 TABLET, DELAYED RELEASE ORAL at 06:51

## 2021-12-02 RX ADMIN — TAMSULOSIN HYDROCHLORIDE 0.4 MG: 0.4 CAPSULE ORAL at 21:52

## 2021-12-02 RX ADMIN — FERROUS SULFATE TAB 325 MG (65 MG ELEMENTAL FE) 325 MG: 325 (65 FE) TAB at 16:53

## 2021-12-02 RX ADMIN — ZINC SULFATE 220 MG (50 MG) CAPSULE 50 MG: CAPSULE at 08:25

## 2021-12-02 RX ADMIN — Medication 10 ML: at 08:26

## 2021-12-02 RX ADMIN — RISPERIDONE 0.25 MG: 0.25 TABLET, FILM COATED ORAL at 08:25

## 2021-12-02 RX ADMIN — BARICITINIB 4 MG: 2 TABLET, FILM COATED ORAL at 08:25

## 2021-12-02 RX ADMIN — EZETIMIBE 10 MG: 10 TABLET ORAL at 08:25

## 2021-12-02 RX ADMIN — INSULIN LISPRO 2 UNITS: 100 INJECTION, SOLUTION INTRAVENOUS; SUBCUTANEOUS at 16:54

## 2021-12-02 RX ADMIN — INSULIN LISPRO 2 UNITS: 100 INJECTION, SOLUTION INTRAVENOUS; SUBCUTANEOUS at 11:45

## 2021-12-02 RX ADMIN — INSULIN LISPRO 2 UNITS: 100 INJECTION, SOLUTION INTRAVENOUS; SUBCUTANEOUS at 06:51

## 2021-12-02 RX ADMIN — FERROUS SULFATE TAB 325 MG (65 MG ELEMENTAL FE) 325 MG: 325 (65 FE) TAB at 08:25

## 2021-12-02 ASSESSMENT — PAIN SCALES - GENERAL
PAINLEVEL_OUTOF10: 0
PAINLEVEL_OUTOF10: 0

## 2021-12-02 NOTE — CARE COORDINATION
Social Work / Discharge Planning:    Social work met with patient and discussed discharge recent [address] 14/24 and possible need for short term rehab and patient is agreeable. Attempted to contact patient's son Lotus Clulen (ph: [phone]) and left voice message requesting return call. Called Osiris Rivera with [facility] and provided referral. Informed if facility can accept will need precert. Social work will continue to follow. Electronically signed by ZUHAIR Drummond on 12/2/21 at 11:20 AM EST    Addendum:  Per Osiris Rivera with [facility], facility will accept and will start precert. MARV in Epic, transportation form placed in chart. HENS completed.  Electronically signed by ZUHAIR Drummond on 12/2/21 at 2:38 PM EST

## 2021-12-02 NOTE — PLAN OF CARE
Problem: Gas Exchange - Impaired  Goal: Absence of hypoxia  Outcome: Met This Shift     Problem: Skin Integrity:  Goal: Will show no infection signs and symptoms  Description: Will show no infection signs and symptoms  Outcome: Met This Shift  Goal: Absence of new skin breakdown  Description: Absence of new skin breakdown  Outcome: Met This Shift

## 2021-12-02 NOTE — PROGRESS NOTES
[facility] Hospitalist   Progress Note    Admitting Date and Time: 11/28/2021  9:30 PM  Admit Dx: Respiratory failure ([address] [J96.90]  Multiple subsegmental pulmonary emboli without acute cor pulmonale (HCC) [I26.94]  Acute respiratory failure due to COVID-19 (HCC) [U07.1, J96.00]    Subjective:    Patient was admitted with Respiratory failure ([address] [J96.90]  Multiple subsegmental pulmonary emboli without acute cor pulmonale (HCC) [I26.94]  Acute respiratory failure due to COVID-19 ([address] [U07.1, J96.00]. Does not have abdominal pain today. Patient has increasing oxygen needs. ROS: denies fever, chills, cp, sob, n/v, HA unless stated above.      dexamethasone  10 mg IntraVENous Q12H    Followed by   Soham Jeffries ON 12/6/2021] dexamethasone  10 mg IntraVENous Q24H    citalopram  40 mg Oral Daily    ezetimibe  10 mg Oral Daily    fenofibrate  160 mg Oral Daily    ferrous sulfate  325 mg Oral BID    finasteride  5 mg Oral Daily    metoprolol succinate  25 mg Oral Daily    OLANZapine  5 mg Oral Daily    pantoprazole  40 mg Oral QAM AC    risperiDONE  0.25 mg Oral BID    tamsulosin  0.4 mg Oral BID    vitamin D  50,000 Units Oral Weekly    sodium chloride flush  5-40 mL IntraVENous 2 times per day    enoxaparin  1 mg/kg SubCUTAneous BID    ascorbic acid  500 mg Oral Daily    Vitamin D  2,000 Units Oral Daily    zinc sulfate  50 mg Oral Daily    baricitinib  4 mg Oral Daily    insulin lispro  0-6 Units SubCUTAneous TID WC    insulin lispro  0-3 Units SubCUTAneous Nightly     HYDROcodone-acetaminophen, 1 tablet, Q6H PRN  nitroGLYCERIN, 0.4 mg, Q5 Min PRN  sodium chloride flush, 5-40 mL, PRN  sodium chloride, 25 mL, PRN  ondansetron, 4 mg, Q8H PRN   Or  ondansetron, 4 mg, Q6H PRN  polyethylene glycol, 17 g, Daily PRN  acetaminophen, 650 mg, Q6H PRN   Or  acetaminophen, 650 mg, Q6H PRN  glucose, 15 g, PRN  dextrose, 12.5 g, PRN  glucagon (rDNA), 1 mg, PRN  dextrose, 100 mL/hr, PRN Objective:    /70   Pulse 60   Temp 98.7 °F (37.1 °C) (Oral)   Resp 18   Ht 5' 6\" (1.676 m)   Wt 181 lb 6.4 oz (82.3 kg)   SpO2 93%   BMI 29.28 kg/m²   General Appearance: alert and oriented to person, place and time, well developed and well- nourished, in no acute distress  Skin: warm and dry, no rash or erythema  Head: normocephalic and atraumatic  Neck: supple and non-tender  Pulmonary/Chest: Decreased sounds bilaterally, coarse breath sounds with scattered crackles especially bases  Cardiovascular: Regular, no murmur appreciated  Abdomen: soft, non-tender, non-distended, I cannot see any external bruising or feel any masses in his abdominal musculature  Extremities: no edema    Recent Labs     11/28/21  2155      K 3.3*      CO2 18*   BUN 34*   CREATININE 1.1   GLUCOSE 210*   CALCIUM 9.2       Recent Labs     11/28/21  2155 11/29/21  0740 12/01/21  0335   WBC 8.7 8.1 6.9   RBC 3.71* 3.78* 3.25*   HGB 11.0* 11.2* 9.6*   HCT 31.7* 33.3* 28.3*   MCV 85.4 88.1 87.1   MCH 29.6 29.6 29.5   MCHC 34.7* 33.6 33.9   RDW 12.8 13.0 12.8    232 227   MPV 11.7 11.8 12.0       Radiology:   XR CHEST PORTABLE   Final Result   Stable bilateral pulmonary consolidation, compatible with the history of   COVID-19 pneumonia. CTA PULMONARY W CONTRAST   Final Result   CT chest:      Tiny bilateral lower lobe subsegmental pulmonary emboli. No large or central   embolism and no evidence of right heart strain. Bilateral parenchymal infiltrates concerning for multifocal pneumonia,   probably viral.  Localized airspace opacities in the base of the lower lobes   are also likely due to pneumonia but short-term follow-up is recommended to   exclude the unlikely possibility of neoplasia. Scattered mediastinal lymph nodes favored to be reactive.       CT abdomen and pelvis:      Asymmetric thickening of the right rectus abdominus musculature in the   anterior pelvic wall concerning for a rectus sheath hematoma with possible   faint blush of active hemorrhage as detailed above. Other etiologies   including neoplasia thought unlikely. Correlation with clinical presentation   and continued short-term follow-up recommended. Small hiatal hernia. Mild diverticulosis. Other chronic appearing findings in the abdomen and pelvis. Findings were discussed with Dr. Arron Abbott at approximately 0230 hours [place]   time on 11/29/2021. CT ABDOMEN PELVIS W IV CONTRAST Additional Contrast? None   Final Result   CT chest:      Tiny bilateral lower lobe subsegmental pulmonary emboli. No large or central   embolism and no evidence of right heart strain. Bilateral parenchymal infiltrates concerning for multifocal pneumonia,   probably viral.  Localized airspace opacities in the base of the lower lobes   are also likely due to pneumonia but short-term follow-up is recommended to   exclude the unlikely possibility of neoplasia. Scattered mediastinal lymph nodes favored to be reactive. CT abdomen and pelvis:      Asymmetric thickening of the right rectus abdominus musculature in the   anterior pelvic wall concerning for a rectus sheath hematoma with possible   faint blush of active hemorrhage as detailed above. Other etiologies   including neoplasia thought unlikely. Correlation with clinical presentation   and continued short-term follow-up recommended. Small hiatal hernia. Mild diverticulosis. Other chronic appearing findings in the abdomen and pelvis. Findings were discussed with Dr. Arron Abbott at approximately 0230 hours [place]   time on 11/29/2021. XR CHEST PORTABLE   Final Result   Extensive airspace disease throughout the lungs bilaterally consistent with   multifocal pneumonia. Assessment:    Active Problems:    Respiratory failure ([address]  Resolved Problems:    * No resolved hospital problems. *      Plan:    1.   Covid 19 pneumonia with acute hypoxic respiratory failure  Appreciate pulmonary help. Patient still on same amount of oxygen but does feel better. Continues on baricitinib. Due to worsening hypoxia dexamethasone was increased to 10 mg twice daily. Follow inflammatory labs, CRP greatly elevated. 2.  Pulmonary embolus  Patient started on Lovenox 1 mg/kg. There is some concern on abdominal CT for rectus sheath hematoma but I cannot discern this on exam.    Patient's hemoglobin has gone down to 9.6 so we will follow this very closely. If he cannot take anticoagulation will need IVC filter per pulmonary. 3.  Diabetes  Currently sugars all under 200. Currently only on low-dose sliding scale. We will increase as needed.     Electronically signed by Apollo Basurto MD on 12/1/2021 at 7:39 PM

## 2021-12-02 NOTE — DISCHARGE INSTR - COC
Continuity of Care Form    Patient Name: Krys Katz   :  1950  MRN:  [id number]    [facility] date:  2021  Discharge date:  2021    Code Status Order: Full Code   Advance Directives:      Admitting Physician:  Lee Elizondo DO  PCP: Rigo Duong DO    Discharging Nurse: Gabriela Carroll Unit/Room#: 4930/8155-P  Discharging Unit Phone Number: [phone]    Emergency Contact:   Extended Emergency Contact Information  Primary Emergency Contact: Adela Trejo  Address: [address] Phone: [phone]  Mobile Phone: [phone]  Relation: Spouse  Secondary Emergency Contact: [address] [place] [address] Phone: [phone]  Relation: Child    Past Surgical History:  Past Surgical History:   Procedure Laterality Date    APPENDECTOMY      APPENDECTOMY      CATARACT REMOVAL WITH IMPLANT Bilateral     CORONARY ANGIOPLASTY WITH STENT PLACEMENT  early     CORONARY ANGIOPLASTY WITH STENT PLACEMENT      CYST REMOVAL      On top of his head    EYE SURGERY      Child- [place]  2004    Right Knee-orthoscopic surgery    TOOTH EXTRACTION  10/11/2017    full mouth extractions       Immunization History:   Immunization History   Administered Date(s) Administered    Influenza Vaccine, unspecified formulation 10/29/2016    Influenza Virus Vaccine 10/29/2016    Influenza, High Dose (Fluzone 65 yrs and older) 2016, 10/24/2019, 2020    Influenza, Triv, inactivated, subunit, adjuvanted, IM (Fluad 65 yrs and older) 10/24/2019    Pneumococcal Polysaccharide (Bmadlynqr88) 10/08/2019       Active Problems:  Patient Active Problem List   Diagnosis Code    CAD (coronary artery disease) I25.10    Hypogonadism male E29.1    Hyperlipemia E78.5    Primary osteoarthritis of left knee M17.12    Baker's cyst of knee, right M71.21    Primary osteoarthritis of right knee M17.11    Baker's cyst of knee, left M71.22    Vitamin D deficiency E55.9    Respiratory failure ([facility] J96.90    Acute respiratory failure due to COVID-19 ([facility] U07.1, J96.00       Isolation/Infection:   Isolation            Droplet Plus          Patient Infection Status       Infection Onset Added Last Indicated Last Indicated By Review Planned Expiration Resolved Resolved By    COVID-19 11/22/21 11/30/21 11/22/21 COVID-19 12/07/21 12/06/21      Resolved    COVID-19 (Rule Out) 10/27/21 10/27/21 10/27/21 COVID-19 & Influenza Combo (Ordered)   10/27/21 Rule-Out Test Resulted            Nurse Assessment:  Last Vital Signs: /61   Pulse 73   Temp 97.7 °F (36.5 °C) (Oral)   Resp 18   Ht 5' 6\" (1.676 m)   Wt 181 lb 6.4 oz (82.3 kg)   SpO2 92%   BMI 29.28 kg/m²     Last documented pain score (0-10 scale): Pain Level: 0  Last Weight:   Wt Readings from Last 1 Encounters:   11/28/21 181 lb 6.4 oz (82.3 kg)     Mental Status:  oriented and alert    IV Access:  - None    Nursing Mobility/ADLs:  Walking   Assisted  Transfer  Assisted  Bathing  Assisted  Dressing  Assisted  Toileting  Assisted  Feeding  Independent  Med Admin  Dependent  Med Delivery   crushed and prefers mixed with applesauce    Wound Care Documentation and Therapy:        Elimination:  Continence: Bowel: Yes  Bladder: No  Urinary Catheter: None   Colostomy/Ileostomy/Ileal Conduit: No       Date of Last BM: 12/08      Intake/Output Summary (Last 24 hours) at 12/2/2021 1424  Last data filed at 12/2/2021 1225  Gross per 24 hour   Intake 480 ml   Output 600 ml   Net -120 ml     I/O last 3 completed shifts:   In: 65 [P.O.:660]  Out: 250 [Urine:250]    Safety Concerns:     [employer] Safety Concerns:733760360}    Impairments/Disabilities:      Language Barrier - no teeth/hard to understand at times    Nutrition Therapy:  Current Nutrition Therapy:   [employer] Diet List:977649543}    Routes of Feeding: {CHP DME Other Feedings:981917746}  Liquids: {Slp liquid thickness:87370}  Daily Fluid Restriction: {CHP DME Yes amt example:774408023}  Last Modified Barium Swallow with Video (Video Swallowing Test): {Done Not Done QINW:939115510}    Treatments at the Time of Hospital Discharge:   Respiratory Treatments: ***  Oxygen Therapy:  is on oxygen at 5 L/min per nasal cannula. Ventilator:    { CC Vent BTQB:697804413}    Rehab Therapies: Physical Therapy and Occupational Therapy  Weight Bearing Status/Restrictions: [facility] CC Weight Bearin}  Other Medical Equipment (for information only, NOT a DME order):  {EQUIPMENT:487119830}  Other Treatments: ***    Patient's personal belongings (please select all that are sent with patient):  {CHP DME Belongings:583615021}    RN SIGNATURE:  {Esignature:531996330}    CASE MANAGEMENT/SOCIAL WORK SECTION    Inpatient Status Date: ***    Readmission Risk Assessment Score:  Readmission Risk              Risk of Unplanned Readmission:  18           Discharging to Facility/ Agency   Name: [facility]  Address: [address] [place] [address], [facility] [address] [facility]  Phone: [phone]  Fax: [phone]    Dialysis Facility (if applicable)   Name:  Address:  Dialysis Schedule:  Phone:  Fax:    / signature: Electronically signed by SELWYN Escoto, ZUHAIR on 21 at 2:25 PM EST    PHYSICIAN SECTION    Prognosis: {Prognosis:2695580857}    Condition at Discharge: Stable    Rehab Potential (if transferring to Rehab): {Prognosis:3324797600}    Recommended Labs or Other Treatments After Discharge: ***    Physician Certification: I certify the above information and transfer of Araceli Aguiar  is necessary for the continuing treatment of the diagnosis listed and that he requires {Admit to Appropriate Level of Care:02797} for less 30 days.      Update Admission H&P: {CHP DME Changes in WUGXQ:286512035}    PHYSICIAN SIGNATURE:  Jeff Rivera MD.

## 2021-12-02 NOTE — PROGRESS NOTES
Vika Livingston M.D.,[facility]  MAMADOU FarfanO., F.A.C.OWillieI., Katarina Dias M.D. Jackie Brooks M.D. Gabriel Anaya D.O. Daily Pulmonary Progress Note    Patient:  Keira Rodríguez 66 y.o. male MRN: [id number]     Date of Service: 12/2/2021      Synopsis     We are following patient for respiratory failure with hypoxia, shortness of breath, covid 19 pneumonia, PE    \"CC\" shortness of breath     Code status: FULL       Subjective      No acute events overnight. Personally seen and examined. Sitting up eating lunch oxygen at 11 liters NC. SPO2 92% at rest. Occasional cough. Dyspnea with exertion. Hgb 10.5 will transition to eliquis today. Review of Systems:  Constitutional: Positive for appetite change and fatigue. Negative for chills and fever. HENT: Negative for postnasal drip, rhinorrhea and sore throat. Eyes: Negative for visual disturbance. Respiratory: Positive for cough and shortness of breath. Cardiovascular: Negative for chest pain. Gastrointestinal: Positive for diarrhea. Negative for abdominal pain, blood in stool, constipation, nausea and vomiting. Genitourinary: Negative for difficulty urinating, dysuria and urgency. Musculoskeletal: Negative for back pain. Skin: Negative for rash. Neurological: Positive for light-headedness. Negative for dizziness, numbness and headaches.      24-hour events:  None     Objective   Vitals: /61   Pulse 73   Temp 97.7 °F (36.5 °C) (Oral)   Resp 18   Ht 5' 6\" (1.676 m)   Wt 181 lb 6.4 oz (82.3 kg)   SpO2 92%   BMI 29.28 kg/m²     I/O:    Intake/Output Summary (Last 24 hours) at 12/2/2021 1419  Last data filed at 12/2/2021 1225  Gross per 24 hour   Intake 480 ml   Output 600 ml   Net -120 ml       Vent Information  SpO2: 92 %                CURRENT MEDS :  Scheduled Meds:   [START ON 12/5/2021] vitamin D  50,000 Units Oral Weekly    apixaban  10 mg Oral BID    Followed by   [facility] ON 12/9/2021] apixaban  5 mg Oral BID    dexamethasone  10 mg IntraVENous Q12H    Followed by   Nabeel Klein ON 12/6/2021] dexamethasone  10 mg IntraVENous Q24H    citalopram  40 mg Oral Daily    ezetimibe  10 mg Oral Daily    fenofibrate  160 mg Oral Daily    ferrous sulfate  325 mg Oral BID    finasteride  5 mg Oral Daily    metoprolol succinate  25 mg Oral Daily    OLANZapine  5 mg Oral Daily    pantoprazole  40 mg Oral QAM AC    risperiDONE  0.25 mg Oral BID    tamsulosin  0.4 mg Oral BID    sodium chloride flush  5-40 mL IntraVENous 2 times per day    ascorbic acid  500 mg Oral Daily    Vitamin D  2,000 Units Oral Daily    zinc sulfate  50 mg Oral Daily    baricitinib  4 mg Oral Daily    insulin lispro  0-6 Units SubCUTAneous TID WC    insulin lispro  0-3 Units SubCUTAneous Nightly       Physical Exam:   eneral: Lying in bed comfortably, no distress, breathing is not labored  HEENT: PERRL, EOMI, MMM, no oral lesions  Neck: supple, no adenopathy  CV: RRR without murmur  Lungs: clear to auscultation bilaterally without wheezing, no crackles  Abd: soft, NT, ND, bowel sounds normal  Ext: warm, no edema, no clubbing  Skin: no rashes  Neuro: CN II-XII grossly intact, no focal deficits    Pertinent/ New Labs and Imaging Studies     Imaging Personally Reviewed:  Chest x-ray 12/1/2021  FINDINGS:   History of COVID per EMR.       Grossly stable bilateral pulmonary consolidation.       No evidence of pneumothorax or pneumomediastinum.       The heart is not significantly enlarged.       No significant pleural effusion.       Bones are stable.           Impression   Stable bilateral pulmonary consolidation, compatible with the history of   COVID-19 pneumonia.             CTA chest/CT abdomen  Impression   CT chest:       Tiny bilateral lower lobe subsegmental pulmonary emboli. No large or central   embolism and no evidence of right heart strain.        Bilateral parenchymal infiltrates concerning for multifocal pneumonia,   probably viral.  Localized airspace opacities in the base of the lower lobes   are also likely due to pneumonia but short-term follow-up is recommended to   exclude the unlikely possibility of neoplasia. Scattered mediastinal lymph nodes favored to be reactive. CT abdomen and pelvis:       Asymmetric thickening of the right rectus abdominus musculature in the   anterior pelvic wall concerning for a rectus sheath hematoma with possible   faint blush of active hemorrhage as detailed above. Other etiologies   including neoplasia thought unlikely. Correlation with clinical presentation   and continued short-term follow-up recommended. Small hiatal hernia. Mild diverticulosis. Other chronic appearing findings in the abdomen and pelvis. Findings were discussed with Dr. Blanchard at approximately 0230 hours [place]   time on 11/29/2021. CRP 1.1     D dimer 3495          Labs:  Lab Results   Component Value Date    WBC 3.5 12/02/2021    HGB 10.5 12/02/2021    HCT 31.3 12/02/2021    MCV 88.7 12/02/2021    MCH 29.7 12/02/2021    MCHC 33.5 12/02/2021    RDW 13.0 12/02/2021     12/02/2021    MPV 12.3 12/02/2021     Lab Results   Component Value Date     11/28/2021    K 3.3 11/28/2021     11/28/2021    CO2 18 11/28/2021    BUN 34 11/28/2021    CREATININE 1.1 11/28/2021    LABALBU 3.4 11/28/2021    CALCIUM 9.2 11/28/2021    GFRAA >60 11/28/2021    LABGLOM >60 11/28/2021     Lab Results   Component Value Date    PROTIME 15.5 11/29/2021    INR 1.4 11/29/2021     No results for input(s): PROBNP in the last 72 hours. Recent Labs     12/01/21  0335   PROCAL 0.43*     This SmartLink has not been configured with any valid records. Micro:  No results for input(s): CULTRESP in the last 72 hours. No results for input(s): LABGRAM in the last 72 hours. No results for input(s): LEGUR in the last 72 hours. No results for input(s): STREPNEUMAGU in the last 72 hours.   No results for input(s): LP1UAG in the last 72 hours. Original date of  testin2021  Vaccination status: Not vaccinated  Current oxygen delivery system: 11 L high flow nasal cannula  Dexamethasone dosin mg p.o. daily    start date: 2021 increased to 10 mg IV twice daily 2021  Remdesivir Y/N:    None       start date:  Baricitinib Y/N:    Yes        start date 4 mg daily for 14 days started 2021  Tocilizumab Y/N:   None      date given:  Respiratory cultures Y/N:      date/results:  Strep pneumoniae and Legionella Urine Antigen test Y/N: results: None  Antibiotics Y/N with start and stop dates: None  CRP: 27.4> 12.0>21.4  D Dimer: 3495  INR 1.4  Procalcitonin: 1.00> 0.43  Ferritin 171  Sed rate: 80  DVT prophylaxis: Lovenox 1 mg/kg twice daily  GI prophylaxis: Protonix   PT/OT: Ordered  Lung recruitment techniques: incentive spirometer       Assessment:    1. Acute respiratory failure with hypoxia  2. Moderate COVID-19 pneumonia  3. Pulmonary embolism provoked by COVID-19 infection  4. Question of rectus sheath hematoma  5. Diabetes mellitus type 2  6. Lymphopenia related to viral infection  7. Schizophrenia      Plan:   1. Oxygen therapy 11 L high flow cannula wean to keep saturation greater than 88% add humidity to oxygen  2. Dexamethasone increase to 10 mg IV BID 12/1/21 x 5 days then decrease to daily dosing, vitamin cocktail  3. Start eliquis 10 mg po bid x 7 days then decrease to 5 mg po bid maintenance dosing. Monitor for increasing rectus sheath hematoma and for anemia. Any signs of bleeding or anemia will need IVC filter. Hgb 10.5   4.  incentive spirometer  5. Trend inflammatory markers crp up to 21.4 today  6. cxr 21  7. Incentive spirometer, lung recruitment maneuvers  8.  Dvt, gi prophylaxis    This plan of care was reviewed in collaboration with Dr. Jodi Chu   Electronically signed by TYSON Sosa CNP on 2021 at 2:19 PM     I personally saw, examined, and cared for the patient. Labs, medications, radiographs reviewed.  I agree with history exam and plans detailed in NP note with the following additions:    Remains on 11L  CRP rising today - monitor for signs of superimposed bacterial pneumonia  On baricitinib and dex BID    Electronically signed by Merari Garcia MD on 12/2/2021 at 9:12 PM

## 2021-12-03 LAB
ALBUMIN SERPL-MCNC: 2.9 G/DL (ref 3.5–5.2)
ALP BLD-CCNC: 32 U/L (ref 40–129)
ALT SERPL-CCNC: 44 U/L (ref 0–40)
ANION GAP SERPL CALCULATED.3IONS-SCNC: 9 MMOL/L (ref 7–16)
AST SERPL-CCNC: 30 U/L (ref 0–39)
BASOPHILS ABSOLUTE: 0 E9/L (ref 0–0.2)
BASOPHILS RELATIVE PERCENT: 0 % (ref 0–2)
BILIRUB SERPL-MCNC: 0.5 MG/DL (ref 0–1.2)
BUN BLDV-MCNC: 28 MG/DL (ref 6–23)
CALCIUM SERPL-MCNC: 8.9 MG/DL (ref 8.6–10.2)
CHLORIDE BLD-SCNC: 104 MMOL/L (ref 98–107)
CO2: 23 MMOL/L (ref 22–29)
CREAT SERPL-MCNC: 0.7 MG/DL (ref 0.7–1.2)
EOSINOPHILS ABSOLUTE: 0 E9/L (ref 0.05–0.5)
EOSINOPHILS RELATIVE PERCENT: 0 % (ref 0–6)
GFR AFRICAN AMERICAN: >60
GFR NON-AFRICAN AMERICAN: >60 ML/MIN/1.73
GLUCOSE BLD-MCNC: 183 MG/DL (ref 74–99)
HCT VFR BLD CALC: 29.6 % (ref 37–54)
HEMOGLOBIN: 9.9 G/DL (ref 12.5–16.5)
IMMATURE GRANULOCYTES #: 0.07 E9/L
IMMATURE GRANULOCYTES %: 1 % (ref 0–5)
LACTATE DEHYDROGENASE: 370 U/L (ref 135–225)
LYMPHOCYTES ABSOLUTE: 0.34 E9/L (ref 1.5–4)
LYMPHOCYTES RELATIVE PERCENT: 4.7 % (ref 20–42)
MAGNESIUM: 2.2 MG/DL (ref 1.6–2.6)
MCH RBC QN AUTO: 29.6 PG (ref 26–35)
MCHC RBC AUTO-ENTMCNC: 33.4 % (ref 32–34.5)
MCV RBC AUTO: 88.4 FL (ref 80–99.9)
METER GLUCOSE: 179 MG/DL (ref 74–99)
METER GLUCOSE: 193 MG/DL (ref 74–99)
METER GLUCOSE: 207 MG/DL (ref 74–99)
MONOCYTES ABSOLUTE: 0.13 E9/L (ref 0.1–0.95)
MONOCYTES RELATIVE PERCENT: 1.8 % (ref 2–12)
NEUTROPHILS ABSOLUTE: 6.7 E9/L (ref 1.8–7.3)
NEUTROPHILS RELATIVE PERCENT: 92.5 % (ref 43–80)
OVALOCYTES: ABNORMAL
PDW BLD-RTO: 12.6 FL (ref 11.5–15)
PHOSPHORUS: 2.4 MG/DL (ref 2.5–4.5)
PLATELET # BLD: 268 E9/L (ref 130–450)
PMV BLD AUTO: 12.2 FL (ref 7–12)
POIKILOCYTES: ABNORMAL
POTASSIUM SERPL-SCNC: 4.4 MMOL/L (ref 3.5–5)
RBC # BLD: 3.35 E12/L (ref 3.8–5.8)
SODIUM BLD-SCNC: 136 MMOL/L (ref 132–146)
TOTAL PROTEIN: 6 G/DL (ref 6.4–8.3)
WBC # BLD: 7.2 E9/L (ref 4.5–11.5)

## 2021-12-03 PROCEDURE — 6370000000 HC RX 637 (ALT 250 FOR IP): Performed by: INTERNAL MEDICINE

## 2021-12-03 PROCEDURE — 83615 LACTATE (LD) (LDH) ENZYME: CPT

## 2021-12-03 PROCEDURE — 82962 GLUCOSE BLOOD TEST: CPT

## 2021-12-03 PROCEDURE — 80053 COMPREHEN METABOLIC PANEL: CPT

## 2021-12-03 PROCEDURE — 6370000000 HC RX 637 (ALT 250 FOR IP): Performed by: NURSE PRACTITIONER

## 2021-12-03 PROCEDURE — 6360000002 HC RX W HCPCS: Performed by: NURSE PRACTITIONER

## 2021-12-03 PROCEDURE — 83735 ASSAY OF MAGNESIUM: CPT

## 2021-12-03 PROCEDURE — 86140 C-REACTIVE PROTEIN: CPT

## 2021-12-03 PROCEDURE — 84100 ASSAY OF PHOSPHORUS: CPT

## 2021-12-03 PROCEDURE — 2700000000 HC OXYGEN THERAPY PER DAY

## 2021-12-03 PROCEDURE — 1200000000 HC SEMI PRIVATE

## 2021-12-03 PROCEDURE — 36415 COLL VENOUS BLD VENIPUNCTURE: CPT

## 2021-12-03 PROCEDURE — 85025 COMPLETE CBC W/AUTO DIFF WBC: CPT

## 2021-12-03 PROCEDURE — 2580000003 HC RX 258: Performed by: INTERNAL MEDICINE

## 2021-12-03 PROCEDURE — 99232 SBSQ HOSP IP/OBS MODERATE 35: CPT | Performed by: INTERNAL MEDICINE

## 2021-12-03 RX ADMIN — DEXAMETHASONE SODIUM PHOSPHATE 10 MG: 10 INJECTION INTRAMUSCULAR; INTRAVENOUS at 21:53

## 2021-12-03 RX ADMIN — DEXAMETHASONE SODIUM PHOSPHATE 10 MG: 10 INJECTION INTRAMUSCULAR; INTRAVENOUS at 11:07

## 2021-12-03 RX ADMIN — APIXABAN 10 MG: 5 TABLET, FILM COATED ORAL at 08:36

## 2021-12-03 RX ADMIN — Medication 500 MG: at 08:36

## 2021-12-03 RX ADMIN — PANTOPRAZOLE SODIUM 40 MG: 40 TABLET, DELAYED RELEASE ORAL at 06:32

## 2021-12-03 RX ADMIN — FERROUS SULFATE TAB 325 MG (65 MG ELEMENTAL FE) 325 MG: 325 (65 FE) TAB at 08:36

## 2021-12-03 RX ADMIN — TAMSULOSIN HYDROCHLORIDE 0.4 MG: 0.4 CAPSULE ORAL at 21:53

## 2021-12-03 RX ADMIN — FENOFIBRATE 160 MG: 160 TABLET ORAL at 08:36

## 2021-12-03 RX ADMIN — METOPROLOL SUCCINATE 25 MG: 25 TABLET, FILM COATED, EXTENDED RELEASE ORAL at 08:36

## 2021-12-03 RX ADMIN — OLANZAPINE 5 MG: 5 TABLET, FILM COATED ORAL at 08:36

## 2021-12-03 RX ADMIN — TAMSULOSIN HYDROCHLORIDE 0.4 MG: 0.4 CAPSULE ORAL at 08:37

## 2021-12-03 RX ADMIN — BARICITINIB 4 MG: 2 TABLET, FILM COATED ORAL at 08:36

## 2021-12-03 RX ADMIN — CITALOPRAM HYDROBROMIDE 40 MG: 20 TABLET ORAL at 08:36

## 2021-12-03 RX ADMIN — FINASTERIDE 5 MG: 5 TABLET, FILM COATED ORAL at 08:36

## 2021-12-03 RX ADMIN — RISPERIDONE 0.25 MG: 0.25 TABLET, FILM COATED ORAL at 21:53

## 2021-12-03 RX ADMIN — Medication 10 ML: at 21:00

## 2021-12-03 RX ADMIN — FERROUS SULFATE TAB 325 MG (65 MG ELEMENTAL FE) 325 MG: 325 (65 FE) TAB at 16:43

## 2021-12-03 RX ADMIN — Medication 10 ML: at 08:37

## 2021-12-03 RX ADMIN — INSULIN LISPRO 2 UNITS: 100 INJECTION, SOLUTION INTRAVENOUS; SUBCUTANEOUS at 16:43

## 2021-12-03 RX ADMIN — INSULIN LISPRO 2 UNITS: 100 INJECTION, SOLUTION INTRAVENOUS; SUBCUTANEOUS at 11:56

## 2021-12-03 RX ADMIN — APIXABAN 10 MG: 5 TABLET, FILM COATED ORAL at 21:53

## 2021-12-03 RX ADMIN — INSULIN LISPRO 2 UNITS: 100 INJECTION, SOLUTION INTRAVENOUS; SUBCUTANEOUS at 07:21

## 2021-12-03 RX ADMIN — EZETIMIBE 10 MG: 10 TABLET ORAL at 08:36

## 2021-12-03 RX ADMIN — ZINC SULFATE 220 MG (50 MG) CAPSULE 50 MG: CAPSULE at 08:37

## 2021-12-03 RX ADMIN — RISPERIDONE 0.25 MG: 0.25 TABLET, FILM COATED ORAL at 08:36

## 2021-12-03 ASSESSMENT — PAIN SCALES - GENERAL
PAINLEVEL_OUTOF10: 0

## 2021-12-03 NOTE — CARE COORDINATION
+ covid no vax; currently on 10L 02 (NONE at home)[facility] pt; precert initiated 06/3; derrick N-17, transport forms on chart. Left VM message for son Diana Barrios; same. Will follow. [place].

## 2021-12-03 NOTE — PLAN OF CARE
Problem: Airway Clearance - Ineffective  Goal: Achieve or maintain patent airway  Outcome: Met This Shift     Problem: Breathing Pattern - Ineffective  Goal: Ability to achieve and maintain a regular respiratory rate  Outcome: Met This Shift     Problem: Risk for Fluid Volume Deficit  Goal: Maintain normal heart rhythm  Outcome: Met This Shift

## 2021-12-03 NOTE — CARE COORDINATION
Social Work / Discharge Planning:    Social work received call from patient's son (ph: [phone]) and he is agreeable to [facility]. Await for precert. MARV in Epic and transportation form in chart. HENS completed. Social work will continue to follow.  Electronically signed by ZUHAIR House on 12/3/21 at 12:17 PM EST

## 2021-12-03 NOTE — PROGRESS NOTES
Sayra Cain M.D.,[facility]  Lee George D.O., F.A.C.O.I., Carlos Mcduffie M.D. Merissa Pitts M.D. Radha Piña D.O. Daily Pulmonary Progress Note    Patient:  Araceli Aguiar 66 y.o. male MRN: [id number]     Date of Service: 12/3/2021      Synopsis     We are following patient for respiratory failure with hypoxia, shortness of breath, covid 19 pneumonia, PE    \"CC\" shortness of breath     Code status: FULL       Subjective      No acute events overnight. Personally seen and examined. Sitting up eating lunch oxygen at 10 liters NC. SPO2 92% at rest. Occasional cough. Dyspnea with exertion. Review of Systems:  Constitutional: Positive for appetite change and fatigue. Negative for chills and fever. HENT: Negative for postnasal drip, rhinorrhea and sore throat. Eyes: Negative for visual disturbance. Respiratory: Positive for cough and shortness of breath. Cardiovascular: Negative for chest pain. Gastrointestinal: Positive for diarrhea. Negative for abdominal pain, blood in stool, constipation, nausea and vomiting. Genitourinary: Negative for difficulty urinating, dysuria and urgency. Musculoskeletal: Negative for back pain. Skin: Negative for rash. Neurological: Positive for light-headedness. Negative for dizziness, numbness and headaches.      24-hour events:  None     Objective   Vitals: /60   Pulse 66   Temp 97.5 °F (36.4 °C) (Oral)   Resp 18   Ht 5' 6\" (1.676 m)   Wt 181 lb 6.4 oz (82.3 kg)   SpO2 91%   BMI 29.28 kg/m²     I/O:  No intake or output data in the 24 hours ending 12/03/21 1439    Vent Information  SpO2: 91 %                CURRENT MEDS :  Scheduled Meds:   [START ON 12/5/2021] vitamin D  50,000 Units Oral Weekly    apixaban  10 mg Oral BID    Followed by   Daksha Bazzi ON 12/9/2021] apixaban  5 mg Oral BID    insulin lispro  0-12 Units SubCUTAneous TID WC    insulin lispro  0-6 Units SubCUTAneous Nightly    dexamethasone  10 mg IntraVENous Q12H    Followed by   Phil Cramer ON 12/6/2021] dexamethasone  10 mg IntraVENous Q24H    citalopram  40 mg Oral Daily    ezetimibe  10 mg Oral Daily    fenofibrate  160 mg Oral Daily    ferrous sulfate  325 mg Oral BID    finasteride  5 mg Oral Daily    metoprolol succinate  25 mg Oral Daily    OLANZapine  5 mg Oral Daily    pantoprazole  40 mg Oral QAM AC    risperiDONE  0.25 mg Oral BID    tamsulosin  0.4 mg Oral BID    sodium chloride flush  5-40 mL IntraVENous 2 times per day    ascorbic acid  500 mg Oral Daily    zinc sulfate  50 mg Oral Daily    baricitinib  4 mg Oral Daily       Physical Exam:   eneral: Lying in bed comfortably, no distress, breathing is not labored  HEENT: PERRL, EOMI, MMM, no oral lesions  Neck: supple, no adenopathy  CV: RRR without murmur  Lungs: clear to auscultation bilaterally without wheezing, no crackles  Abd: soft, NT, ND, bowel sounds normal  Ext: warm, no edema, no clubbing  Skin: no rashes  Neuro: CN II-XII grossly intact, no focal deficits    Pertinent/ New Labs and Imaging Studies     Imaging Personally Reviewed:  Chest x-ray 12/1/2021  FINDINGS:   History of COVID per EMR.       Grossly stable bilateral pulmonary consolidation.       No evidence of pneumothorax or pneumomediastinum.       The heart is not significantly enlarged.       No significant pleural effusion.       Bones are stable.           Impression   Stable bilateral pulmonary consolidation, compatible with the history of   COVID-19 pneumonia.             CTA chest/CT abdomen  Impression   CT chest:       Tiny bilateral lower lobe subsegmental pulmonary emboli. No large or central   embolism and no evidence of right heart strain.        Bilateral parenchymal infiltrates concerning for multifocal pneumonia,   probably viral.  Localized airspace opacities in the base of the lower lobes   are also likely due to pneumonia but short-term follow-up is recommended to   exclude the unlikely possibility of neoplasia. Scattered mediastinal lymph nodes favored to be reactive. CT abdomen and pelvis:       Asymmetric thickening of the right rectus abdominus musculature in the   anterior pelvic wall concerning for a rectus sheath hematoma with possible   faint blush of active hemorrhage as detailed above. Other etiologies   including neoplasia thought unlikely. Correlation with clinical presentation   and continued short-term follow-up recommended. Small hiatal hernia. Mild diverticulosis. Other chronic appearing findings in the abdomen and pelvis. Findings were discussed with Dr. Deondre Lee at approximately 0230 hours [place]   time on 2021. CRP 1.1     D dimer 3495    Labs:  Lab Results   Component Value Date    WBC 7.2 2021    HGB 9.9 2021    HCT 29.6 2021    MCV 88.4 2021    MCH 29.6 2021    MCHC 33.4 2021    RDW 12.6 2021     2021    MPV 12.2 2021     Lab Results   Component Value Date     2021    K 4.4 2021    K 3.3 2021     2021    CO2 23 2021    BUN 28 2021    CREATININE 0.7 2021    LABALBU 2.9 2021    CALCIUM 8.9 2021    GFRAA >60 2021    LABGLOM >60 2021     Lab Results   Component Value Date    PROTIME 15.5 2021    INR 1.4 2021     No results for input(s): PROBNP in the last 72 hours. Recent Labs     21  0335   PROCAL 0.43*     This SmartLink has not been configured with any valid records. Micro:  No results for input(s): CULTRESP in the last 72 hours. No results for input(s): LABGRAM in the last 72 hours. No results for input(s): LEGUR in the last 72 hours. No results for input(s): STREPNEUMAGU in the last 72 hours. No results for input(s): LP1UAG in the last 72 hours.     Original date of  testin2021  Vaccination status: Not vaccinated  Current oxygen delivery system: 11 L high flow nasal cannula  Dexamethasone dosin mg p.o. daily    start date: 2021 increased to 10 mg IV twice daily 2021  Remdesivir Y/N:    None       start date:  Baricitinib Y/N:    Yes        start date 4 mg daily for 14 days started 2021  Tocilizumab Y/N:   None      date given:  Respiratory cultures Y/N:      date/results:  Strep pneumoniae and Legionella Urine Antigen test Y/N: results: None  Antibiotics Y/N with start and stop dates: None  CRP: 27.4> 12.0>21.4  D Dimer: 3495  INR 1.4  Procalcitonin: 1.00> 0.43  Ferritin 171  Sed rate: 80  DVT prophylaxis: Lovenox 1 mg/kg twice daily  GI prophylaxis: Protonix   PT/OT: Ordered  Lung recruitment techniques: incentive spirometer       Assessment:    1. Acute respiratory failure with hypoxia  2. Moderate COVID-19 pneumonia  3. Pulmonary embolism provoked by COVID-19 infection  4. Question of rectus sheath hematoma  5. Diabetes mellitus type 2  6. Lymphopenia related to viral infection  7. Schizophrenia      Plan:   1. Oxygen therapy  10 L high flow cannula wean to keep saturation greater than 88% add humidity to oxygen  2. Dexamethasone increase to 10 mg IV BID 12/1/21 x 5 days then decrease to daily dosing x 5 days, vitamin cocktail  3. Start eliquis 10 mg po bid x 7 days then decrease to 5 mg po bid maintenance dosing. Monitor for increasing rectus sheath hematoma and for anemia. Any signs of bleeding or anemia will need IVC filter. Hgb 9.9  4.  incentive spirometer  5. Trend inflammatory markers crp up to 21.4 , 21  6. cxr 21  7. Incentive spirometer, lung recruitment maneuvers  8. Dvt, gi prophylaxis    This plan of care was reviewed in collaboration with Dr. Steve Sanchez   Electronically signed by TYSON Montoya CNP on 12/3/2021 at 2:39 PM     I personally saw, examined, and cared for the patient. Labs, medications, radiographs reviewed. I agree with history exam and plans detailed in NP note.     Electronically signed by Lesli Estrada MD on 12/3/2021 at 7:03 PM

## 2021-12-03 NOTE — PROGRESS NOTES
[facility]   Progress Note    Admitting Date and Time: 11/28/2021  9:30 PM  Admit Dx: Respiratory failure ([address] [J96.90]  Multiple subsegmental pulmonary emboli without acute cor pulmonale (HCC) [I26.94]  Acute respiratory failure due to COVID-19 (HCC) [U07.1, J96.00]    Subjective:    Patient was admitted with Respiratory failure ([address] [J96.90]  Multiple subsegmental pulmonary emboli without acute cor pulmonale (HCC) [I26.94]  Acute respiratory failure due to COVID-19 ([address] [U07.1, J96.00]. Patient denies fever, chills, cp, n/v.  Pt with some sob and cough     [START ON 12/5/2021] vitamin D  50,000 Units Oral Weekly    apixaban  10 mg Oral BID    Followed by   Kennedy Daniels ON 12/9/2021] apixaban  5 mg Oral BID    dexamethasone  10 mg IntraVENous Q12H    Followed by   Kennedy Daniels ON 12/6/2021] dexamethasone  10 mg IntraVENous Q24H    citalopram  40 mg Oral Daily    ezetimibe  10 mg Oral Daily    fenofibrate  160 mg Oral Daily    ferrous sulfate  325 mg Oral BID    finasteride  5 mg Oral Daily    metoprolol succinate  25 mg Oral Daily    OLANZapine  5 mg Oral Daily    pantoprazole  40 mg Oral QAM AC    risperiDONE  0.25 mg Oral BID    tamsulosin  0.4 mg Oral BID    sodium chloride flush  5-40 mL IntraVENous 2 times per day    ascorbic acid  500 mg Oral Daily    Vitamin D  2,000 Units Oral Daily    zinc sulfate  50 mg Oral Daily    baricitinib  4 mg Oral Daily    insulin lispro  0-6 Units SubCUTAneous TID WC    insulin lispro  0-3 Units SubCUTAneous Nightly     HYDROcodone-acetaminophen, 1 tablet, Q6H PRN  nitroGLYCERIN, 0.4 mg, Q5 Min PRN  sodium chloride flush, 5-40 mL, PRN  sodium chloride, 25 mL, PRN  ondansetron, 4 mg, Q8H PRN   Or  ondansetron, 4 mg, Q6H PRN  polyethylene glycol, 17 g, Daily PRN  acetaminophen, 650 mg, Q6H PRN   Or  acetaminophen, 650 mg, Q6H PRN  glucose, 15 g, PRN  dextrose, 12.5 g, PRN  glucagon (rDNA), 1 mg, PRN  dextrose, 100 mL/hr, PRN Localized airspace opacities in the base of the lower lobes   are also likely due to pneumonia but short-term follow-up is recommended to   exclude the unlikely possibility of neoplasia. Scattered mediastinal lymph nodes favored to be reactive. CT abdomen and pelvis:      Asymmetric thickening of the right rectus abdominus musculature in the   anterior pelvic wall concerning for a rectus sheath hematoma with possible   faint blush of active hemorrhage as detailed above. Other etiologies   including neoplasia thought unlikely. Correlation with clinical presentation   and continued short-term follow-up recommended. Small hiatal hernia. Mild diverticulosis. Other chronic appearing findings in the abdomen and pelvis. Findings were discussed with Dr. Willie Peguero at approximately 0230 hours [place]   time on 11/29/2021. CT ABDOMEN PELVIS W IV CONTRAST Additional Contrast? None   Final Result   CT chest:      Tiny bilateral lower lobe subsegmental pulmonary emboli. No large or central   embolism and no evidence of right heart strain. Bilateral parenchymal infiltrates concerning for multifocal pneumonia,   probably viral.  Localized airspace opacities in the base of the lower lobes   are also likely due to pneumonia but short-term follow-up is recommended to   exclude the unlikely possibility of neoplasia. Scattered mediastinal lymph nodes favored to be reactive. CT abdomen and pelvis:      Asymmetric thickening of the right rectus abdominus musculature in the   anterior pelvic wall concerning for a rectus sheath hematoma with possible   faint blush of active hemorrhage as detailed above. Other etiologies   including neoplasia thought unlikely. Correlation with clinical presentation   and continued short-term follow-up recommended. Small hiatal hernia. Mild diverticulosis. Other chronic appearing findings in the abdomen and pelvis.       Findings were discussed with Dr. Pattie Byrnes at approximately 0230 hours [facility]   time on 11/29/2021. XR CHEST PORTABLE   Final Result   Extensive airspace disease throughout the lungs bilaterally consistent with   multifocal pneumonia. Assessment:    Active Problems:    Respiratory failure ([address]  Resolved Problems:    * No resolved hospital problems. *      Plan:  1. Acute respiratory failure with hypoxia(87%o2sat)POA wean o2 as able  2. Pneumonia due to covid 19 steroids and baricitinib  3. PE anticoagulation per pulmonary. There is a question of rectus sheath hematoma on imaging  4. Hypokalemia monitor and replace prn  5. Elevated lfts monitor  6. Dm type 2 uncontrolled monitor bs and tx with insulin  7. Hyperlipidemia continue meds  8. htn continue med  9. gerd continue med  10.  Depression/schizophrenia continue meds    Chart reviewed and updated by nursing    Time spent is 35 min      Electronically signed by Toñito Duong DO on 12/2/2021 at 8:19 PM

## 2021-12-04 ENCOUNTER — APPOINTMENT (OUTPATIENT)
Dept: GENERAL RADIOLOGY | Age: 71
DRG: 177 | End: 2021-12-04
Payer: MEDICARE

## 2021-12-04 LAB
ALBUMIN SERPL-MCNC: 3.1 G/DL (ref 3.5–5.2)
ALP BLD-CCNC: 37 U/L (ref 40–129)
ALT SERPL-CCNC: 41 U/L (ref 0–40)
ANION GAP SERPL CALCULATED.3IONS-SCNC: 10 MMOL/L (ref 7–16)
AST SERPL-CCNC: 26 U/L (ref 0–39)
BASOPHILS ABSOLUTE: 0 E9/L (ref 0–0.2)
BASOPHILS RELATIVE PERCENT: 0 % (ref 0–2)
BILIRUB SERPL-MCNC: 0.6 MG/DL (ref 0–1.2)
BUN BLDV-MCNC: 24 MG/DL (ref 6–23)
BURR CELLS: ABNORMAL
C-REACTIVE PROTEIN: 4.6 MG/DL (ref 0–0.4)
CALCIUM SERPL-MCNC: 9.3 MG/DL (ref 8.6–10.2)
CHLORIDE BLD-SCNC: 102 MMOL/L (ref 98–107)
CO2: 24 MMOL/L (ref 22–29)
CREAT SERPL-MCNC: 0.6 MG/DL (ref 0.7–1.2)
EOSINOPHILS ABSOLUTE: 0 E9/L (ref 0.05–0.5)
EOSINOPHILS RELATIVE PERCENT: 0 % (ref 0–6)
GFR AFRICAN AMERICAN: >60
GFR NON-AFRICAN AMERICAN: >60 ML/MIN/1.73
GLUCOSE BLD-MCNC: 161 MG/DL (ref 74–99)
HCT VFR BLD CALC: 33.2 % (ref 37–54)
HEMOGLOBIN: 10.9 G/DL (ref 12.5–16.5)
LACTATE DEHYDROGENASE: 395 U/L (ref 135–225)
LYMPHOCYTES ABSOLUTE: 0.08 E9/L (ref 1.5–4)
LYMPHOCYTES RELATIVE PERCENT: 0.9 % (ref 20–42)
MCH RBC QN AUTO: 29.3 PG (ref 26–35)
MCHC RBC AUTO-ENTMCNC: 32.8 % (ref 32–34.5)
MCV RBC AUTO: 89.2 FL (ref 80–99.9)
METER GLUCOSE: 152 MG/DL (ref 74–99)
METER GLUCOSE: 175 MG/DL (ref 74–99)
METER GLUCOSE: 206 MG/DL (ref 74–99)
METER GLUCOSE: 209 MG/DL (ref 74–99)
MONOCYTES ABSOLUTE: 0.15 E9/L (ref 0.1–0.95)
MONOCYTES RELATIVE PERCENT: 1.7 % (ref 2–12)
NEUTROPHILS ABSOLUTE: 7.37 E9/L (ref 1.8–7.3)
NEUTROPHILS RELATIVE PERCENT: 97.4 % (ref 43–80)
NUCLEATED RED BLOOD CELLS: 0 /100 WBC
OVALOCYTES: ABNORMAL
PDW BLD-RTO: 12.7 FL (ref 11.5–15)
PLATELET # BLD: 294 E9/L (ref 130–450)
PMV BLD AUTO: 11.9 FL (ref 7–12)
POIKILOCYTES: ABNORMAL
POLYCHROMASIA: ABNORMAL
POTASSIUM SERPL-SCNC: 4.8 MMOL/L (ref 3.5–5)
RBC # BLD: 3.72 E12/L (ref 3.8–5.8)
SODIUM BLD-SCNC: 136 MMOL/L (ref 132–146)
TOTAL PROTEIN: 6.3 G/DL (ref 6.4–8.3)
WBC # BLD: 7.6 E9/L (ref 4.5–11.5)

## 2021-12-04 PROCEDURE — 80053 COMPREHEN METABOLIC PANEL: CPT

## 2021-12-04 PROCEDURE — 6370000000 HC RX 637 (ALT 250 FOR IP): Performed by: INTERNAL MEDICINE

## 2021-12-04 PROCEDURE — 82962 GLUCOSE BLOOD TEST: CPT

## 2021-12-04 PROCEDURE — 6370000000 HC RX 637 (ALT 250 FOR IP): Performed by: NURSE PRACTITIONER

## 2021-12-04 PROCEDURE — 71045 X-RAY EXAM CHEST 1 VIEW: CPT

## 2021-12-04 PROCEDURE — 83615 LACTATE (LD) (LDH) ENZYME: CPT

## 2021-12-04 PROCEDURE — 2580000003 HC RX 258: Performed by: INTERNAL MEDICINE

## 2021-12-04 PROCEDURE — 6360000002 HC RX W HCPCS: Performed by: NURSE PRACTITIONER

## 2021-12-04 PROCEDURE — 86140 C-REACTIVE PROTEIN: CPT

## 2021-12-04 PROCEDURE — 99232 SBSQ HOSP IP/OBS MODERATE 35: CPT | Performed by: INTERNAL MEDICINE

## 2021-12-04 PROCEDURE — 1200000000 HC SEMI PRIVATE

## 2021-12-04 PROCEDURE — 2700000000 HC OXYGEN THERAPY PER DAY

## 2021-12-04 PROCEDURE — 36415 COLL VENOUS BLD VENIPUNCTURE: CPT

## 2021-12-04 PROCEDURE — 85025 COMPLETE CBC W/AUTO DIFF WBC: CPT

## 2021-12-04 RX ADMIN — Medication 500 MG: at 09:06

## 2021-12-04 RX ADMIN — APIXABAN 10 MG: 5 TABLET, FILM COATED ORAL at 20:54

## 2021-12-04 RX ADMIN — DEXAMETHASONE SODIUM PHOSPHATE 10 MG: 10 INJECTION INTRAMUSCULAR; INTRAVENOUS at 10:10

## 2021-12-04 RX ADMIN — DEXAMETHASONE SODIUM PHOSPHATE 10 MG: 10 INJECTION INTRAMUSCULAR; INTRAVENOUS at 20:54

## 2021-12-04 RX ADMIN — INSULIN LISPRO 2 UNITS: 100 INJECTION, SOLUTION INTRAVENOUS; SUBCUTANEOUS at 20:56

## 2021-12-04 RX ADMIN — FENOFIBRATE 160 MG: 160 TABLET ORAL at 09:05

## 2021-12-04 RX ADMIN — BARICITINIB 4 MG: 2 TABLET, FILM COATED ORAL at 09:05

## 2021-12-04 RX ADMIN — ZINC SULFATE 220 MG (50 MG) CAPSULE 50 MG: CAPSULE at 09:06

## 2021-12-04 RX ADMIN — EZETIMIBE 10 MG: 10 TABLET ORAL at 09:06

## 2021-12-04 RX ADMIN — Medication 10 ML: at 21:00

## 2021-12-04 RX ADMIN — INSULIN LISPRO 4 UNITS: 100 INJECTION, SOLUTION INTRAVENOUS; SUBCUTANEOUS at 17:51

## 2021-12-04 RX ADMIN — CITALOPRAM HYDROBROMIDE 40 MG: 20 TABLET ORAL at 09:05

## 2021-12-04 RX ADMIN — FERROUS SULFATE TAB 325 MG (65 MG ELEMENTAL FE) 325 MG: 325 (65 FE) TAB at 17:51

## 2021-12-04 RX ADMIN — INSULIN LISPRO 2 UNITS: 100 INJECTION, SOLUTION INTRAVENOUS; SUBCUTANEOUS at 09:42

## 2021-12-04 RX ADMIN — RISPERIDONE 0.25 MG: 0.25 TABLET, FILM COATED ORAL at 20:54

## 2021-12-04 RX ADMIN — POTASSIUM & SODIUM PHOSPHATES POWDER PACK 280-160-250 MG 250 MG: 280-160-250 PACK at 09:05

## 2021-12-04 RX ADMIN — FINASTERIDE 5 MG: 5 TABLET, FILM COATED ORAL at 09:06

## 2021-12-04 RX ADMIN — TAMSULOSIN HYDROCHLORIDE 0.4 MG: 0.4 CAPSULE ORAL at 09:06

## 2021-12-04 RX ADMIN — OLANZAPINE 5 MG: 5 TABLET, FILM COATED ORAL at 09:06

## 2021-12-04 RX ADMIN — FERROUS SULFATE TAB 325 MG (65 MG ELEMENTAL FE) 325 MG: 325 (65 FE) TAB at 09:08

## 2021-12-04 RX ADMIN — Medication 10 ML: at 10:00

## 2021-12-04 RX ADMIN — RISPERIDONE 0.25 MG: 0.25 TABLET, FILM COATED ORAL at 09:06

## 2021-12-04 RX ADMIN — METOPROLOL SUCCINATE 25 MG: 25 TABLET, FILM COATED, EXTENDED RELEASE ORAL at 10:11

## 2021-12-04 RX ADMIN — APIXABAN 10 MG: 5 TABLET, FILM COATED ORAL at 09:05

## 2021-12-04 RX ADMIN — INSULIN LISPRO 2 UNITS: 100 INJECTION, SOLUTION INTRAVENOUS; SUBCUTANEOUS at 12:30

## 2021-12-04 RX ADMIN — TAMSULOSIN HYDROCHLORIDE 0.4 MG: 0.4 CAPSULE ORAL at 20:54

## 2021-12-04 RX ADMIN — PANTOPRAZOLE SODIUM 40 MG: 40 TABLET, DELAYED RELEASE ORAL at 06:59

## 2021-12-04 ASSESSMENT — PAIN SCALES - GENERAL
PAINLEVEL_OUTOF10: 0
PAINLEVEL_OUTOF10: 0

## 2021-12-04 NOTE — PROGRESS NOTES
97.5 °F (36.4 °C) (Oral)   Resp 18   Ht 5' 6\" (1.676 m)   Wt 181 lb 6.4 oz (82.3 kg)   SpO2 91%   BMI 29.28 kg/m²   Skin: warm and dry, no rash or erythema  Pulmonary/Chest: clear to auscultation bilaterally- no wheezes, rales or rhonchi, normal air movement, no respiratory distress  Cardiovascular: rhythm reg at rate of 84  Abdomen: soft, non-tender, non-distended, normal bowel sounds, no masses or organomegaly  Extremities: no cyanosis, no clubbing and no edema      Recent Labs     12/03/21  0400      K 4.4      CO2 23   BUN 28*   CREATININE 0.7   GLUCOSE 183*   CALCIUM 8.9       Recent Labs     12/01/21  0335 12/02/21  0418 12/03/21  0400   WBC 6.9 3.5* 7.2   RBC 3.25* 3.53* 3.35*   HGB 9.6* 10.5* 9.9*   HCT 28.3* 31.3* 29.6*   MCV 87.1 88.7 88.4   MCH 29.5 29.7 29.6   MCHC 33.9 33.5 33.4   RDW 12.8 13.0 12.6    231 268   MPV 12.0 12.3* 12.2*       CBC with Differential:    Lab Results   Component Value Date    WBC 7.2 12/03/2021    RBC 3.35 12/03/2021    HGB 9.9 12/03/2021    HCT 29.6 12/03/2021     12/03/2021    MCV 88.4 12/03/2021    MCH 29.6 12/03/2021    MCHC 33.4 12/03/2021    RDW 12.6 12/03/2021    SEGSPCT 78 08/14/2013    LYMPHOPCT 4.7 12/03/2021    MONOPCT 1.8 12/03/2021    EOSPCT 13.3 06/18/2021    BASOPCT 0.0 12/03/2021    MONOSABS 0.13 12/03/2021    LYMPHSABS 0.34 12/03/2021    EOSABS 0.00 12/03/2021    BASOSABS 0.00 12/03/2021     CMP:    Lab Results   Component Value Date     12/03/2021    K 4.4 12/03/2021    K 3.3 11/28/2021     12/03/2021    CO2 23 12/03/2021    BUN 28 12/03/2021    CREATININE 0.7 12/03/2021    GFRAA >60 12/03/2021    LABGLOM >60 12/03/2021    GLUCOSE 183 12/03/2021    GLUCOSE 92 10/09/2010    PROT 6.0 12/03/2021    LABALBU 2.9 12/03/2021    CALCIUM 8.9 12/03/2021    BILITOT 0.5 12/03/2021    ALKPHOS 32 12/03/2021    AST 30 12/03/2021    ALT 44 12/03/2021     Magnesium:    Lab Results   Component Value Date    MG 2.2 12/03/2021 Phosphorus:    Lab Results   Component Value Date    PHOS 2.4 12/03/2021        Radiology:   XR CHEST PORTABLE   Final Result   Stable bilateral pulmonary consolidation, compatible with the history of   COVID-19 pneumonia. CTA PULMONARY W CONTRAST   Final Result   CT chest:      Tiny bilateral lower lobe subsegmental pulmonary emboli. No large or central   embolism and no evidence of right heart strain. Bilateral parenchymal infiltrates concerning for multifocal pneumonia,   probably viral.  Localized airspace opacities in the base of the lower lobes   are also likely due to pneumonia but short-term follow-up is recommended to   exclude the unlikely possibility of neoplasia. Scattered mediastinal lymph nodes favored to be reactive. CT abdomen and pelvis:      Asymmetric thickening of the right rectus abdominus musculature in the   anterior pelvic wall concerning for a rectus sheath hematoma with possible   faint blush of active hemorrhage as detailed above. Other etiologies   including neoplasia thought unlikely. Correlation with clinical presentation   and continued short-term follow-up recommended. Small hiatal hernia. Mild diverticulosis. Other chronic appearing findings in the abdomen and pelvis. Findings were discussed with Dr. Savage Draper at approximately 0230 hours [place]   time on 11/29/2021. CT ABDOMEN PELVIS W IV CONTRAST Additional Contrast? None   Final Result   CT chest:      Tiny bilateral lower lobe subsegmental pulmonary emboli. No large or central   embolism and no evidence of right heart strain. Bilateral parenchymal infiltrates concerning for multifocal pneumonia,   probably viral.  Localized airspace opacities in the base of the lower lobes   are also likely due to pneumonia but short-term follow-up is recommended to   exclude the unlikely possibility of neoplasia. Scattered mediastinal lymph nodes favored to be reactive.       CT abdomen and pelvis:      Asymmetric thickening of the right rectus abdominus musculature in the   anterior pelvic wall concerning for a rectus sheath hematoma with possible   faint blush of active hemorrhage as detailed above. Other etiologies   including neoplasia thought unlikely. Correlation with clinical presentation   and continued short-term follow-up recommended. Small hiatal hernia. Mild diverticulosis. Other chronic appearing findings in the abdomen and pelvis. Findings were discussed with Dr. Medhat Panda at approximately 0230 hours [place]   time on 11/29/2021. XR CHEST PORTABLE   Final Result   Extensive airspace disease throughout the lungs bilaterally consistent with   multifocal pneumonia. XR CHEST PORTABLE    (Results Pending)       Assessment:    Active Problems:    Respiratory failure (HCC)    Pneumonia due to COVID-19 virus    Uncontrolled type 2 diabetes mellitus with hyperglycemia ([facility] [address]  Resolved Problems:    * No resolved hospital problems. *      Plan:  1. Acute respiratory failure with hypoxia(87%o2sat)POA wean o2 as able  2. Pneumonia due to covid 19 steroids and baricitinib  3. PE anticoagulation per pulmonary. There is a question of rectus sheath hematoma on imaging  4. Hypokalemia monitor and replace prn  5. Elevated lfts monitor  6. Dm type 2 uncontrolled monitor bs and tx with insulin  7. Hyperlipidemia continue meds  8. htn continue med  9. gerd continue med  10. Depression/schizophrenia continue meds  11. Hypophosphatemia monitor and replace prn     Pt slowly trending downward with o2 requirements. Encourage activity.           Electronically signed by Vielka Robertson DO on 12/3/2021 at 10:53 PM

## 2021-12-04 NOTE — PROGRESS NOTES
Becki Johnson M.D.,[facility]  Criss Carty D.O., F.A.C.O.I., Odalys Madrid M.D. Michelle Cruz M.D. Shena Andrews D.O. Daily Pulmonary Progress Note    Patient:  Shannon Bergman 66 y.o. male MRN: [id number]     Date of Service: 12/4/2021      Synopsis     We are following patient for respiratory failure with hypoxia, shortness of breath, covid 19 pneumonia, PE    \"CC\" shortness of breath     Code status: FULL       Subjective      Patient seen and examined. Sitting up in bed eating breakfast on 10L HFNC. Discussed care with bedside nurse to keep weaning oxygen as tolerated to keep SpO2 around 90. Patient has no respiratory complaints this am. Denies shortness of breath, chest pain. Admits to using his incentive spirometer. I encouraged him to continue use. He has a dry nonproductive cough. Review of Systems:  Constitutional: Negative for fatigue, chills and fever. HENT: Negative for postnasal drip, rhinorrhea and sore throat. Eyes: Negative for visual disturbance. Respiratory: Positive for cough, denies shortness of breath   Cardiovascular: Negative for chest pain. Gastrointestinal: Positive for diarrhea. Negative for abdominal pain, blood in stool, constipation, nausea and vomiting. Genitourinary: Negative for difficulty urinating, dysuria and urgency. Musculoskeletal: Negative for back pain. Skin: Negative for rash. Neurological:  Negative for dizziness, numbness and headaches.      24-hour events:  None     Objective   Vitals: /64   Pulse 60   Temp 97.4 °F (36.3 °C) (Oral)   Resp 16   Ht 5' 6\" (1.676 m)   Wt 181 lb 6.4 oz (82.3 kg)   SpO2 94%   BMI 29.28 kg/m²     I/O:    Intake/Output Summary (Last 24 hours) at 12/4/2021 1009  Last data filed at 12/4/2021 6258  Gross per 24 hour   Intake --   Output 1500 ml   Net -1500 ml       Vent Information  SpO2: 94 %      CURRENT MEDS :  Scheduled Meds:   [START ON 12/5/2021] vitamin D  50,000 Units Oral Weekly    musculature in the   anterior pelvic wall concerning for a rectus sheath hematoma with possible   faint blush of active hemorrhage as detailed above. Other etiologies   including neoplasia thought unlikely. Correlation with clinical presentation   and continued short-term follow-up recommended. Small hiatal hernia. Mild diverticulosis. Other chronic appearing findings in the abdomen and pelvis. Findings were discussed with Dr. Ame Gunderson at approximately 0230 hours [place]   time on 2021. Labs:  Lab Results   Component Value Date    WBC 7.6 2021    HGB 10.9 2021    HCT 33.2 2021    MCV 89.2 2021    MCH 29.3 2021    MCHC 32.8 2021    RDW 12.7 2021     2021    MPV 11.9 2021     Lab Results   Component Value Date     2021    K 4.8 2021    K 3.3 2021     2021    CO2 24 2021    BUN 24 2021    CREATININE 0.6 2021    LABALBU 3.1 2021    CALCIUM 9.3 2021    GFRAA >60 2021    LABGLOM >60 2021     Lab Results   Component Value Date    PROTIME 15.5 2021    INR 1.4 2021     No results for input(s): PROBNP in the last 72 hours. No results for input(s): PROCAL in the last 72 hours. This SmartLink has not been configured with any valid records.        Micro:  Original date of  testin2021  Vaccination status: Not vaccinated  Current oxygen delivery system: 11 L high flow nasal cannula  Dexamethasone dosin mg p.o. daily    start date: 2021 increased to 10 mg IV twice daily 2021  Remdesivir Y/N:    None       start date:  Baricitinib Y/N:    Yes        start date 4 mg daily for 14 days started 2021  Tocilizumab Y/N:   None      date given:  Respiratory cultures Y/N:      date/results:  Strep pneumoniae and Legionella Urine Antigen test Y/N: results: None  Antibiotics Y/N with start and stop dates: None  CRP: 27.4> 12. 0>21.4-> 4.6  D Dimer: 3495  INR 1.4  Procalcitonin: 1.00> 0.43  Ferritin 171  Sed rate: 80  DVT prophylaxis: Lovenox 1 mg/kg twice daily  GI prophylaxis: Protonix   PT/OT: Ordered  Lung recruitment techniques: incentive spirometer       Assessment:    1. Acute respiratory failure with hypoxia  2. Moderate COVID-19 pneumonia  3. Pulmonary embolism provoked by COVID-19 infection  4. Question of rectus sheath hematoma  5. Diabetes mellitus type 2  6. Lymphopenia related to viral infection  7. Schizophrenia      Plan:   1. Oxygen therapy 10 L high flow cannula wean to keep saturation greater than 88%  2. Dexamethasone 10 mg IV BID 12/1/21 x 5 days then decrease to daily dosing x 5 days, vitamin cocktail  3. Eliquis 10 mg po bid x 7 days then decrease to 5 mg po bid maintenance dosing. Monitor for increasing rectus sheath hematoma and for anemia. Any signs of bleeding or anemia will need IVC filter. Hgb 10.9  4.  incentive spirometer  5. Trend inflammatory markers- much improved  6. CXR pending this am  7. Incentive spirometer, lung recruitment maneuvers  8. Dvt, gi prophylaxis    This plan of care was reviewed in collaboration with Dr. Efren Saavedra   Electronically signed by TYSON Marcelino CNP on 12/4/2021 at 10:09 AM    I personally saw, examined, and cared for the patient. Labs, medications, radiographs reviewed.  I agree with history exam and plans detailed in NP note with the following additions:    Virginie Barker MD

## 2021-12-05 LAB
ALBUMIN SERPL-MCNC: 3.2 G/DL (ref 3.5–5.2)
ALP BLD-CCNC: 38 U/L (ref 40–129)
ALT SERPL-CCNC: 33 U/L (ref 0–40)
ANION GAP SERPL CALCULATED.3IONS-SCNC: 11 MMOL/L (ref 7–16)
AST SERPL-CCNC: 21 U/L (ref 0–39)
BASOPHILS ABSOLUTE: 0 E9/L (ref 0–0.2)
BASOPHILS RELATIVE PERCENT: 0 % (ref 0–2)
BILIRUB SERPL-MCNC: 0.6 MG/DL (ref 0–1.2)
BUN BLDV-MCNC: 24 MG/DL (ref 6–23)
BURR CELLS: ABNORMAL
C-REACTIVE PROTEIN: 9.2 MG/DL (ref 0–0.4)
CALCIUM SERPL-MCNC: 9.3 MG/DL (ref 8.6–10.2)
CHLORIDE BLD-SCNC: 99 MMOL/L (ref 98–107)
CO2: 23 MMOL/L (ref 22–29)
CREAT SERPL-MCNC: 0.6 MG/DL (ref 0.7–1.2)
D DIMER: 2258 NG/ML DDU
EOSINOPHILS ABSOLUTE: 0 E9/L (ref 0.05–0.5)
EOSINOPHILS RELATIVE PERCENT: 0 % (ref 0–6)
GFR AFRICAN AMERICAN: >60
GFR NON-AFRICAN AMERICAN: >60 ML/MIN/1.73
GLUCOSE BLD-MCNC: 147 MG/DL (ref 74–99)
HCT VFR BLD CALC: 31.2 % (ref 37–54)
HEMOGLOBIN: 10.7 G/DL (ref 12.5–16.5)
LYMPHOCYTES ABSOLUTE: 0.29 E9/L (ref 1.5–4)
LYMPHOCYTES RELATIVE PERCENT: 4 % (ref 20–42)
MCH RBC QN AUTO: 30.2 PG (ref 26–35)
MCHC RBC AUTO-ENTMCNC: 34.3 % (ref 32–34.5)
MCV RBC AUTO: 88.1 FL (ref 80–99.9)
METAMYELOCYTES RELATIVE PERCENT: 1 % (ref 0–1)
METER GLUCOSE: 144 MG/DL (ref 74–99)
METER GLUCOSE: 186 MG/DL (ref 74–99)
METER GLUCOSE: 202 MG/DL (ref 74–99)
METER GLUCOSE: 406 MG/DL (ref 74–99)
MONOCYTES ABSOLUTE: 0.14 E9/L (ref 0.1–0.95)
MONOCYTES RELATIVE PERCENT: 2 % (ref 2–12)
MYELOCYTE PERCENT: 1 % (ref 0–0)
NEUTROPHILS ABSOLUTE: 6.77 E9/L (ref 1.8–7.3)
NEUTROPHILS RELATIVE PERCENT: 92 % (ref 43–80)
NUCLEATED RED BLOOD CELLS: 0 /100 WBC
OVALOCYTES: ABNORMAL
PDW BLD-RTO: 12.6 FL (ref 11.5–15)
PLATELET # BLD: 286 E9/L (ref 130–450)
PMV BLD AUTO: 11.9 FL (ref 7–12)
POIKILOCYTES: ABNORMAL
POLYCHROMASIA: ABNORMAL
POTASSIUM SERPL-SCNC: 4.3 MMOL/L (ref 3.5–5)
PROCALCITONIN: 0.1 NG/ML (ref 0–0.08)
RBC # BLD: 3.54 E12/L (ref 3.8–5.8)
SODIUM BLD-SCNC: 133 MMOL/L (ref 132–146)
TOTAL PROTEIN: 6.4 G/DL (ref 6.4–8.3)
WBC # BLD: 7.2 E9/L (ref 4.5–11.5)

## 2021-12-05 PROCEDURE — 6370000000 HC RX 637 (ALT 250 FOR IP): Performed by: INTERNAL MEDICINE

## 2021-12-05 PROCEDURE — 80053 COMPREHEN METABOLIC PANEL: CPT

## 2021-12-05 PROCEDURE — 82962 GLUCOSE BLOOD TEST: CPT

## 2021-12-05 PROCEDURE — 99232 SBSQ HOSP IP/OBS MODERATE 35: CPT | Performed by: INTERNAL MEDICINE

## 2021-12-05 PROCEDURE — 85378 FIBRIN DEGRADE SEMIQUANT: CPT

## 2021-12-05 PROCEDURE — 2580000003 HC RX 258: Performed by: INTERNAL MEDICINE

## 2021-12-05 PROCEDURE — 36415 COLL VENOUS BLD VENIPUNCTURE: CPT

## 2021-12-05 PROCEDURE — 6360000002 HC RX W HCPCS: Performed by: NURSE PRACTITIONER

## 2021-12-05 PROCEDURE — 84145 PROCALCITONIN (PCT): CPT

## 2021-12-05 PROCEDURE — 1200000000 HC SEMI PRIVATE

## 2021-12-05 PROCEDURE — 6370000000 HC RX 637 (ALT 250 FOR IP): Performed by: NURSE PRACTITIONER

## 2021-12-05 PROCEDURE — 2700000000 HC OXYGEN THERAPY PER DAY

## 2021-12-05 PROCEDURE — 85025 COMPLETE CBC W/AUTO DIFF WBC: CPT

## 2021-12-05 RX ADMIN — ERGOCALCIFEROL 50000 UNITS: 1.25 CAPSULE ORAL at 09:49

## 2021-12-05 RX ADMIN — ZINC SULFATE 220 MG (50 MG) CAPSULE 50 MG: CAPSULE at 09:49

## 2021-12-05 RX ADMIN — TAMSULOSIN HYDROCHLORIDE 0.4 MG: 0.4 CAPSULE ORAL at 09:49

## 2021-12-05 RX ADMIN — Medication 10 ML: at 09:50

## 2021-12-05 RX ADMIN — DEXAMETHASONE SODIUM PHOSPHATE 10 MG: 10 INJECTION INTRAMUSCULAR; INTRAVENOUS at 20:58

## 2021-12-05 RX ADMIN — FENOFIBRATE 160 MG: 160 TABLET ORAL at 09:49

## 2021-12-05 RX ADMIN — PANTOPRAZOLE SODIUM 40 MG: 40 TABLET, DELAYED RELEASE ORAL at 05:46

## 2021-12-05 RX ADMIN — CITALOPRAM HYDROBROMIDE 40 MG: 20 TABLET ORAL at 09:48

## 2021-12-05 RX ADMIN — Medication 10 ML: at 21:00

## 2021-12-05 RX ADMIN — TAMSULOSIN HYDROCHLORIDE 0.4 MG: 0.4 CAPSULE ORAL at 20:58

## 2021-12-05 RX ADMIN — METOPROLOL SUCCINATE 25 MG: 25 TABLET, FILM COATED, EXTENDED RELEASE ORAL at 09:49

## 2021-12-05 RX ADMIN — OLANZAPINE 5 MG: 5 TABLET, FILM COATED ORAL at 09:49

## 2021-12-05 RX ADMIN — FINASTERIDE 5 MG: 5 TABLET, FILM COATED ORAL at 09:48

## 2021-12-05 RX ADMIN — INSULIN LISPRO 2 UNITS: 100 INJECTION, SOLUTION INTRAVENOUS; SUBCUTANEOUS at 21:02

## 2021-12-05 RX ADMIN — APIXABAN 10 MG: 5 TABLET, FILM COATED ORAL at 09:48

## 2021-12-05 RX ADMIN — EZETIMIBE 10 MG: 10 TABLET ORAL at 09:49

## 2021-12-05 RX ADMIN — FERROUS SULFATE TAB 325 MG (65 MG ELEMENTAL FE) 325 MG: 325 (65 FE) TAB at 16:38

## 2021-12-05 RX ADMIN — INSULIN LISPRO 12 UNITS: 100 INJECTION, SOLUTION INTRAVENOUS; SUBCUTANEOUS at 12:23

## 2021-12-05 RX ADMIN — Medication 500 MG: at 09:50

## 2021-12-05 RX ADMIN — RISPERIDONE 0.25 MG: 0.25 TABLET, FILM COATED ORAL at 21:00

## 2021-12-05 RX ADMIN — BARICITINIB 4 MG: 2 TABLET, FILM COATED ORAL at 09:50

## 2021-12-05 RX ADMIN — APIXABAN 10 MG: 5 TABLET, FILM COATED ORAL at 20:58

## 2021-12-05 RX ADMIN — RISPERIDONE 0.25 MG: 0.25 TABLET, FILM COATED ORAL at 09:49

## 2021-12-05 RX ADMIN — DEXAMETHASONE SODIUM PHOSPHATE 10 MG: 10 INJECTION INTRAMUSCULAR; INTRAVENOUS at 09:49

## 2021-12-05 RX ADMIN — INSULIN LISPRO 2 UNITS: 100 INJECTION, SOLUTION INTRAVENOUS; SUBCUTANEOUS at 16:37

## 2021-12-05 RX ADMIN — FERROUS SULFATE TAB 325 MG (65 MG ELEMENTAL FE) 325 MG: 325 (65 FE) TAB at 09:49

## 2021-12-05 NOTE — PROGRESS NOTES
Jm Toledo M.D.,[facility]  Anabel Munoz D.O., F.A.C.OWillieI., Toby Zazueta M.D. Ale Leon M.D. Queen Amauri D.O. Daily Pulmonary Progress Note    Patient:  Devona Holter 66 y.o. male MRN: [id number]     Date of Service: 12/5/2021      Synopsis     We are following patient for respiratory failure with hypoxia, shortness of breath, covid 19 pneumonia, PE    \"CC\" shortness of breath     Code status: FULL       Subjective      Patient seen through window. Laying in bed eating breakfast  91% on 7L HFNC without distress. Patient [address] on telephone. Continue to use incentive spirometer. Continue to wean oxygen as tolerated. Review of Systems:  Constitutional: Negative for fatigue, chills and fever. HENT: Negative for postnasal drip, rhinorrhea and sore throat. Eyes: Negative for visual disturbance. Respiratory: Positive for cough, denies shortness of breath   Cardiovascular: Negative for chest pain. Gastrointestinal: Positive for diarrhea. Negative for abdominal pain, blood in stool, constipation, nausea and vomiting. Genitourinary: Negative for difficulty urinating, dysuria and urgency. Musculoskeletal: Negative for back pain. Skin: Negative for rash. Neurological:  Negative for dizziness, numbness and headaches.      24-hour events:  None     Objective   Vitals: BP (!) 100/55   Pulse 85   Temp 98.2 °F (36.8 °C) (Oral)   Resp 18   Ht 5' 6\" (1.676 m)   Wt 181 lb 6.4 oz (82.3 kg)   SpO2 91%   BMI 29.28 kg/m²     I/O:  No intake or output data in the 24 hours ending 12/05/21 1009    Vent Information  SpO2: 91 %      CURRENT MEDS :  Scheduled Meds:   vitamin D  50,000 Units Oral Weekly    apixaban  10 mg Oral BID    Followed by   Soham Jeffries ON 12/9/2021] apixaban  5 mg Oral BID    insulin lispro  0-12 Units SubCUTAneous TID WC    insulin lispro  0-6 Units SubCUTAneous Nightly    dexamethasone  10 mg IntraVENous Q12H    Followed by   Soham Jeffries ON 12/6/2021] rectus sheath hematoma with possible   faint blush of active hemorrhage as detailed above. Other etiologies   including neoplasia thought unlikely. Correlation with clinical presentation   and continued short-term follow-up recommended. Small hiatal hernia. Mild diverticulosis. Other chronic appearing findings in the abdomen and pelvis. Findings were discussed with Dr. Lanny Blizzard at approximately 0230 hours [place]   time on 2021. Labs:  Lab Results   Component Value Date    WBC 7.2 2021    HGB 10.7 2021    HCT 31.2 2021    MCV 88.1 2021    MCH 30.2 2021    MCHC 34.3 2021    RDW 12.6 2021     2021    MPV 11.9 2021     Lab Results   Component Value Date     2021    K 4.3 2021    K 3.3 2021    CL 99 2021    CO2 23 2021    BUN 24 2021    CREATININE 0.6 2021    LABALBU 3.2 2021    CALCIUM 9.3 2021    GFRAA >60 2021    LABGLOM >60 2021     Lab Results   Component Value Date    PROTIME 15.5 2021    INR 1.4 2021     No results for input(s): PROBNP in the last 72 hours. No results for input(s): PROCAL in the last 72 hours. This SmartLink has not been configured with any valid records.        Micro:  Original date of  testin2021  Vaccination status: Not vaccinated  Current oxygen delivery system: 7L NC  Dexamethasone dosin mg p.o. daily start date: 2021 increased to 10 mg IV twice daily 2021  Remdesivir Y/N:  None   Baricitinib Y/N: 4 mg daily for 14 days started 2021  Tocilizumab Y/N:   None   Respiratory cultures Y/N:   Strep pneumoniae and Legionella Urine Antigen test Y/N: results: None  Antibiotics Y/N with start and stop dates: None  CRP: 27.4> 12.0>21.4-> 4.6  D Dimer: 3495-> 2258  INR 1.4  Procalcitonin: 1.00> 0.43  Ferritin 171  Sed rate: 80  DVT prophylaxis: Lovenox 1 mg/kg twice daily  GI prophylaxis: Protonix   PT/OT: Ordered  Lung recruitment techniques: incentive spirometer     Assessment:    1. Acute respiratory failure with hypoxia  2. Moderate COVID-19 pneumonia  3. Pulmonary embolism provoked by COVID-19 infection  4. Question of rectus sheath hematoma  5. Diabetes mellitus type 2  6. Lymphopenia related to viral infection  7. Schizophrenia      Plan:   1. Oxygen therapy 7L high flow cannula wean to keep saturation greater than 88%  2. Dexamethasone 10 mg IV BID 12/1/21 x 5 days then decrease to daily dosing x 5 days, vitamin cocktail  3. Eliquis 10 mg po bid x 7 days then decrease to 5 mg po bid maintenance dosing. Monitor for increasing rectus sheath hematoma and for anemia. Any signs of bleeding or anemia will need IVC filter. Hgb 10.7 today   4. Increase activity as tolerated, edge of bed   5. Trend inflammatory markers  6. CXR with stable infiltrates, no acute process. 7. Incentive spirometer, lung recruitment maneuvers  8. Dvt, gi prophylaxis    This plan of care was reviewed in collaboration with Dr. Pinky Glover   Electronically signed by TYSON Ambrocio - CNP on 12/5/2021 at 10:09 AM     I personally saw, examined, and cared for the patient. Labs, medications, radiographs reviewed. I agree with history exam and plans detailed in NP note.     Shawn Ramey MD

## 2021-12-05 NOTE — PLAN OF CARE
Problem: Airway Clearance - Ineffective  Goal: Achieve or maintain patent airway  Outcome: Ongoing     Problem: Airway Clearance - Ineffective  Goal: Achieve or maintain patent airway  Outcome: Ongoing     Problem: Gas Exchange - Impaired  Goal: Absence of hypoxia  Outcome: Ongoing  Goal: Promote optimal lung function  Outcome: Ongoing     Problem: Gas Exchange - Impaired  Goal: Promote optimal lung function  Outcome: Ongoing     Problem: Breathing Pattern - Ineffective  Goal: Ability to achieve and maintain a regular respiratory rate  Outcome: Ongoing     Problem: Body Temperature -  Risk of, Imbalanced  Goal: Ability to maintain a body temperature within defined limits  Outcome: Ongoing  Goal: Will regain or maintain usual level of consciousness  Outcome: Ongoing  Goal: Complications related to the disease process, condition or treatment will be avoided or minimized  Outcome: Ongoing     Problem: Body Temperature -  Risk of, Imbalanced  Goal: Ability to maintain a body temperature within defined limits  Outcome: Ongoing  Goal: Will regain or maintain usual level of consciousness  Outcome: Ongoing  Goal: Complications related to the disease process, condition or treatment will be avoided or minimized  Outcome: Ongoing     Problem: Breathing Pattern - Ineffective  Goal: Ability to achieve and maintain a regular respiratory rate  Outcome: Ongoing     Problem: Body Temperature -  Risk of, Imbalanced  Goal: Ability to maintain a body temperature within defined limits  Outcome: Ongoing  Goal: Will regain or maintain usual level of consciousness  Outcome: Ongoing  Goal: Complications related to the disease process, condition or treatment will be avoided or minimized  Outcome: Ongoing     Problem:  Body Temperature -  Risk of, Imbalanced  Goal: Will regain or maintain usual level of consciousness  Outcome: Ongoing     Problem: Skin Integrity:  Goal: Will show no infection signs and symptoms  Description: Will show no infection signs and symptoms  Outcome: Ongoing  Goal: Absence of new skin breakdown  Description: Absence of new skin breakdown  Outcome: Ongoing     Problem: Falls - Risk of:  Goal: Will remain free from falls  Description: Will remain free from falls  Outcome: Ongoing  Goal: Absence of physical injury  Description: Absence of physical injury  Outcome: Ongoing     Problem: Falls - Risk of:  Goal: Will remain free from falls  Description: Will remain free from falls  Outcome: Ongoing     Problem: Falls - Risk of:  Goal: Absence of physical injury  Description: Absence of physical injury  Outcome: Ongoing     Problem: Skin Integrity:  Goal: Will show no infection signs and symptoms  Description: Will show no infection signs and symptoms  Outcome: Ongoing  Goal: Absence of new skin breakdown  Description: Absence of new skin breakdown  Outcome: Ongoing     Problem: Skin Integrity:  Goal: Will show no infection signs and symptoms  Description: Will show no infection signs and symptoms  Outcome: Ongoing     Problem: Skin Integrity:  Goal: Absence of new skin breakdown  Description: Absence of new skin breakdown  Outcome: Ongoing     Problem: Patient Education: Go to Patient Education Activity  Goal: Patient/Family Education  Outcome: Ongoing     Problem: Patient Education: Go to Patient Education Activity  Goal: Patient/Family Education  Outcome: Ongoing     Problem: Fatigue  Goal: Verbalize increase energy and improved vitality  Outcome: Ongoing     Problem: Fatigue  Goal: Verbalize increase energy and improved vitality  Outcome: Ongoing

## 2021-12-05 NOTE — PLAN OF CARE
Problem: Fatigue  Goal: Verbalize increase energy and improved vitality  Outcome: Ongoing     Problem: Skin Integrity:  Goal: Will show no infection signs and symptoms  Description: Will show no infection signs and symptoms  Outcome: Ongoing  Goal: Absence of new skin breakdown  Description: Absence of new skin breakdown  Outcome: Ongoing     Problem: Skin Integrity:  Goal: Absence of new skin breakdown  Description: Absence of new skin breakdown  Outcome: Ongoing     Problem: Falls - Risk of:  Goal: Will remain free from falls  Description: Will remain free from falls  Outcome: Ongoing  Goal: Absence of physical injury  Description: Absence of physical injury  Outcome: Ongoing     Problem: Falls - Risk of:  Goal: Will remain free from falls  Description: Will remain free from falls  Outcome: Ongoing     Problem: Falls - Risk of:  Goal: Absence of physical injury  Description: Absence of physical injury  Outcome: Ongoing

## 2021-12-05 NOTE — PROGRESS NOTES
[facility]   Progress Note    Admitting Date and Time: 11/28/2021  9:30 PM  Admit Dx: Respiratory failure ([address] [J96.90]  Multiple subsegmental pulmonary emboli without acute cor pulmonale (HCC) [I26.94]  Acute respiratory failure due to COVID-19 (HCC) [U07.1, J96.00]    Subjective:    Patient was admitted with Respiratory failure ([address] [J96.90]  Multiple subsegmental pulmonary emboli without acute cor pulmonale (HCC) [I26.94]  Acute respiratory failure due to COVID-19 ([address] [U07.1, J96.00]. Patient denies fever, chills, cp, n/v. Pt with some sob but notes some improvement.       [START ON 12/5/2021] vitamin D  50,000 Units Oral Weekly    apixaban  10 mg Oral BID    Followed by   Jovani Olvera ON 12/9/2021] apixaban  5 mg Oral BID    insulin lispro  0-12 Units SubCUTAneous TID WC    insulin lispro  0-6 Units SubCUTAneous Nightly    dexamethasone  10 mg IntraVENous Q12H    Followed by   Jovani Olvera ON 12/6/2021] dexamethasone  10 mg IntraVENous Q24H    citalopram  40 mg Oral Daily    ezetimibe  10 mg Oral Daily    fenofibrate  160 mg Oral Daily    ferrous sulfate  325 mg Oral BID    finasteride  5 mg Oral Daily    metoprolol succinate  25 mg Oral Daily    OLANZapine  5 mg Oral Daily    pantoprazole  40 mg Oral QAM AC    risperiDONE  0.25 mg Oral BID    tamsulosin  0.4 mg Oral BID    sodium chloride flush  5-40 mL IntraVENous 2 times per day    ascorbic acid  500 mg Oral Daily    zinc sulfate  50 mg Oral Daily    baricitinib  4 mg Oral Daily     HYDROcodone-acetaminophen, 1 tablet, Q6H PRN  nitroGLYCERIN, 0.4 mg, Q5 Min PRN  sodium chloride flush, 5-40 mL, PRN  sodium chloride, 25 mL, PRN  ondansetron, 4 mg, Q8H PRN   Or  ondansetron, 4 mg, Q6H PRN  polyethylene glycol, 17 g, Daily PRN  acetaminophen, 650 mg, Q6H PRN   Or  acetaminophen, 650 mg, Q6H PRN  glucose, 15 g, PRN  dextrose, 12.5 g, PRN  glucagon (rDNA), 1 mg, PRN  dextrose, 100 mL/hr, PRN         Objective:    BP 125/68   Pulse 60   Temp 97.4 °F (36.3 °C) (Oral)   Resp 16   Ht 5' 6\" (1.676 m)   Wt 181 lb 6.4 oz (82.3 kg)   SpO2 94%   BMI 29.28 kg/m²   Skin: warm and dry, no rash or erythema  Pulmonary/Chest: clear to auscultation bilaterally- no wheezes, rales or rhonchi, normal air movement, no respiratory distress  Cardiovascular: rhythm reg at rate of 64  Abdomen: soft, non-tender, non-distended, normal bowel sounds, no masses or organomegaly  Extremities: no cyanosis, no clubbing and no edema      Recent Labs     12/03/21  0400 12/04/21  0600    136   K 4.4 4.8    102   CO2 23 24   BUN 28* 24*   CREATININE 0.7 0.6*   GLUCOSE 183* 161*   CALCIUM 8.9 9.3       Recent Labs     12/02/21  0418 12/03/21  0400 12/04/21  0600   WBC 3.5* 7.2 7.6   RBC 3.53* 3.35* 3.72*   HGB 10.5* 9.9* 10.9*   HCT 31.3* 29.6* 33.2*   MCV 88.7 88.4 89.2   MCH 29.7 29.6 29.3   MCHC 33.5 33.4 32.8   RDW 13.0 12.6 12.7    268 294   MPV 12.3* 12.2* 11.9       CBC with Differential:    Lab Results   Component Value Date    WBC 7.6 12/04/2021    RBC 3.72 12/04/2021    HGB 10.9 12/04/2021    HCT 33.2 12/04/2021     12/04/2021    MCV 89.2 12/04/2021    MCH 29.3 12/04/2021    MCHC 32.8 12/04/2021    RDW 12.7 12/04/2021    NRBC 0.0 12/04/2021    SEGSPCT 78 08/14/2013    LYMPHOPCT 0.9 12/04/2021    MONOPCT 1.7 12/04/2021    EOSPCT 13.3 06/18/2021    BASOPCT 0.0 12/04/2021    MONOSABS 0.15 12/04/2021    LYMPHSABS 0.08 12/04/2021    EOSABS 0.00 12/04/2021    BASOSABS 0.00 12/04/2021     CMP:    Lab Results   Component Value Date     12/04/2021    K 4.8 12/04/2021    K 3.3 11/28/2021     12/04/2021    CO2 24 12/04/2021    BUN 24 12/04/2021    CREATININE 0.6 12/04/2021    GFRAA >60 12/04/2021    LABGLOM >60 12/04/2021    GLUCOSE 161 12/04/2021    GLUCOSE 92 10/09/2010    PROT 6.3 12/04/2021    LABALBU 3.1 12/04/2021    CALCIUM 9.3 12/04/2021    BILITOT 0.6 12/04/2021    ALKPHOS 37 12/04/2021    AST 26 12/04/2021 ALT 41 12/04/2021        Radiology:   XR CHEST PORTABLE   Final Result   Stable bilateral pulmonary infiltrates. XR CHEST PORTABLE   Final Result   Stable bilateral pulmonary consolidation, compatible with the history of   COVID-19 pneumonia. CTA PULMONARY W CONTRAST   Final Result   CT chest:      Tiny bilateral lower lobe subsegmental pulmonary emboli. No large or central   embolism and no evidence of right heart strain. Bilateral parenchymal infiltrates concerning for multifocal pneumonia,   probably viral.  Localized airspace opacities in the base of the lower lobes   are also likely due to pneumonia but short-term follow-up is recommended to   exclude the unlikely possibility of neoplasia. Scattered mediastinal lymph nodes favored to be reactive. CT abdomen and pelvis:      Asymmetric thickening of the right rectus abdominus musculature in the   anterior pelvic wall concerning for a rectus sheath hematoma with possible   faint blush of active hemorrhage as detailed above. Other etiologies   including neoplasia thought unlikely. Correlation with clinical presentation   and continued short-term follow-up recommended. Small hiatal hernia. Mild diverticulosis. Other chronic appearing findings in the abdomen and pelvis. Findings were discussed with Dr. Cherise Duarte at approximately 0230 hours [place]   time on 11/29/2021. CT ABDOMEN PELVIS W IV CONTRAST Additional Contrast? None   Final Result   CT chest:      Tiny bilateral lower lobe subsegmental pulmonary emboli. No large or central   embolism and no evidence of right heart strain. Bilateral parenchymal infiltrates concerning for multifocal pneumonia,   probably viral.  Localized airspace opacities in the base of the lower lobes   are also likely due to pneumonia but short-term follow-up is recommended to   exclude the unlikely possibility of neoplasia.       Scattered mediastinal lymph nodes favored to be reactive. CT abdomen and pelvis:      Asymmetric thickening of the right rectus abdominus musculature in the   anterior pelvic wall concerning for a rectus sheath hematoma with possible   faint blush of active hemorrhage as detailed above. Other etiologies   including neoplasia thought unlikely. Correlation with clinical presentation   and continued short-term follow-up recommended. Small hiatal hernia. Mild diverticulosis. Other chronic appearing findings in the abdomen and pelvis. Findings were discussed with Dr. Lanny Blizzard at approximately 0230 hours [place]   time on 11/29/2021. XR CHEST PORTABLE   Final Result   Extensive airspace disease throughout the lungs bilaterally consistent with   multifocal pneumonia. Assessment:    Active Problems:    Respiratory failure (HCC)    Pneumonia due to COVID-19 virus    Uncontrolled type 2 diabetes mellitus with hyperglycemia ([facility] [address]  Resolved Problems:    * No resolved hospital problems. *      Plan:  1. Acute respiratory failure with hypoxia(87%o2sat)POA wean o2 as able  2. Pneumonia due to covid 19 steroids and baricitinib  3. PE anticoagulation per pulmonary. There is a question of rectus sheath hematoma on imaging  4. Hypokalemia monitor and replace prn  5. Elevated lfts monitor  6. Dm type 2 uncontrolled monitor bs and tx with insulin  7. Hyperlipidemia continue meds  8. htn continue med  9. gerd continue med  10. Depression/schizophrenia continue meds  11. Hypophosphatemia monitor and replace prn     Pt using IS. Increase activity as able.  Continue to encourage IS         Electronically signed by Fernando Duong DO on 12/4/2021 at 7:15 PM

## 2021-12-06 LAB
ALBUMIN SERPL-MCNC: 3.2 G/DL (ref 3.5–5.2)
ALP BLD-CCNC: 51 U/L (ref 40–129)
ALT SERPL-CCNC: 29 U/L (ref 0–40)
ANION GAP SERPL CALCULATED.3IONS-SCNC: 11 MMOL/L (ref 7–16)
AST SERPL-CCNC: 18 U/L (ref 0–39)
BASOPHILS ABSOLUTE: 0.01 E9/L (ref 0–0.2)
BASOPHILS RELATIVE PERCENT: 0.1 % (ref 0–2)
BILIRUB SERPL-MCNC: 0.6 MG/DL (ref 0–1.2)
BUN BLDV-MCNC: 30 MG/DL (ref 6–23)
C-REACTIVE PROTEIN: 4.4 MG/DL (ref 0–0.4)
CALCIUM SERPL-MCNC: 9.3 MG/DL (ref 8.6–10.2)
CHLORIDE BLD-SCNC: 102 MMOL/L (ref 98–107)
CO2: 24 MMOL/L (ref 22–29)
CREAT SERPL-MCNC: 0.7 MG/DL (ref 0.7–1.2)
EOSINOPHILS ABSOLUTE: 0.11 E9/L (ref 0.05–0.5)
EOSINOPHILS RELATIVE PERCENT: 1.2 % (ref 0–6)
GFR AFRICAN AMERICAN: >60
GFR NON-AFRICAN AMERICAN: >60 ML/MIN/1.73
GLUCOSE BLD-MCNC: 100 MG/DL (ref 74–99)
HCT VFR BLD CALC: 31.8 % (ref 37–54)
HEMOGLOBIN: 10.8 G/DL (ref 12.5–16.5)
IMMATURE GRANULOCYTES #: 0.23 E9/L
IMMATURE GRANULOCYTES %: 2.6 % (ref 0–5)
LYMPHOCYTES ABSOLUTE: 0.75 E9/L (ref 1.5–4)
LYMPHOCYTES RELATIVE PERCENT: 8.3 % (ref 20–42)
MCH RBC QN AUTO: 29.9 PG (ref 26–35)
MCHC RBC AUTO-ENTMCNC: 34 % (ref 32–34.5)
MCV RBC AUTO: 88.1 FL (ref 80–99.9)
METER GLUCOSE: 209 MG/DL (ref 74–99)
METER GLUCOSE: 267 MG/DL (ref 74–99)
METER GLUCOSE: 283 MG/DL (ref 74–99)
METER GLUCOSE: 91 MG/DL (ref 74–99)
MONOCYTES ABSOLUTE: 0.24 E9/L (ref 0.1–0.95)
MONOCYTES RELATIVE PERCENT: 2.7 % (ref 2–12)
NEUTROPHILS ABSOLUTE: 7.65 E9/L (ref 1.8–7.3)
NEUTROPHILS RELATIVE PERCENT: 85.1 % (ref 43–80)
PDW BLD-RTO: 12.9 FL (ref 11.5–15)
PLATELET # BLD: 311 E9/L (ref 130–450)
PMV BLD AUTO: 11.5 FL (ref 7–12)
POTASSIUM SERPL-SCNC: 4.4 MMOL/L (ref 3.5–5)
RBC # BLD: 3.61 E12/L (ref 3.8–5.8)
SODIUM BLD-SCNC: 137 MMOL/L (ref 132–146)
TOTAL PROTEIN: 6.3 G/DL (ref 6.4–8.3)
WBC # BLD: 9 E9/L (ref 4.5–11.5)

## 2021-12-06 PROCEDURE — 86140 C-REACTIVE PROTEIN: CPT

## 2021-12-06 PROCEDURE — 36415 COLL VENOUS BLD VENIPUNCTURE: CPT

## 2021-12-06 PROCEDURE — 82962 GLUCOSE BLOOD TEST: CPT

## 2021-12-06 PROCEDURE — 2700000000 HC OXYGEN THERAPY PER DAY

## 2021-12-06 PROCEDURE — 1200000000 HC SEMI PRIVATE

## 2021-12-06 PROCEDURE — 2580000003 HC RX 258: Performed by: INTERNAL MEDICINE

## 2021-12-06 PROCEDURE — 80053 COMPREHEN METABOLIC PANEL: CPT

## 2021-12-06 PROCEDURE — 85025 COMPLETE CBC W/AUTO DIFF WBC: CPT

## 2021-12-06 PROCEDURE — 6370000000 HC RX 637 (ALT 250 FOR IP): Performed by: INTERNAL MEDICINE

## 2021-12-06 PROCEDURE — 6370000000 HC RX 637 (ALT 250 FOR IP): Performed by: NURSE PRACTITIONER

## 2021-12-06 PROCEDURE — 6360000002 HC RX W HCPCS: Performed by: NURSE PRACTITIONER

## 2021-12-06 RX ADMIN — INSULIN LISPRO 4 UNITS: 100 INJECTION, SOLUTION INTRAVENOUS; SUBCUTANEOUS at 16:59

## 2021-12-06 RX ADMIN — RISPERIDONE 0.25 MG: 0.25 TABLET, FILM COATED ORAL at 08:44

## 2021-12-06 RX ADMIN — APIXABAN 10 MG: 5 TABLET, FILM COATED ORAL at 20:30

## 2021-12-06 RX ADMIN — FERROUS SULFATE TAB 325 MG (65 MG ELEMENTAL FE) 325 MG: 325 (65 FE) TAB at 16:14

## 2021-12-06 RX ADMIN — TAMSULOSIN HYDROCHLORIDE 0.4 MG: 0.4 CAPSULE ORAL at 20:24

## 2021-12-06 RX ADMIN — Medication 10 ML: at 20:25

## 2021-12-06 RX ADMIN — METOPROLOL SUCCINATE 25 MG: 25 TABLET, FILM COATED, EXTENDED RELEASE ORAL at 08:44

## 2021-12-06 RX ADMIN — EZETIMIBE 10 MG: 10 TABLET ORAL at 08:45

## 2021-12-06 RX ADMIN — INSULIN LISPRO 3 UNITS: 100 INJECTION, SOLUTION INTRAVENOUS; SUBCUTANEOUS at 20:57

## 2021-12-06 RX ADMIN — DEXAMETHASONE SODIUM PHOSPHATE 10 MG: 10 INJECTION INTRAMUSCULAR; INTRAVENOUS at 11:12

## 2021-12-06 RX ADMIN — TAMSULOSIN HYDROCHLORIDE 0.4 MG: 0.4 CAPSULE ORAL at 08:46

## 2021-12-06 RX ADMIN — INSULIN LISPRO 6 UNITS: 100 INJECTION, SOLUTION INTRAVENOUS; SUBCUTANEOUS at 12:00

## 2021-12-06 RX ADMIN — BARICITINIB 4 MG: 2 TABLET, FILM COATED ORAL at 08:45

## 2021-12-06 RX ADMIN — PANTOPRAZOLE SODIUM 40 MG: 40 TABLET, DELAYED RELEASE ORAL at 04:59

## 2021-12-06 RX ADMIN — FINASTERIDE 5 MG: 5 TABLET, FILM COATED ORAL at 08:45

## 2021-12-06 RX ADMIN — FENOFIBRATE 160 MG: 160 TABLET ORAL at 08:44

## 2021-12-06 RX ADMIN — FERROUS SULFATE TAB 325 MG (65 MG ELEMENTAL FE) 325 MG: 325 (65 FE) TAB at 08:44

## 2021-12-06 RX ADMIN — RISPERIDONE 0.25 MG: 0.25 TABLET, FILM COATED ORAL at 20:24

## 2021-12-06 RX ADMIN — OLANZAPINE 5 MG: 5 TABLET, FILM COATED ORAL at 08:45

## 2021-12-06 RX ADMIN — ZINC SULFATE 220 MG (50 MG) CAPSULE 50 MG: CAPSULE at 08:46

## 2021-12-06 RX ADMIN — APIXABAN 10 MG: 5 TABLET, FILM COATED ORAL at 08:45

## 2021-12-06 RX ADMIN — Medication 10 ML: at 08:46

## 2021-12-06 RX ADMIN — Medication 500 MG: at 08:46

## 2021-12-06 RX ADMIN — CITALOPRAM HYDROBROMIDE 40 MG: 20 TABLET ORAL at 08:46

## 2021-12-06 ASSESSMENT — PAIN SCALES - GENERAL
PAINLEVEL_OUTOF10: 0
PAINLEVEL_OUTOF10: 0

## 2021-12-06 NOTE — PROGRESS NOTES
Admit Date: 11/28/2021    Subjective: All event chart reviewed   Awake comfortable  On 7 L NC    Objective:     No data found. I/O last 3 completed shifts:  In: -   Out: 700 [Urine:700]  No intake/output data recorded. HEENT: Normal  NECK: Thyroid normal. No carotid bruit. No lymphphadenopathy. CVS: RRR  RS: Clear. No wheeze. No rhonchi. ABD: Soft. Non tender. No mass. Normal BS. EXT: No edema. Non tender. Pulses present.    NEURO:no focal deficit       Scheduled Meds:   vitamin D  50,000 Units Oral Weekly    apixaban  10 mg Oral BID    Followed by   Nabeel Klein ON 12/9/2021] apixaban  5 mg Oral BID    insulin lispro  0-12 Units SubCUTAneous TID WC    insulin lispro  0-6 Units SubCUTAneous Nightly    dexamethasone  10 mg IntraVENous Q24H    citalopram  40 mg Oral Daily    ezetimibe  10 mg Oral Daily    fenofibrate  160 mg Oral Daily    ferrous sulfate  325 mg Oral BID    finasteride  5 mg Oral Daily    metoprolol succinate  25 mg Oral Daily    OLANZapine  5 mg Oral Daily    pantoprazole  40 mg Oral QAM AC    risperiDONE  0.25 mg Oral BID    tamsulosin  0.4 mg Oral BID    sodium chloride flush  5-40 mL IntraVENous 2 times per day    ascorbic acid  500 mg Oral Daily    zinc sulfate  50 mg Oral Daily    baricitinib  4 mg Oral Daily     Continuous Infusions:   sodium chloride      dextrose         CBC with Differential:    Lab Results   Component Value Date    WBC 9.0 12/06/2021    RBC 3.61 12/06/2021    HGB 10.8 12/06/2021    HCT 31.8 12/06/2021     12/06/2021    MCV 88.1 12/06/2021    MCH 29.9 12/06/2021    MCHC 34.0 12/06/2021    RDW 12.9 12/06/2021    NRBC 0.0 12/05/2021    SEGSPCT 78 08/14/2013    METASPCT 1.0 12/05/2021    LYMPHOPCT 8.3 12/06/2021    MONOPCT 2.7 12/06/2021    MYELOPCT 1.0 12/05/2021    EOSPCT 13.3 06/18/2021    BASOPCT 0.1 12/06/2021    MONOSABS 0.24 12/06/2021    LYMPHSABS 0.75 12/06/2021    EOSABS 0.11 12/06/2021    BASOSABS 0.01 12/06/2021     CMP:    Lab Results   Component Value Date     12/06/2021    K 4.4 12/06/2021    K 3.3 11/28/2021     12/06/2021    CO2 24 12/06/2021    BUN 30 12/06/2021    CREATININE 0.7 12/06/2021    GFRAA >60 12/06/2021    LABGLOM >60 12/06/2021    PROT 6.3 12/06/2021    LABALBU 3.2 12/06/2021    CALCIUM 9.3 12/06/2021    BILITOT 0.6 12/06/2021    ALKPHOS 51 12/06/2021    AST 18 12/06/2021    ALT 29 12/06/2021     PT/INR:    Lab Results   Component Value Date    PROTIME 15.5 11/29/2021    INR 1.4 11/29/2021       Assessment:     Active Problems:    Respiratory failure (HCC) hypoxic due to     Pneumonia due to COVID-19 virus    PE     Type 2 diabetes mellitus       Plan:   Continue same management wean O2 as able

## 2021-12-06 NOTE — PROGRESS NOTES
[facility] Hospitalist   Progress Note    Admitting Date and Time: 11/28/2021  9:30 PM  Admit Dx: Respiratory failure ([facility] [address] [J96.90]  Multiple subsegmental pulmonary emboli without acute cor pulmonale (HCC) [I26.94]  Acute respiratory failure due to COVID-19 (HCC) [U07.1, J96.00]    Subjective:    Patient was admitted with Respiratory failure ([facility] [address] [J96.90]  Multiple subsegmental pulmonary emboli without acute cor pulmonale (HCC) [I26.94]  Acute respiratory failure due to COVID-19 ([facility] [address] [U07.1, J96.00].  Patient denies fever, chills, cp, sob, n/v. Pt using IS     vitamin D  50,000 Units Oral Weekly    apixaban  10 mg Oral BID    Followed by   Jamey Arizmendi ON 12/9/2021] apixaban  5 mg Oral BID    insulin lispro  0-12 Units SubCUTAneous TID WC    insulin lispro  0-6 Units SubCUTAneous Nightly    [START ON 12/6/2021] dexamethasone  10 mg IntraVENous Q24H    citalopram  40 mg Oral Daily    ezetimibe  10 mg Oral Daily    fenofibrate  160 mg Oral Daily    ferrous sulfate  325 mg Oral BID    finasteride  5 mg Oral Daily    metoprolol succinate  25 mg Oral Daily    OLANZapine  5 mg Oral Daily    pantoprazole  40 mg Oral QAM AC    risperiDONE  0.25 mg Oral BID    tamsulosin  0.4 mg Oral BID    sodium chloride flush  5-40 mL IntraVENous 2 times per day    ascorbic acid  500 mg Oral Daily    zinc sulfate  50 mg Oral Daily    baricitinib  4 mg Oral Daily     HYDROcodone-acetaminophen, 1 tablet, Q6H PRN  nitroGLYCERIN, 0.4 mg, Q5 Min PRN  sodium chloride flush, 5-40 mL, PRN  sodium chloride, 25 mL, PRN  ondansetron, 4 mg, Q8H PRN   Or  ondansetron, 4 mg, Q6H PRN  polyethylene glycol, 17 g, Daily PRN  acetaminophen, 650 mg, Q6H PRN   Or  acetaminophen, 650 mg, Q6H PRN  glucose, 15 g, PRN  dextrose, 12.5 g, PRN  glucagon (rDNA), 1 mg, PRN  dextrose, 100 mL/hr, PRN         Objective:    BP (!) 100/55   Pulse 85   Temp 98.2 °F (36.8 °C) (Oral)   Resp 18   Ht 5' 6\" (1.676 m)   Wt 181 lb 6.4 oz (82.3 kg)   SpO2 91%   BMI 29.28 kg/m²   Skin: warm and dry, no rash or erythema  Pulmonary/Chest: clear to auscultation bilaterally- no wheezes, rales or rhonchi, normal air movement, no respiratory distress  Cardiovascular: rhythm reg at rate of 84  Abdomen: soft, non-tender, non-distended, normal bowel sounds, no masses or organomegaly  Extremities: no cyanosis, no clubbing and no edema      Recent Labs     12/03/21  0400 12/04/21  0600 12/05/21  0500    136 133   K 4.4 4.8 4.3    102 99   CO2 23 24 23   BUN 28* 24* 24*   CREATININE 0.7 0.6* 0.6*   GLUCOSE 183* 161* 147*   CALCIUM 8.9 9.3 9.3       Recent Labs     12/03/21  0400 12/04/21  0600 12/05/21  0500   WBC 7.2 7.6 7.2   RBC 3.35* 3.72* 3.54*   HGB 9.9* 10.9* 10.7*   HCT 29.6* 33.2* 31.2*   MCV 88.4 89.2 88.1   MCH 29.6 29.3 30.2   MCHC 33.4 32.8 34.3   RDW 12.6 12.7 12.6    294 286   MPV 12.2* 11.9 11.9       CBC with Differential:    Lab Results   Component Value Date    WBC 7.2 12/05/2021    RBC 3.54 12/05/2021    HGB 10.7 12/05/2021    HCT 31.2 12/05/2021     12/05/2021    MCV 88.1 12/05/2021    MCH 30.2 12/05/2021    MCHC 34.3 12/05/2021    RDW 12.6 12/05/2021    NRBC 0.0 12/05/2021    SEGSPCT 78 08/14/2013    METASPCT 1.0 12/05/2021    LYMPHOPCT 4.0 12/05/2021    MONOPCT 2.0 12/05/2021    MYELOPCT 1.0 12/05/2021    EOSPCT 13.3 06/18/2021    BASOPCT 0.0 12/05/2021    MONOSABS 0.14 12/05/2021    LYMPHSABS 0.29 12/05/2021    EOSABS 0.00 12/05/2021    BASOSABS 0.00 12/05/2021     CMP:    Lab Results   Component Value Date     12/05/2021    K 4.3 12/05/2021    K 3.3 11/28/2021    CL 99 12/05/2021    CO2 23 12/05/2021    BUN 24 12/05/2021    CREATININE 0.6 12/05/2021    GFRAA >60 12/05/2021    LABGLOM >60 12/05/2021    GLUCOSE 147 12/05/2021    GLUCOSE 92 10/09/2010    PROT 6.4 12/05/2021    LABALBU 3.2 12/05/2021    CALCIUM 9.3 12/05/2021    BILITOT 0.6 12/05/2021    ALKPHOS 38 12/05/2021    AST 21 12/05/2021    ALT 33 reactive. CT abdomen and pelvis:      Asymmetric thickening of the right rectus abdominus musculature in the   anterior pelvic wall concerning for a rectus sheath hematoma with possible   faint blush of active hemorrhage as detailed above. Other etiologies   including neoplasia thought unlikely. Correlation with clinical presentation   and continued short-term follow-up recommended. Small hiatal hernia. Mild diverticulosis. Other chronic appearing findings in the abdomen and pelvis. Findings were discussed with Dr. Sher Kaufman at approximately 0230 hours [place]   time on 11/29/2021. XR CHEST PORTABLE   Final Result   Extensive airspace disease throughout the lungs bilaterally consistent with   multifocal pneumonia. Assessment:    Active Problems:    Respiratory failure (HCC)    Pneumonia due to COVID-19 virus    Uncontrolled type 2 diabetes mellitus with hyperglycemia ([address]    Controlled type 2 diabetes mellitus without complication ([facility] [address]  Resolved Problems:    * No resolved hospital problems. *      Plan:  1. Acute respiratory failure with hypoxia(87%o2sat)POA wean o2 as able. Pt using his IS. 2. Pneumonia due to covid 19 steroids and baricitinib  3. PE anticoagulation per pulmonary. There is a question of rectus sheath hematoma on imaging  4. Hypokalemia monitor and replace prn  5. Elevated lfts monitor  6. Dm type 2 uncontrolled monitor bs and tx with insulin  7. Hyperlipidemia continue meds  8. htn continue med  9. gerd continue med  10. Depression/schizophrenia continue meds  11. Hypophosphatemia monitor and replace prn     Asked nursing staff to get pt out of bed to chair today.    PT/OT consult        Electronically signed by Chrissy Lopez DO on 12/5/2021 at 10:37 PM

## 2021-12-06 NOTE — CARE COORDINATION
Social Work / Discharge Planning:    COVID positive 11/22/21. Chart reviewed. Patient's pulse ox this morning was 77% on 8L. Patient's oxygen saturation slowly began to improve when placed on 15L high flow. Patient currently 90% on 15 L high flow. Nursing staff will continue to monitor closely and wean as tolerated. Plan at discharge is [facility]. Await precert. MARV in Epic and transportation form in chart. HENS completed. Social work will continue to follow.  Electronically signed by ZUHAIR Abdullahi on 12/6/21 at 10:01 AM EST

## 2021-12-06 NOTE — PROGRESS NOTES
Comprehensive Nutrition Assessment    Type and Reason for Visit:  Initial, RD Nutrition Re-Screen/LOS    Nutrition Recommendations/Plan: Continue Current Diet/ONS. Nutrition Assessment:  Pt adm w/ worsening SOB, cough, weakness x ~4-5d pta. PMHx DM, CAD, Schizo, intellectual delay, recent adm 2/2 S.I.; adm w/ COVID19+ PNA. At risk d/t decreased intake since adm d/t poor appetite w/ SOB 2/2 COVID19+ PNA. Will continue ONS and monitor. Malnutrition Assessment:  Malnutrition Status: At risk for malnutrition (Comment)    Context:  Acute Illness     Findings of the 6 clinical characteristics of malnutrition:  Energy Intake:  1 - 75% or less of estimated energy requirements for 7 or more days  Weight Loss:  No significant weight loss     Body Fat Loss:  Unable to assess (2/2 COVID iso)     Muscle Mass Loss:  Unable to assess    Fluid Accumulation:  No significant fluid accumulation     Strength:  Not Performed    Estimated Daily Nutrient Needs:  Energy (kcal):  2406-8758; Weight Used for Energy Requirements:  Current     Protein (g):  1.3-1.5= ; Weight Used for Protein Requirements:  Ideal        Fluid (ml/day):  3441-8140; Method Used for Fluid Requirements:  1 ml/kcal      Nutrition Related Findings:  A&O, poor dentition, Abd/BS WDL, no edema, -I/O's      Wounds:  None       Current Nutrition Therapies:    ADULT DIET; Regular  ADULT ORAL NUTRITION SUPPLEMENT; Breakfast, Dinner; Diabetic Oral Supplement    Anthropometric Measures:  · Height: 5' 6\" (167.6 cm)  · Current Body Weight: 181 lb (82.1 kg) (actual 11/28)   · Admission Body Weight: 181 lb (82.1 kg) (actual 11/28)    · Usual Body Weight: 183 lb (83 kg) (actual 12/21/20 per EMR)     · Ideal Body Weight: 142 lbs; % Ideal Body Weight     · BMI: 29.2  · Adjusted Body Weight:  ; No Adjustment   · Adjusted BMI:      · BMI Categories: Overweight (BMI 25.0-29. 9)       Nutrition Diagnosis:   · Inadequate oral intake related to impaired respiratory function (2/2 COVID19+ PNA) as evidenced by intake 51-75%, poor intake prior to admission, diarrhea      Nutrition Interventions:   Food and/or Nutrient Delivery:  Continue Current Diet, Continue Oral Nutrition Supplement (Continue Current Diet/ONS.)  Nutrition Education/Counseling:  Education not indicated   Coordination of Nutrition Care:  Continue to monitor while inpatient    Goals:  PO intake >75% of meals/ONS. Nutrition Monitoring and Evaluation:   Behavioral-Environmental Outcomes:  None Identified   Food/Nutrient Intake Outcomes:  Food and Nutrient Intake, Supplement Intake  Physical Signs/Symptoms Outcomes:  Biochemical Data, Chewing or Swallowing, Diarrhea, GI Status, Fluid Status or Edema, Nutrition Focused Physical Findings, Skin, Weight     Discharge Planning:     Too soon to determine     Electronically signed by Kenan Varela RD, LD on 12/6/21 at 1:23 PM EST    Contact: ext [phone]

## 2021-12-06 NOTE — PROGRESS NOTES
Long Lieberman M.D.,[facility]  Booker Malagon D.O., F.A.C.OWillieI., Jackson Salomon M.D. Fabiana Aguilar M.D. Germain Rodriguez D.O. Daily Pulmonary Progress Note    Patient:  Kyle Tobin 66 y.o. male MRN: [id number]     Date of Service: 12/6/2021      Synopsis     We are following patient for respiratory failure with hypoxia, shortness of breath, covid 19 pneumonia, PE    \"CC\" shortness of breath     Code status: FULL       Subjective      Patient seen through window. Laying in bed in no apparent distress. Had been on 7L oxygen, required increase to 15L today. Sat decreased to 77% on 7L. Review of Systems:  Constitutional: Negative for fatigue, chills and fever. HENT: Negative for postnasal drip, rhinorrhea and sore throat. Eyes: Negative for visual disturbance. Respiratory: Positive for cough, denies shortness of breath   Cardiovascular: Negative for chest pain. Gastrointestinal: Positive for diarrhea. Negative for abdominal pain, blood in stool, constipation, nausea and vomiting. Genitourinary: Negative for difficulty urinating, dysuria and urgency. Musculoskeletal: Negative for back pain. Skin: Negative for rash. Neurological:  Negative for dizziness, numbness and headaches.      24-hour events:  None     Objective   Vitals: BP (!) 98/53   Pulse 85   Temp 97.9 °F (36.6 °C) (Oral)   Resp 20   Ht 5' 6\" (1.676 m)   Wt 181 lb 6.4 oz (82.3 kg)   SpO2 91%   BMI 29.28 kg/m²     I/O:    Intake/Output Summary (Last 24 hours) at 12/6/2021 1358  Last data filed at 12/6/2021 1105  Gross per 24 hour   Intake --   Output 1500 ml   Net -1500 ml       Vent Information  SpO2: 91 %      CURRENT MEDS :  Scheduled Meds:   vitamin D  50,000 Units Oral Weekly    apixaban  10 mg Oral BID    Followed by   Jamey Arizmendi ON 12/9/2021] apixaban  5 mg Oral BID    insulin lispro  0-12 Units SubCUTAneous TID WC    insulin lispro  0-6 Units SubCUTAneous Nightly    dexamethasone  10 mg IntraVENous Q24H faint blush of active hemorrhage as detailed above. Other etiologies   including neoplasia thought unlikely. Correlation with clinical presentation   and continued short-term follow-up recommended. Small hiatal hernia. Mild diverticulosis. Other chronic appearing findings in the abdomen and pelvis. Findings were discussed with Dr. Danny Badillo at approximately 0230 hours [place]   time on 2021. Labs:  Lab Results   Component Value Date    WBC 9.0 2021    HGB 10.8 2021    HCT 31.8 2021    MCV 88.1 2021    MCH 29.9 2021    MCHC 34.0 2021    RDW 12.9 2021     2021    MPV 11.5 2021     Lab Results   Component Value Date     2021    K 4.4 2021    K 3.3 2021     2021    CO2 24 2021    BUN 30 2021    CREATININE 0.7 2021    LABALBU 3.2 2021    CALCIUM 9.3 2021    GFRAA >60 2021    LABGLOM >60 2021     Lab Results   Component Value Date    PROTIME 15.5 2021    INR 1.4 2021     No results for input(s): PROBNP in the last 72 hours. Recent Labs     21  0500   PROCAL 0.10*     This SmartLink has not been configured with any valid records.        Micro:  Original date of  testin2021  Vaccination status: Not vaccinated  Current oxygen delivery system: 7L NC  Dexamethasone dosin mg p.o. daily start date: 2021 increased to 10 mg IV twice daily 2021  Remdesivir Y/N:  None   Baricitinib Y/N: 4 mg daily for 14 days started 2021  Tocilizumab Y/N:   None   Respiratory cultures Y/N:   Strep pneumoniae and Legionella Urine Antigen test Y/N: results: None  Antibiotics Y/N with start and stop dates: None  CRP: 27.4> 12.0>21.4-> 4.6  D Dimer: 3495-> 2258  INR 1.4  Procalcitonin: 1.00> 0.43  Ferritin 171  Sed rate: 80  DVT prophylaxis: Lovenox 1 mg/kg twice daily  GI prophylaxis: Protonix   PT/OT: Ordered  Lung recruitment

## 2021-12-07 ENCOUNTER — APPOINTMENT (OUTPATIENT)
Dept: GENERAL RADIOLOGY | Age: 71
DRG: 177 | End: 2021-12-07
Payer: MEDICARE

## 2021-12-07 LAB
ALBUMIN SERPL-MCNC: 3.1 G/DL (ref 3.5–5.2)
ALP BLD-CCNC: 38 U/L (ref 40–129)
ALT SERPL-CCNC: 28 U/L (ref 0–40)
ANION GAP SERPL CALCULATED.3IONS-SCNC: 11 MMOL/L (ref 7–16)
AST SERPL-CCNC: 16 U/L (ref 0–39)
BASOPHILS ABSOLUTE: 0.02 E9/L (ref 0–0.2)
BASOPHILS RELATIVE PERCENT: 0.2 % (ref 0–2)
BILIRUB SERPL-MCNC: 0.5 MG/DL (ref 0–1.2)
BUN BLDV-MCNC: 26 MG/DL (ref 6–23)
CALCIUM SERPL-MCNC: 9 MG/DL (ref 8.6–10.2)
CHLORIDE BLD-SCNC: 99 MMOL/L (ref 98–107)
CO2: 25 MMOL/L (ref 22–29)
CREAT SERPL-MCNC: 0.6 MG/DL (ref 0.7–1.2)
D DIMER: 2593 NG/ML DDU
EOSINOPHILS ABSOLUTE: 0.12 E9/L (ref 0.05–0.5)
EOSINOPHILS RELATIVE PERCENT: 1.2 % (ref 0–6)
GFR AFRICAN AMERICAN: >60
GFR NON-AFRICAN AMERICAN: >60 ML/MIN/1.73
GLUCOSE BLD-MCNC: 103 MG/DL (ref 74–99)
HCT VFR BLD CALC: 31.1 % (ref 37–54)
HEMOGLOBIN: 10.5 G/DL (ref 12.5–16.5)
IMMATURE GRANULOCYTES #: 0.25 E9/L
IMMATURE GRANULOCYTES %: 2.6 % (ref 0–5)
LYMPHOCYTES ABSOLUTE: 0.59 E9/L (ref 1.5–4)
LYMPHOCYTES RELATIVE PERCENT: 6.1 % (ref 20–42)
MCH RBC QN AUTO: 29.9 PG (ref 26–35)
MCHC RBC AUTO-ENTMCNC: 33.8 % (ref 32–34.5)
MCV RBC AUTO: 88.6 FL (ref 80–99.9)
METER GLUCOSE: 132 MG/DL (ref 74–99)
METER GLUCOSE: 136 MG/DL (ref 74–99)
METER GLUCOSE: 233 MG/DL (ref 74–99)
METER GLUCOSE: 244 MG/DL (ref 74–99)
MONOCYTES ABSOLUTE: 0.28 E9/L (ref 0.1–0.95)
MONOCYTES RELATIVE PERCENT: 2.9 % (ref 2–12)
NEUTROPHILS ABSOLUTE: 8.41 E9/L (ref 1.8–7.3)
NEUTROPHILS RELATIVE PERCENT: 87 % (ref 43–80)
PDW BLD-RTO: 13.1 FL (ref 11.5–15)
PLATELET # BLD: 300 E9/L (ref 130–450)
PMV BLD AUTO: 11.4 FL (ref 7–12)
POTASSIUM SERPL-SCNC: 4.8 MMOL/L (ref 3.5–5)
PROCALCITONIN: 0.07 NG/ML (ref 0–0.08)
RBC # BLD: 3.51 E12/L (ref 3.8–5.8)
RBC # BLD: NORMAL 10*6/UL
SODIUM BLD-SCNC: 135 MMOL/L (ref 132–146)
TOTAL PROTEIN: 5.7 G/DL (ref 6.4–8.3)
WBC # BLD: 9.7 E9/L (ref 4.5–11.5)

## 2021-12-07 PROCEDURE — 6370000000 HC RX 637 (ALT 250 FOR IP): Performed by: INTERNAL MEDICINE

## 2021-12-07 PROCEDURE — 71045 X-RAY EXAM CHEST 1 VIEW: CPT

## 2021-12-07 PROCEDURE — 6360000002 HC RX W HCPCS: Performed by: NURSE PRACTITIONER

## 2021-12-07 PROCEDURE — 2700000000 HC OXYGEN THERAPY PER DAY

## 2021-12-07 PROCEDURE — 99291 CRITICAL CARE FIRST HOUR: CPT | Performed by: INTERNAL MEDICINE

## 2021-12-07 PROCEDURE — 84145 PROCALCITONIN (PCT): CPT

## 2021-12-07 PROCEDURE — 36415 COLL VENOUS BLD VENIPUNCTURE: CPT

## 2021-12-07 PROCEDURE — 2580000003 HC RX 258: Performed by: INTERNAL MEDICINE

## 2021-12-07 PROCEDURE — 6370000000 HC RX 637 (ALT 250 FOR IP): Performed by: NURSE PRACTITIONER

## 2021-12-07 PROCEDURE — 1200000000 HC SEMI PRIVATE

## 2021-12-07 PROCEDURE — 80053 COMPREHEN METABOLIC PANEL: CPT

## 2021-12-07 PROCEDURE — 82962 GLUCOSE BLOOD TEST: CPT

## 2021-12-07 PROCEDURE — 85378 FIBRIN DEGRADE SEMIQUANT: CPT

## 2021-12-07 PROCEDURE — 85025 COMPLETE CBC W/AUTO DIFF WBC: CPT

## 2021-12-07 RX ORDER — SODIUM CHLORIDE 9 MG/ML
INJECTION, SOLUTION INTRAVENOUS CONTINUOUS
Status: DISCONTINUED | OUTPATIENT
Start: 2021-12-07 | End: 2021-12-09 | Stop reason: HOSPADM

## 2021-12-07 RX ORDER — 0.9 % SODIUM CHLORIDE 0.9 %
250 INTRAVENOUS SOLUTION INTRAVENOUS ONCE
Status: COMPLETED | OUTPATIENT
Start: 2021-12-07 | End: 2021-12-07

## 2021-12-07 RX ADMIN — BARICITINIB 4 MG: 2 TABLET, FILM COATED ORAL at 10:04

## 2021-12-07 RX ADMIN — Medication 500 MG: at 10:05

## 2021-12-07 RX ADMIN — INSULIN LISPRO 2 UNITS: 100 INJECTION, SOLUTION INTRAVENOUS; SUBCUTANEOUS at 22:19

## 2021-12-07 RX ADMIN — FINASTERIDE 5 MG: 5 TABLET, FILM COATED ORAL at 10:04

## 2021-12-07 RX ADMIN — ZINC SULFATE 220 MG (50 MG) CAPSULE 50 MG: CAPSULE at 10:05

## 2021-12-07 RX ADMIN — FERROUS SULFATE TAB 325 MG (65 MG ELEMENTAL FE) 325 MG: 325 (65 FE) TAB at 10:05

## 2021-12-07 RX ADMIN — EZETIMIBE 10 MG: 10 TABLET ORAL at 10:04

## 2021-12-07 RX ADMIN — APIXABAN 10 MG: 5 TABLET, FILM COATED ORAL at 10:05

## 2021-12-07 RX ADMIN — FENOFIBRATE 160 MG: 160 TABLET ORAL at 10:05

## 2021-12-07 RX ADMIN — CITALOPRAM HYDROBROMIDE 40 MG: 20 TABLET ORAL at 10:04

## 2021-12-07 RX ADMIN — FERROUS SULFATE TAB 325 MG (65 MG ELEMENTAL FE) 325 MG: 325 (65 FE) TAB at 17:28

## 2021-12-07 RX ADMIN — APIXABAN 10 MG: 5 TABLET, FILM COATED ORAL at 21:51

## 2021-12-07 RX ADMIN — RISPERIDONE 0.25 MG: 0.25 TABLET, FILM COATED ORAL at 21:51

## 2021-12-07 RX ADMIN — SODIUM CHLORIDE 250 ML: 9 INJECTION, SOLUTION INTRAVENOUS at 10:23

## 2021-12-07 RX ADMIN — METOPROLOL SUCCINATE 25 MG: 25 TABLET, FILM COATED, EXTENDED RELEASE ORAL at 10:05

## 2021-12-07 RX ADMIN — TAMSULOSIN HYDROCHLORIDE 0.4 MG: 0.4 CAPSULE ORAL at 10:04

## 2021-12-07 RX ADMIN — Medication 10 ML: at 10:05

## 2021-12-07 RX ADMIN — OLANZAPINE 5 MG: 5 TABLET, FILM COATED ORAL at 10:04

## 2021-12-07 RX ADMIN — TAMSULOSIN HYDROCHLORIDE 0.4 MG: 0.4 CAPSULE ORAL at 21:51

## 2021-12-07 RX ADMIN — RISPERIDONE 0.25 MG: 0.25 TABLET, FILM COATED ORAL at 10:04

## 2021-12-07 RX ADMIN — SODIUM CHLORIDE: 9 INJECTION, SOLUTION INTRAVENOUS at 11:49

## 2021-12-07 RX ADMIN — INSULIN LISPRO 4 UNITS: 100 INJECTION, SOLUTION INTRAVENOUS; SUBCUTANEOUS at 17:29

## 2021-12-07 RX ADMIN — PANTOPRAZOLE SODIUM 40 MG: 40 TABLET, DELAYED RELEASE ORAL at 06:03

## 2021-12-07 RX ADMIN — DEXAMETHASONE SODIUM PHOSPHATE 10 MG: 10 INJECTION INTRAMUSCULAR; INTRAVENOUS at 10:05

## 2021-12-07 ASSESSMENT — PAIN SCALES - GENERAL
PAINLEVEL_OUTOF10: 0
PAINLEVEL_OUTOF10: 0

## 2021-12-07 NOTE — PROGRESS NOTES
RN transferred patient into chair. Patient became unresponsive. RRT was called. Patient was assisted back to bed via 3 staff members. Patient came to rather quickly. NS bolus was ordered. Dr. Diane Kennedy was on scene and did update Dr. Hoffman Alert via phone. Message was left for spouse to return phone call.

## 2021-12-07 NOTE — PROGRESS NOTES
Admit Date: 11/28/2021    Subjective:     Feels little better     Objective:     Patient Vitals for the past 8 hrs:   SpO2   12/07/21 0611 97 %     I/O last 3 completed shifts: In: 200 [P.O.:200]  Out: 1800 [Urine:1800]  No intake/output data recorded. HEENT: Normal  NECK: Thyroid normal. No carotid bruit. No lymphphadenopathy. CVS: RRR  RS: Clear. No wheeze. No rhonchi. ABD: Soft. Non tender. No mass. Normal BS. EXT: No edema. Non tender. Pulses present.    NEURO:no focal deficit       Scheduled Meds:   vitamin D  50,000 Units Oral Weekly    apixaban  10 mg Oral BID    Followed by   Dimas Saldaña ON 12/9/2021] apixaban  5 mg Oral BID    insulin lispro  0-12 Units SubCUTAneous TID WC    insulin lispro  0-6 Units SubCUTAneous Nightly    dexamethasone  10 mg IntraVENous Q24H    citalopram  40 mg Oral Daily    ezetimibe  10 mg Oral Daily    fenofibrate  160 mg Oral Daily    ferrous sulfate  325 mg Oral BID    finasteride  5 mg Oral Daily    metoprolol succinate  25 mg Oral Daily    OLANZapine  5 mg Oral Daily    pantoprazole  40 mg Oral QAM AC    risperiDONE  0.25 mg Oral BID    tamsulosin  0.4 mg Oral BID    sodium chloride flush  5-40 mL IntraVENous 2 times per day    ascorbic acid  500 mg Oral Daily    zinc sulfate  50 mg Oral Daily    baricitinib  4 mg Oral Daily     Continuous Infusions:   sodium chloride      dextrose         CBC with Differential:    Lab Results   Component Value Date    WBC 9.7 12/07/2021    RBC 3.51 12/07/2021    HGB 10.5 12/07/2021    HCT 31.1 12/07/2021     12/07/2021    MCV 88.6 12/07/2021    MCH 29.9 12/07/2021    MCHC 33.8 12/07/2021    RDW 13.1 12/07/2021    NRBC 0.0 12/05/2021    SEGSPCT 78 08/14/2013    METASPCT 1.0 12/05/2021    LYMPHOPCT 6.1 12/07/2021    MONOPCT 2.9 12/07/2021    MYELOPCT 1.0 12/05/2021    EOSPCT 13.3 06/18/2021    BASOPCT 0.2 12/07/2021    MONOSABS 0.28 12/07/2021    LYMPHSABS 0.59 12/07/2021    EOSABS 0.12 12/07/2021    BASOSABS 0.02 12/07/2021     CMP:    Lab Results   Component Value Date     12/07/2021    K 4.8 12/07/2021    K 3.3 11/28/2021    CL 99 12/07/2021    CO2 25 12/07/2021    BUN 26 12/07/2021    CREATININE 0.6 12/07/2021    GFRAA >60 12/07/2021    LABGLOM >60 12/07/2021    PROT 5.7 12/07/2021    LABALBU 3.1 12/07/2021    CALCIUM 9.0 12/07/2021    BILITOT 0.5 12/07/2021    ALKPHOS 38 12/07/2021    AST 16 12/07/2021    ALT 28 12/07/2021     PT/INR:    Lab Results   Component Value Date    PROTIME 15.5 11/29/2021    INR 1.4 11/29/2021       Assessment:     Active Problems:   Respiratory failure ([address] hypoxic due to     Pneumonia due to COVID-19 virus    PE     Type 2 diabetes mellitus       Plan:   Continue supportive care wean O2 as able

## 2021-12-07 NOTE — CARE COORDINATION
+ covid; pt weaned to 11l; plan is [facility] when 02 liters are acceptable. hens; n-17 ; transport forms on chart. Will continue to follow. Tariq Sousa.

## 2021-12-07 NOTE — PROGRESS NOTES
Physical Therapy    Facility/Department: [address] MED SURG/TELE     NAME: Cassidy Lowry  : 1950  MRN: [id number]    Date of Service: 2021    PT f/u treatment was attempted this am. Per chart pt has been having difficulty maintaining O2 sat in the 90s and his supplemental O2 has been increased. Prior to my entering pt's room, PCA reports staff just recently attempted to get pt up to EOB and he became unresponsive and an RRT had been called. Will hold PT treatment this date and re-attempt PT another time when his O2 sat is more stable. Kate Willett, P.T.   License Number: [id number]

## 2021-12-07 NOTE — PROGRESS NOTES
Mohini Tierney M.D.,[facility]  Mac Kay D.O., F.A.C.O.I., Gulshan Castillo M.D. Kyle Thorne M.D. Edwin Pérez D.O. Daily Pulmonary Progress Note    Patient:  Darnell Triplett 66 y.o. male MRN: [id number]     Date of Service: 12/7/2021      Synopsis     We are following patient for respiratory failure with hypoxia, shortness of breath, covid 19 pneumonia, PE    \"CC\" shortness of breath     Code status: FULL       Subjective      Patient seen and examined. Laying in bed s/p RRT for going unresponsive. (+) ortho Bps. 250cc fluids given and patient put back to bed. Patient now alert and answering questions. Patient stated he got dizzy then didn't know what happened. Discussed care with bedside nurse. She will wean his oxygen as tolerated. Review of Systems:  Constitutional: Negative for fatigue, chills and fever. HENT: Negative for postnasal drip, rhinorrhea and sore throat. Eyes: Negative for visual disturbance. Respiratory: Positive for cough, denies shortness of breath   Cardiovascular: Negative for chest pain. Gastrointestinal: Negative for abdominal pain, nausea, blood in stool, constipation, nausea and vomiting. Genitourinary: Negative for difficulty urinating, dysuria and urgency. Musculoskeletal: Negative for back pain. Skin: Negative for rash. Neurological:  Negative for dizziness, numbness and headaches.      24-hour events:  RRT     Objective   Vitals: /60   Pulse 79   Temp 98.5 °F (36.9 °C) (Oral)   Resp 20   Ht 5' 6\" (1.676 m)   Wt 181 lb 6.4 oz (82.3 kg)   SpO2 93%   BMI 29.28 kg/m²     I/O:    Intake/Output Summary (Last 24 hours) at 12/7/2021 1348  Last data filed at 12/7/2021 0956  Gross per 24 hour   Intake 800 ml   Output 1000 ml   Net -200 ml       Vent Information  SpO2: 93 %      CURRENT MEDS :  Scheduled Meds:   vitamin D  50,000 Units Oral Weekly    apixaban  10 mg Oral BID    Followed by   Trinh Leach ON 12/9/2021] apixaban  5 mg Oral BID    insulin lispro  0-12 Units SubCUTAneous TID WC    insulin lispro  0-6 Units SubCUTAneous Nightly    dexamethasone  10 mg IntraVENous Q24H    citalopram  40 mg Oral Daily    ezetimibe  10 mg Oral Daily    fenofibrate  160 mg Oral Daily    ferrous sulfate  325 mg Oral BID    finasteride  5 mg Oral Daily    metoprolol succinate  25 mg Oral Daily    OLANZapine  5 mg Oral Daily    pantoprazole  40 mg Oral QAM AC    risperiDONE  0.25 mg Oral BID    tamsulosin  0.4 mg Oral BID    sodium chloride flush  5-40 mL IntraVENous 2 times per day    ascorbic acid  500 mg Oral Daily    zinc sulfate  50 mg Oral Daily    baricitinib  4 mg Oral Daily       Physical Exam:  General: Lying in bed comfortably, no distress, breathing is not labored  HEENT: PERRL, EOMI, MMM, no oral lesions  Neck: supple, no adenopathy  CV: RRR without murmur  Lungs: clear to auscultation bilaterally without wheezing, no crackles  Abd: soft, NT, ND, bowel sounds normal  Ext: warm, no edema, no clubbing  Skin: no rashes  Neuro: CN II-XII grossly intact, no focal deficits    Pertinent/ New Labs and Imaging Studies     Imaging Personally Reviewed:  Chest x-ray 12/7  Bilateral ground-glass density infiltrates are seen throughout both lungs   more prominent in the left parahilar region extends into the periphery of the   lung and more widely distributed the in the right lung.       There is no pleural effusions.       Heart is normal size.  The mediastinum appears unremarkable. CTA chest/CT abdomen  Impression   CT chest:       Tiny bilateral lower lobe subsegmental pulmonary emboli. No large or central   embolism and no evidence of right heart strain. Bilateral parenchymal infiltrates concerning for multifocal pneumonia,   probably viral.  Localized airspace opacities in the base of the lower lobes   are also likely due to pneumonia but short-term follow-up is recommended to   exclude the unlikely possibility of neoplasia. start and stop dates: None  CRP: 27.4> 12.0>21.4-> 4.6  D Dimer: 3495-> 2258  INR 1.4  Procalcitonin: 1.00> 0.43  Ferritin 171  Sed rate: 80  DVT prophylaxis: Lovenox 1 mg/kg twice daily  GI prophylaxis: Protonix   PT/OT: Ordered  Lung recruitment techniques: incentive spirometer     Assessment:    1. Acute respiratory failure with hypoxia  2. Moderate COVID-19 pneumonia  3. Pulmonary embolism provoked by COVID-19 infection  4. Question of rectus sheath hematoma  5. Diabetes mellitus type 2  6. Lymphopenia related to viral infection  7. Schizophrenia      Plan:   1. Oxygen therapy 11L high flow cannula wean to keep saturation greater than 88%, increased to 14L today s/p RRT. 250cc IVF given for (+) ortho Bp  2. Dexamethasone daily dosing x 5 days, vitamin cocktail  3. Continue Baricitinib 4mg x14 doses  4. Eliquis 10 mg po bid x 7 days then decrease to 5 mg po bid maintenance dosing. Monitor for increasing rectus sheath hematoma and for anemia. Any signs of bleeding or anemia will need IVC filter. Hgb 10.5 today   5. Increase activity as tolerated, edge of bed   6. Trend inflammatory markers: stable  7. CXR with stable infiltrates, no acute process. 8. Incentive spirometer, lung recruitment maneuvers  9. Dvt, gi prophylaxis    This plan of care was reviewed in collaboration with Dr. Sloane Lama   Electronically signed by TYSON Rubin CNP on 12/7/2021 at 1:48 PM       I personally saw, examined, and cared for the patient. Labs, medications, radiographs reviewed. I agree with history exam and plans detailed in NP note.         Yesi Mayers,

## 2021-12-08 LAB
ALBUMIN SERPL-MCNC: 2.8 G/DL (ref 3.5–5.2)
ALP BLD-CCNC: 52 U/L (ref 40–129)
ALT SERPL-CCNC: 31 U/L (ref 0–40)
ANION GAP SERPL CALCULATED.3IONS-SCNC: 8 MMOL/L (ref 7–16)
AST SERPL-CCNC: 21 U/L (ref 0–39)
BILIRUB SERPL-MCNC: 0.4 MG/DL (ref 0–1.2)
BUN BLDV-MCNC: 26 MG/DL (ref 6–23)
C-REACTIVE PROTEIN: 8.2 MG/DL (ref 0–0.4)
CALCIUM SERPL-MCNC: 8.9 MG/DL (ref 8.6–10.2)
CHLORIDE BLD-SCNC: 101 MMOL/L (ref 98–107)
CO2: 25 MMOL/L (ref 22–29)
CREAT SERPL-MCNC: 0.8 MG/DL (ref 0.7–1.2)
GFR AFRICAN AMERICAN: >60
GFR NON-AFRICAN AMERICAN: >60 ML/MIN/1.73
GLUCOSE BLD-MCNC: 140 MG/DL (ref 74–99)
METER GLUCOSE: 127 MG/DL (ref 74–99)
METER GLUCOSE: 174 MG/DL (ref 74–99)
METER GLUCOSE: 209 MG/DL (ref 74–99)
METER GLUCOSE: 283 MG/DL (ref 74–99)
POTASSIUM SERPL-SCNC: 4.8 MMOL/L (ref 3.5–5)
SODIUM BLD-SCNC: 134 MMOL/L (ref 132–146)
TOTAL PROTEIN: 6.1 G/DL (ref 6.4–8.3)

## 2021-12-08 PROCEDURE — 2700000000 HC OXYGEN THERAPY PER DAY

## 2021-12-08 PROCEDURE — 6370000000 HC RX 637 (ALT 250 FOR IP): Performed by: NURSE PRACTITIONER

## 2021-12-08 PROCEDURE — 6370000000 HC RX 637 (ALT 250 FOR IP): Performed by: INTERNAL MEDICINE

## 2021-12-08 PROCEDURE — 36415 COLL VENOUS BLD VENIPUNCTURE: CPT

## 2021-12-08 PROCEDURE — 82962 GLUCOSE BLOOD TEST: CPT

## 2021-12-08 PROCEDURE — 6360000002 HC RX W HCPCS: Performed by: NURSE PRACTITIONER

## 2021-12-08 PROCEDURE — 97110 THERAPEUTIC EXERCISES: CPT

## 2021-12-08 PROCEDURE — 97530 THERAPEUTIC ACTIVITIES: CPT

## 2021-12-08 PROCEDURE — 86140 C-REACTIVE PROTEIN: CPT

## 2021-12-08 PROCEDURE — 80053 COMPREHEN METABOLIC PANEL: CPT

## 2021-12-08 PROCEDURE — 1200000000 HC SEMI PRIVATE

## 2021-12-08 PROCEDURE — 2580000003 HC RX 258: Performed by: INTERNAL MEDICINE

## 2021-12-08 RX ADMIN — INSULIN LISPRO 3 UNITS: 100 INJECTION, SOLUTION INTRAVENOUS; SUBCUTANEOUS at 21:10

## 2021-12-08 RX ADMIN — INSULIN LISPRO 2 UNITS: 100 INJECTION, SOLUTION INTRAVENOUS; SUBCUTANEOUS at 10:58

## 2021-12-08 RX ADMIN — FENOFIBRATE 160 MG: 160 TABLET ORAL at 08:19

## 2021-12-08 RX ADMIN — TAMSULOSIN HYDROCHLORIDE 0.4 MG: 0.4 CAPSULE ORAL at 08:20

## 2021-12-08 RX ADMIN — APIXABAN 10 MG: 5 TABLET, FILM COATED ORAL at 08:20

## 2021-12-08 RX ADMIN — PANTOPRAZOLE SODIUM 40 MG: 40 TABLET, DELAYED RELEASE ORAL at 06:31

## 2021-12-08 RX ADMIN — FERROUS SULFATE TAB 325 MG (65 MG ELEMENTAL FE) 325 MG: 325 (65 FE) TAB at 08:19

## 2021-12-08 RX ADMIN — ACETAMINOPHEN 650 MG: 325 TABLET ORAL at 11:01

## 2021-12-08 RX ADMIN — METOPROLOL SUCCINATE 25 MG: 25 TABLET, FILM COATED, EXTENDED RELEASE ORAL at 08:20

## 2021-12-08 RX ADMIN — DEXAMETHASONE SODIUM PHOSPHATE 10 MG: 10 INJECTION INTRAMUSCULAR; INTRAVENOUS at 10:57

## 2021-12-08 RX ADMIN — SODIUM CHLORIDE: 9 INJECTION, SOLUTION INTRAVENOUS at 17:07

## 2021-12-08 RX ADMIN — APIXABAN 10 MG: 5 TABLET, FILM COATED ORAL at 20:21

## 2021-12-08 RX ADMIN — OLANZAPINE 5 MG: 5 TABLET, FILM COATED ORAL at 08:19

## 2021-12-08 RX ADMIN — FINASTERIDE 5 MG: 5 TABLET, FILM COATED ORAL at 08:19

## 2021-12-08 RX ADMIN — INSULIN LISPRO 4 UNITS: 100 INJECTION, SOLUTION INTRAVENOUS; SUBCUTANEOUS at 16:08

## 2021-12-08 RX ADMIN — ZINC SULFATE 220 MG (50 MG) CAPSULE 50 MG: CAPSULE at 08:19

## 2021-12-08 RX ADMIN — RISPERIDONE 0.25 MG: 0.25 TABLET, FILM COATED ORAL at 20:21

## 2021-12-08 RX ADMIN — CITALOPRAM HYDROBROMIDE 40 MG: 20 TABLET ORAL at 08:20

## 2021-12-08 RX ADMIN — Medication 500 MG: at 08:20

## 2021-12-08 RX ADMIN — BARICITINIB 4 MG: 2 TABLET, FILM COATED ORAL at 08:19

## 2021-12-08 RX ADMIN — FERROUS SULFATE TAB 325 MG (65 MG ELEMENTAL FE) 325 MG: 325 (65 FE) TAB at 16:08

## 2021-12-08 RX ADMIN — TAMSULOSIN HYDROCHLORIDE 0.4 MG: 0.4 CAPSULE ORAL at 20:21

## 2021-12-08 RX ADMIN — RISPERIDONE 0.25 MG: 0.25 TABLET, FILM COATED ORAL at 08:19

## 2021-12-08 RX ADMIN — EZETIMIBE 10 MG: 10 TABLET ORAL at 08:19

## 2021-12-08 ASSESSMENT — PAIN SCALES - GENERAL
PAINLEVEL_OUTOF10: 0
PAINLEVEL_OUTOF10: 0
PAINLEVEL_OUTOF10: 5

## 2021-12-08 ASSESSMENT — PAIN DESCRIPTION - ORIENTATION: ORIENTATION: RIGHT;LEFT

## 2021-12-08 ASSESSMENT — PAIN DESCRIPTION - LOCATION
LOCATION: LEG
LOCATION: LEG

## 2021-12-08 ASSESSMENT — PAIN DESCRIPTION - PAIN TYPE
TYPE: ACUTE PAIN
TYPE: ACUTE PAIN

## 2021-12-08 ASSESSMENT — PAIN DESCRIPTION - FREQUENCY: FREQUENCY: INTERMITTENT

## 2021-12-08 ASSESSMENT — PAIN - FUNCTIONAL ASSESSMENT: PAIN_FUNCTIONAL_ASSESSMENT: ACTIVITIES ARE NOT PREVENTED

## 2021-12-08 ASSESSMENT — PAIN DESCRIPTION - DESCRIPTORS: DESCRIPTORS: ACHING

## 2021-12-08 NOTE — PROGRESS NOTES
Admit Date: 11/28/2021    Subjective:     Last night episode noted ,feels fine this Am no complaint     Objective:     No data found. I/O last 3 completed shifts: In: 800 [P.O.:800]  Out: 1350 [CKNIS:7616]  I/O this shift:  In: -   Out: 850 [Urine:850]    HEENT: Normal  NECK: Thyroid normal. No carotid bruit. No lymphphadenopathy. CVS: RRR  RS: Clear. No wheeze. No rhonchi. ABD: Soft. Non tender. No mass. Normal BS. EXT: No edema. Non tender. Pulses present.    NEURO: no focal deficit       Scheduled Meds:   vitamin D  50,000 Units Oral Weekly    apixaban  10 mg Oral BID    Followed by   Baron Su ON 12/9/2021] apixaban  5 mg Oral BID    insulin lispro  0-12 Units SubCUTAneous TID WC    insulin lispro  0-6 Units SubCUTAneous Nightly    dexamethasone  10 mg IntraVENous Q24H    citalopram  40 mg Oral Daily    ezetimibe  10 mg Oral Daily    fenofibrate  160 mg Oral Daily    ferrous sulfate  325 mg Oral BID    finasteride  5 mg Oral Daily    metoprolol succinate  25 mg Oral Daily    OLANZapine  5 mg Oral Daily    pantoprazole  40 mg Oral QAM AC    risperiDONE  0.25 mg Oral BID    tamsulosin  0.4 mg Oral BID    sodium chloride flush  5-40 mL IntraVENous 2 times per day    ascorbic acid  500 mg Oral Daily    zinc sulfate  50 mg Oral Daily    baricitinib  4 mg Oral Daily     Continuous Infusions:   sodium chloride 100 mL/hr at 12/07/21 1149    sodium chloride      dextrose         CBC with Differential:    Lab Results   Component Value Date    WBC 9.7 12/07/2021    RBC 3.51 12/07/2021    HGB 10.5 12/07/2021    HCT 31.1 12/07/2021     12/07/2021    MCV 88.6 12/07/2021    MCH 29.9 12/07/2021    MCHC 33.8 12/07/2021    RDW 13.1 12/07/2021    NRBC 0.0 12/05/2021    SEGSPCT 78 08/14/2013    METASPCT 1.0 12/05/2021    LYMPHOPCT 6.1 12/07/2021    MONOPCT 2.9 12/07/2021    MYELOPCT 1.0 12/05/2021    EOSPCT 13.3 06/18/2021    BASOPCT 0.2 12/07/2021    MONOSABS 0.28 12/07/2021    LYMPHSABS 0.59 12/07/2021    EOSABS 0.12 12/07/2021    BASOSABS 0.02 12/07/2021     CMP:    Lab Results   Component Value Date     12/08/2021    K 4.8 12/08/2021    K 3.3 11/28/2021     12/08/2021    CO2 25 12/08/2021    BUN 26 12/08/2021    CREATININE 0.8 12/08/2021    GFRAA >60 12/08/2021    LABGLOM >60 12/08/2021    PROT 6.1 12/08/2021    LABALBU 2.8 12/08/2021    CALCIUM 8.9 12/08/2021    BILITOT 0.4 12/08/2021    ALKPHOS 52 12/08/2021    AST 21 12/08/2021    ALT 31 12/08/2021     PT/INR:    Lab Results   Component Value Date    PROTIME 15.5 11/29/2021    INR 1.4 11/29/2021       Assessment:     Active Problems:   Respiratory failure ([address] hypoxic due to     Pneumonia due to COVID-19 virus    PE     Type 2 diabetes mellitus     Orthostatic hypotension     Plan:   Slow improvement ,continue same

## 2021-12-08 NOTE — PROGRESS NOTES
Physical Therapy    Facility/Department: [address] MED SURG/TELE  Treatment Note  NAME: Vasquez Moreno  : 1950  MRN: [id number]    Date of Service: 2021      Evaluating Therapist: Flaco Hung PT    Room #:  7594/0879-T  Diagnosis:  Respiratory failure ([address] [J96.90]  Multiple subsegmental pulmonary emboli without acute cor pulmonale (HCC) [I26.94]  Acute respiratory failure due to COVID-19 (HCC) [U07.1, J96.00]  PMHx/PSHx:  CAD, intellectual disability, schizophrenia,   Precautions:  Falls, O2, alarm    Social:  Pt lives with wife (wife also in hospital and wife w/c bound) Per chart pt admitted from behavioral [facility] center. Pt states he walks with a quad cane. .   Pt states he lives with his girlfriend Laura Dinh in a big house. Initial Evaluation  Date: 21 Treatment      Short Term/ Long Term   Goals   Was pt agreeable to Eval/treatment? yes yes    Does pt have pain? No c/o pain No c/o pain    Bed Mobility  Rolling: min assist  Supine to sit: min assist  Sit to supine: mod assist  Scooting: min assist Rolling: SBA  Supine to sit: SBA  Sit to supine: MIN A  Scooting: SBA  Independent    Transfers Sit to stand: min assist  Stand to sit: min assist  Stand pivot: NT Sit to stand: MIN A  Stand to sit: MIN A  Stand pivot: NA SBA   Ambulation    20 feet with ww with min to mod assist. Increased difficulty with turns 3 side steps with holding onto bed rail and MIN A 50 feet with ww with SBA   Stair Negotiation  Ascended and descended  NT NA     AM-PAC Raw score                16      BLE ROM is WFL  BLE Strength is grossly 4-/5 to 4/5  Balance: sitting EOB is SBA and standing with holding onto bed rail was MIN A    Therapeutic Exercises:  Pt sat on EOB x 15 min working on sitting posture, core strength, and upright tolerance. Patient education  Pt educated on moving slow to avoid excess light headedness.      Patient response to education:   Pt verbalized understanding Pt demonstrated skill Pt requires further education in this area   yes yes yes     ASSESSMENT:    Comments:  Pt was found in bed and agreeable to PT. He was on 9L O2 and had no dyspnea or SOB. Pt sat on EOB as noted above and c/o increased light headedness to slowly subsided with time. He then stood next to the bed for 3 min and had c/o light headedness that got worse with time and he sat on EOB. Pt sat EOB for 3 more minutes and light headedness improved and he then asked to lie back down due to fatigue. Pt had no SOB with activity or while sitting or standing. Treatment:  Patient practiced and was instructed in the following treatment:     Bed mobility, transfers, sitting EOB, standing and side stepping to improve functional strength, tolerance to being upright, and endurance. Pt was left supine in bed with call light left by patient. Chair/bed alarm: bed alarm was re-activated    PLAN:    Pt is making good progress toward established Physical Therapy goals. Continue with physical therapy current plan of care. Time in  13:50  Time out  14:15    Total Treatment Time  25 minutes     Evaluation Time includes thorough review of current medical information, gathering information on past medical history/social history and prior level of function, completion of standardized testing/informal observation of tasks, assessment of data and education on plan of care and goals. CPT codes:  [] Low Complexity PT evaluation 65754  [] Moderate Complexity PT evaluation 08377  [] High Complexity PT evaluation 78771  [] PT Re-evaluation 16610  [] Gait training 20889 ** minutes  [] Manual therapy 75672 ** minutes  [x] Therapeutic activities 65917 10 minutes  [x] Therapeutic exercises 44340 15 minutes  [] Neuromuscular reeducation 15925 ** minutes     Timothy B. Diania Lennox., P.T.   License Number: [id number]

## 2021-12-08 NOTE — CARE COORDINATION
RRT yesterday for period of unresponsiveness, currently on 15 Lnc; d/c plan is [facility] . Will need precert. Transport forms on bandar meza, N-17. Adam Barrios.

## 2021-12-08 NOTE — PROGRESS NOTES
Jm Toledo M.D.,[facility]  Anabel Munoz D.O., F.A.C.O.I., Toby Zazueta M.D. Ale Leon M.D. Queen Amauri D.O. Daily Pulmonary Progress Note    Patient:  Devona Holter 66 y.o. male MRN: [id number]     Date of Service: 12/8/2021      Synopsis     We are following patient for respiratory failure with hypoxia, shortness of breath, covid 19 pneumonia, PE    \"CC\" shortness of breath     Code status: FULL       Subjective      Patient seen through window. Laying in bed on 11L HFNC without distress. Continue active weaning of oxygen as tolerated. Currently using his incentive spirometer. Review of Systems:  Constitutional: Negative for fatigue, chills and fever. HENT: Negative for postnasal drip, rhinorrhea and sore throat. Eyes: Negative for visual disturbance. Respiratory: Positive for cough, denies shortness of breath   Cardiovascular: Negative for chest pain. Gastrointestinal: Negative for abdominal pain, nausea, blood in stool, constipation, nausea and vomiting. Genitourinary: Negative for difficulty urinating, dysuria and urgency. Musculoskeletal: Negative for back pain. Skin: Negative for rash. Neurological:  Negative for dizziness, numbness and headaches.      24-hour events:  None     Objective   Vitals: BP (!) 103/59   Pulse 62   Temp 98 °F (36.7 °C) (Oral)   Resp 20   Ht 5' 6\" (1.676 m)   Wt 181 lb 6.4 oz (82.3 kg)   SpO2 100%   BMI 29.28 kg/m²     I/O:    Intake/Output Summary (Last 24 hours) at 12/8/2021 1225  Last data filed at 12/8/2021 7617  Gross per 24 hour   Intake --   Output 1400 ml   Net -1400 ml       Vent Information  SpO2: 100 %      CURRENT MEDS :  Scheduled Meds:   vitamin D  50,000 Units Oral Weekly    apixaban  10 mg Oral BID    Followed by    Dennard Gosselin ON 12/9/2021] apixaban  5 mg Oral BID    insulin lispro  0-12 Units SubCUTAneous TID WC    insulin lispro  0-6 Units SubCUTAneous Nightly    dexamethasone  10 mg IntraVENous Q24H citalopram  40 mg Oral Daily    ezetimibe  10 mg Oral Daily    fenofibrate  160 mg Oral Daily    ferrous sulfate  325 mg Oral BID    finasteride  5 mg Oral Daily    metoprolol succinate  25 mg Oral Daily    OLANZapine  5 mg Oral Daily    pantoprazole  40 mg Oral QAM AC    risperiDONE  0.25 mg Oral BID    tamsulosin  0.4 mg Oral BID    sodium chloride flush  5-40 mL IntraVENous 2 times per day    ascorbic acid  500 mg Oral Daily    zinc sulfate  50 mg Oral Daily    baricitinib  4 mg Oral Daily       Physical Exam:  Due to the current efforts to prevent transmission of COVID-19 and also the need to preserve PPE for other caregivers, a face-to-face encounter with the patient was not performed. That being said, all relevant records and diagnostic tests were reviewed, including laboratory results and imaging. Please reference any relevant documentation elsewhere. Care will be coordinated with the primary service. Pertinent/ New Labs and Imaging Studies     Imaging Personally Reviewed:  Chest x-ray 12/7  Bilateral ground-glass density infiltrates are seen throughout both lungs   more prominent in the left parahilar region extends into the periphery of the   lung and more widely distributed the in the right lung. There is no pleural effusions. Heart is normal size. The mediastinum appears unremarkable. CTA chest/CT abdomen  Impression   CT chest:       Tiny bilateral lower lobe subsegmental pulmonary emboli. No large or central   embolism and no evidence of right heart strain. Bilateral parenchymal infiltrates concerning for multifocal pneumonia,   probably viral.  Localized airspace opacities in the base of the lower lobes   are also likely due to pneumonia but short-term follow-up is recommended to   exclude the unlikely possibility of neoplasia. Scattered mediastinal lymph nodes favored to be reactive.        CT abdomen and pelvis:       Asymmetric thickening of the right rectus abdominus musculature in the   anterior pelvic wall concerning for a rectus sheath hematoma with possible   faint blush of active hemorrhage as detailed above. Other etiologies   including neoplasia thought unlikely. Correlation with clinical presentation   and continued short-term follow-up recommended. Small hiatal hernia. Mild diverticulosis. Other chronic appearing findings in the abdomen and pelvis. Findings were discussed with Dr. Denver Stewart at approximately 0230 hours [place]   time on 2021. Labs:  Lab Results   Component Value Date    WBC 9.7 2021    HGB 10.5 2021    HCT 31.1 2021    MCV 88.6 2021    MCH 29.9 2021    MCHC 33.8 2021    RDW 13.1 2021     2021    MPV 11.4 2021     Lab Results   Component Value Date     2021    K 4.8 2021    K 3.3 2021     2021    CO2 25 2021    BUN 26 2021    CREATININE 0.8 2021    LABALBU 2.8 2021    CALCIUM 8.9 2021    GFRAA >60 2021    LABGLOM >60 2021     Lab Results   Component Value Date    PROTIME 15.5 2021    INR 1.4 2021     No results for input(s): PROBNP in the last 72 hours. Recent Labs     21  0458   PROCAL 0.07     This SmartLink has not been configured with any valid records.        Micro:  Original date of  testin2021  Vaccination status: Not vaccinated  Current oxygen delivery system: 11L NC  Dexamethasone dosin mg p.o. daily start date: 2021 increased to 10 mg IV twice daily 2021  Remdesivir Y/N:  None   Baricitinib Y/N: 4 mg daily for 14 days started 2021  Tocilizumab Y/N:   None   Respiratory cultures Y/N:   Strep pneumoniae and Legionella Urine Antigen test Y/N: results: None  Antibiotics Y/N with start and stop dates: None  CRP: 27.4> 12.0>21.4-> 4.6  D Dimer: 3495-> 2258  INR 1.4  Procalcitonin: 1.00> 0.43  Ferritin 171  Sed rate: 80  DVT prophylaxis: Lovenox 1 mg/kg twice daily  GI prophylaxis: Protonix   PT/OT: Ordered  Lung recruitment techniques: incentive spirometer     Assessment:    Acute respiratory failure with hypoxia  Moderate COVID-19 pneumonia  Pulmonary embolism provoked by COVID-19 infection  Question of rectus sheath hematoma  Diabetes mellitus type 2  Lymphopenia related to viral infection  Schizophrenia      Plan:   Oxygen therapy 11L high flow cannula wean to keep saturation greater than 88%  Dexamethasone daily dosing x 5 days, vitamin cocktail  Continue Baricitinib 4mg x14 doses  Eliquis 10 mg po bid x 7 days then decrease to 5 mg po bid maintenance dosing. Monitor for increasing rectus sheath hematoma and for anemia. Any signs of bleeding or anemia will need IVC filter. Increase activity as tolerated, edge of bed   Dimer stable  Trend inflammatory markers: CRP elevated today, afebrile, WBC WNL  Incentive spirometer, lung recruitment maneuvers, increase activity as tolerated   Dvt, gi prophylaxis    This plan of care was reviewed in collaboration with Dr. Alexsandra Kim   Electronically signed by TYSON Tidwell CNP on 12/8/2021 at 12:25 PM       I personally saw, examined, and cared for the patient. Labs, medications, radiographs reviewed. I agree with history exam and plans detailed in NP note. Patient motivated with using incentive spirometry, and participating well with PT.     Jessika Vasques, DO

## 2021-12-09 VITALS
WEIGHT: 181.4 LBS | SYSTOLIC BLOOD PRESSURE: 121 MMHG | TEMPERATURE: 97.6 F | HEIGHT: 66 IN | DIASTOLIC BLOOD PRESSURE: 67 MMHG | OXYGEN SATURATION: 97 % | RESPIRATION RATE: 16 BRPM | BODY MASS INDEX: 29.15 KG/M2 | HEART RATE: 54 BPM

## 2021-12-09 LAB
ALBUMIN SERPL-MCNC: 2.6 G/DL (ref 3.5–5.2)
ALP BLD-CCNC: 35 U/L (ref 40–129)
ALT SERPL-CCNC: 40 U/L (ref 0–40)
ANION GAP SERPL CALCULATED.3IONS-SCNC: 9 MMOL/L (ref 7–16)
AST SERPL-CCNC: 28 U/L (ref 0–39)
BILIRUB SERPL-MCNC: 0.4 MG/DL (ref 0–1.2)
BUN BLDV-MCNC: 20 MG/DL (ref 6–23)
CALCIUM SERPL-MCNC: 8.3 MG/DL (ref 8.6–10.2)
CHLORIDE BLD-SCNC: 106 MMOL/L (ref 98–107)
CO2: 22 MMOL/L (ref 22–29)
CREAT SERPL-MCNC: 0.6 MG/DL (ref 0.7–1.2)
D DIMER: 1601 NG/ML DDU
GFR AFRICAN AMERICAN: >60
GFR NON-AFRICAN AMERICAN: >60 ML/MIN/1.73
GLUCOSE BLD-MCNC: 82 MG/DL (ref 74–99)
METER GLUCOSE: 146 MG/DL (ref 74–99)
METER GLUCOSE: 385 MG/DL (ref 74–99)
METER GLUCOSE: 95 MG/DL (ref 74–99)
POTASSIUM SERPL-SCNC: 4 MMOL/L (ref 3.5–5)
PROCALCITONIN: 0.04 NG/ML (ref 0–0.08)
SODIUM BLD-SCNC: 137 MMOL/L (ref 132–146)
TOTAL PROTEIN: 5.4 G/DL (ref 6.4–8.3)

## 2021-12-09 PROCEDURE — 6370000000 HC RX 637 (ALT 250 FOR IP): Performed by: INTERNAL MEDICINE

## 2021-12-09 PROCEDURE — 36415 COLL VENOUS BLD VENIPUNCTURE: CPT

## 2021-12-09 PROCEDURE — 82962 GLUCOSE BLOOD TEST: CPT

## 2021-12-09 PROCEDURE — 2700000000 HC OXYGEN THERAPY PER DAY

## 2021-12-09 PROCEDURE — 6370000000 HC RX 637 (ALT 250 FOR IP): Performed by: NURSE PRACTITIONER

## 2021-12-09 PROCEDURE — 80053 COMPREHEN METABOLIC PANEL: CPT

## 2021-12-09 PROCEDURE — 6360000002 HC RX W HCPCS: Performed by: NURSE PRACTITIONER

## 2021-12-09 PROCEDURE — 84145 PROCALCITONIN (PCT): CPT

## 2021-12-09 PROCEDURE — 2580000003 HC RX 258: Performed by: INTERNAL MEDICINE

## 2021-12-09 PROCEDURE — 85378 FIBRIN DEGRADE SEMIQUANT: CPT

## 2021-12-09 RX ORDER — DEXAMETHASONE 2 MG/1
TABLET ORAL
Qty: 18 TABLET | Refills: 0 | Status: SHIPPED | OUTPATIENT
Start: 2021-12-10

## 2021-12-09 RX ADMIN — DEXAMETHASONE SODIUM PHOSPHATE 10 MG: 10 INJECTION INTRAMUSCULAR; INTRAVENOUS at 10:23

## 2021-12-09 RX ADMIN — FERROUS SULFATE TAB 325 MG (65 MG ELEMENTAL FE) 325 MG: 325 (65 FE) TAB at 07:58

## 2021-12-09 RX ADMIN — INSULIN LISPRO 2 UNITS: 100 INJECTION, SOLUTION INTRAVENOUS; SUBCUTANEOUS at 16:48

## 2021-12-09 RX ADMIN — SODIUM CHLORIDE: 9 INJECTION, SOLUTION INTRAVENOUS at 00:30

## 2021-12-09 RX ADMIN — PANTOPRAZOLE SODIUM 40 MG: 40 TABLET, DELAYED RELEASE ORAL at 05:37

## 2021-12-09 RX ADMIN — TAMSULOSIN HYDROCHLORIDE 0.4 MG: 0.4 CAPSULE ORAL at 07:57

## 2021-12-09 RX ADMIN — Medication 500 MG: at 07:58

## 2021-12-09 RX ADMIN — APIXABAN 5 MG: 5 TABLET, FILM COATED ORAL at 08:00

## 2021-12-09 RX ADMIN — FENOFIBRATE 160 MG: 160 TABLET ORAL at 07:58

## 2021-12-09 RX ADMIN — CITALOPRAM HYDROBROMIDE 40 MG: 20 TABLET ORAL at 07:59

## 2021-12-09 RX ADMIN — EZETIMIBE 10 MG: 10 TABLET ORAL at 07:58

## 2021-12-09 RX ADMIN — METOPROLOL SUCCINATE 25 MG: 25 TABLET, FILM COATED, EXTENDED RELEASE ORAL at 07:58

## 2021-12-09 RX ADMIN — RISPERIDONE 0.25 MG: 0.25 TABLET, FILM COATED ORAL at 07:57

## 2021-12-09 RX ADMIN — SODIUM CHLORIDE: 9 INJECTION, SOLUTION INTRAVENOUS at 10:23

## 2021-12-09 RX ADMIN — BARICITINIB 4 MG: 2 TABLET, FILM COATED ORAL at 07:57

## 2021-12-09 RX ADMIN — OLANZAPINE 5 MG: 5 TABLET, FILM COATED ORAL at 07:58

## 2021-12-09 RX ADMIN — FINASTERIDE 5 MG: 5 TABLET, FILM COATED ORAL at 07:58

## 2021-12-09 RX ADMIN — ZINC SULFATE 220 MG (50 MG) CAPSULE 50 MG: CAPSULE at 07:57

## 2021-12-09 RX ADMIN — INSULIN LISPRO 10 UNITS: 100 INJECTION, SOLUTION INTRAVENOUS; SUBCUTANEOUS at 11:57

## 2021-12-09 ASSESSMENT — PAIN SCALES - GENERAL: PAINLEVEL_OUTOF10: 0

## 2021-12-09 NOTE — CARE COORDINATION
[facility] can accept today; to transport via [facility] today with 02; N-N [phone]. left VM with son Mayur Womack re; dishcarge/transfer. Also left facility contact #. Staff advised .  Mili Tatum RN.cm.

## 2021-12-09 NOTE — CARE COORDINATION
Spoke with Dr. Thais Jessica; pt ok to discharge if ok with pulmonary. advised Corrie at [employer] , inquired as to status of prior auth waiver  With [employer]. Communicated via perfect serve with ; re; discharge. Transport forms on chart; will follow. Anusha Jessica.

## 2021-12-09 NOTE — CARE COORDINATION
Spoke to [address] [place] [address] by phone; [facility] pt okay for discharge to [facility].

## 2021-12-09 NOTE — PROGRESS NOTES
Long Lieebrman M.D.,[facility]  Booker Malagon D.O., F.A.C.OWillieI., Jackson Salomon M.D. Fabiana Aguilar M.D. Germain Rodriguez D.O. Daily Pulmonary Progress Note    Patient:  Kyle Tobin 66 y.o. male MRN: [id number]     Date of Service: 12/9/2021      Synopsis     We are following patient for respiratory failure with hypoxia, shortness of breath, covid 19 pneumonia, PE    \"CC\" shortness of breath     Code status: FULL       Subjective      Patient seen and examined. Laying in bed on 4 L nasal cannula without distress. Patient is using his incentive spirometer and working with physical therapy. Patient denies any shortness of breath, chest pain, chest pressure. He still complains of an occasional cough but denies any mucus production. Review of Systems:  Constitutional: Negative for fatigue, chills and fever. HENT: Negative for postnasal drip, rhinorrhea and sore throat. Eyes: Negative for visual disturbance. Respiratory: Positive for cough, denies shortness of breath   Cardiovascular: Negative for chest pain. Gastrointestinal: Negative for abdominal pain, nausea, blood in stool, constipation, nausea and vomiting. Genitourinary: Negative for difficulty urinating, dysuria and urgency. Musculoskeletal: Negative for back pain. Skin: Negative for rash. Neurological:  Negative for dizziness, numbness and headaches.      24-hour events:  None     Objective   Vitals: /67   Pulse 54   Temp 97.6 °F (36.4 °C) (Oral)   Resp 16   Ht 5' 6\" (1.676 m)   Wt 181 lb 6.4 oz (82.3 kg)   SpO2 97%   BMI 29.28 kg/m²     I/O:    Intake/Output Summary (Last 24 hours) at 12/9/2021 1130  Last data filed at 12/9/2021 0421  Gross per 24 hour   Intake 1213 ml   Output 2050 ml   Net -837 ml       Vent Information  SpO2: 97 %      CURRENT MEDS :  Scheduled Meds:   vitamin D  50,000 Units Oral Weekly    apixaban  5 mg Oral BID    insulin lispro  0-12 Units SubCUTAneous TID WC    insulin lispro 0-6 Units SubCUTAneous Nightly    dexamethasone  10 mg IntraVENous Q24H    citalopram  40 mg Oral Daily    ezetimibe  10 mg Oral Daily    fenofibrate  160 mg Oral Daily    ferrous sulfate  325 mg Oral BID    finasteride  5 mg Oral Daily    metoprolol succinate  25 mg Oral Daily    OLANZapine  5 mg Oral Daily    pantoprazole  40 mg Oral QAM AC    risperiDONE  0.25 mg Oral BID    tamsulosin  0.4 mg Oral BID    sodium chloride flush  5-40 mL IntraVENous 2 times per day    ascorbic acid  500 mg Oral Daily    zinc sulfate  50 mg Oral Daily    baricitinib  4 mg Oral Daily       Physical Exam:  General: Lying in bed comfortably, no distress, breathing is not labored  HEENT: PERRL, EOMI, MMM, no oral lesions  Neck: supple, no adenopathy  CV: RRR without murmur  Lungs: clear to auscultation bilaterally without wheezing, no crackles  Abd: soft, NT, ND, bowel sounds normal  Ext: warm, no edema, no clubbing  Skin: no rashes  Neuro: CN II-XII grossly intact, no focal deficits    Pertinent/ New Labs and Imaging Studies     Imaging Personally Reviewed:  Chest x-ray 12/7  Bilateral ground-glass density infiltrates are seen throughout both lungs   more prominent in the left parahilar region extends into the periphery of the   lung and more widely distributed the in the right lung. There is no pleural effusions. Heart is normal size. The mediastinum appears unremarkable. CTA chest/CT abdomen  Impression   CT chest:       Tiny bilateral lower lobe subsegmental pulmonary emboli. No large or central   embolism and no evidence of right heart strain. Bilateral parenchymal infiltrates concerning for multifocal pneumonia,   probably viral.  Localized airspace opacities in the base of the lower lobes   are also likely due to pneumonia but short-term follow-up is recommended to   exclude the unlikely possibility of neoplasia. Scattered mediastinal lymph nodes favored to be reactive.        CT abdomen and pelvis:       Asymmetric thickening of the right rectus abdominus musculature in the   anterior pelvic wall concerning for a rectus sheath hematoma with possible   faint blush of active hemorrhage as detailed above. Other etiologies   including neoplasia thought unlikely. Correlation with clinical presentation   and continued short-term follow-up recommended. Small hiatal hernia. Mild diverticulosis. Other chronic appearing findings in the abdomen and pelvis. Findings were discussed with Dr. Jadiel Rico at approximately 0230 hours [place]   time on 2021. Labs:  Lab Results   Component Value Date    WBC 9.7 2021    HGB 10.5 2021    HCT 31.1 2021    MCV 88.6 2021    MCH 29.9 2021    MCHC 33.8 2021    RDW 13.1 2021     2021    MPV 11.4 2021     Lab Results   Component Value Date     2021    K 4.0 2021    K 3.3 2021     2021    CO2 22 2021    BUN 20 2021    CREATININE 0.6 2021    LABALBU 2.6 2021    CALCIUM 8.3 2021    GFRAA >60 2021    LABGLOM >60 2021     Lab Results   Component Value Date    PROTIME 15.5 2021    INR 1.4 2021     No results for input(s): PROBNP in the last 72 hours. Recent Labs     21  0628   PROCAL 0.04     This SmartLink has not been configured with any valid records.        Micro:  Original date of  testin2021  Vaccination status: Not vaccinated  Current oxygen delivery system: 4L NC  Dexamethasone dosin mg p.o. daily start date: 2021 increased to 10 mg IV twice daily 2021  Remdesivir Y/N:  None   Baricitinib Y/N: 4 mg daily for 14 days started 2021  Tocilizumab Y/N:   None   Respiratory cultures Y/N:   Strep pneumoniae and Legionella Urine Antigen test Y/N: results: None  Antibiotics Y/N with start and stop dates: None  CRP: 27.4> 12.0>21.4-> 4.6  D Dimer: 5925-> 2258  INR 1.4  Procalcitonin: 1.00> 0.43  Ferritin 171  Sed rate: 80  DVT prophylaxis: Lovenox 1 mg/kg twice daily  GI prophylaxis: Protonix   PT/OT: Ordered  Lung recruitment techniques: incentive spirometer     Assessment:    Acute respiratory failure with hypoxia  Moderate COVID-19 pneumonia  Pulmonary embolism provoked by COVID-19 infection  Question of rectus sheath hematoma  Diabetes mellitus type 2  Lymphopenia related to viral infection  Schizophrenia      Plan:   Oxygen therapy 4L high flow cannula wean to keep saturation greater than 88%  Dexamethasone daily dosing x 5 days, taper for discharge  Vitamin therapy   Continue Baricitinib 4mg x14 doses  Eliquis 5 mg po bid maintenance dosing. Monitor for increasing rectus sheath hematoma and for anemia. Any signs of bleeding or anemia will need IVC filter. Increase activity as tolerated, edge of bed   Dimer stable  Trend inflammatory markers  Incentive spirometer, lung recruitment maneuvers, increase activity as tolerated   GI prophylaxis  Okay to discharge to [employer] from a pulmonary point of view when okay with others    This plan of care was reviewed in collaboration with Dr. Olivo Double   Electronically signed by TYSON Williamson CNP on 12/9/2021 at 11:30 AM       I personally saw, examined, and cared for the patient. Labs, medications, radiographs reviewed. I agree with history exam and plans detailed in NP note.         Shanta Mancilla,

## 2021-12-09 NOTE — PROGRESS NOTES
Admit Date: 11/28/2021    Subjective:     Feels fine down to 5 L NC    Objective:     Patient Vitals for the past 8 hrs:   BP Temp Temp src Pulse Resp SpO2   12/09/21 0802 -- -- -- -- -- 97 %   12/09/21 0754 121/67 97.6 °F (36.4 °C) Oral 54 16 98 %     I/O last 3 completed shifts: In: 0910 [P.O.:480; I.V.:913]  Out: 2050 [Urine:2050]  No intake/output data recorded. HEENT: Normal  NECK: Thyroid normal. No carotid bruit. No lymphphadenopathy. CVS: RRR  RS: Clear. No wheeze. No rhonchi. Good airflow bilaterally. ABD: Soft. Non tender. No mass. Normal BS. EXT: No edema. Non tender. Pulses present.    NEURO: no focal deficit       Scheduled Meds:   vitamin D  50,000 Units Oral Weekly    apixaban  5 mg Oral BID    insulin lispro  0-12 Units SubCUTAneous TID WC    insulin lispro  0-6 Units SubCUTAneous Nightly    dexamethasone  10 mg IntraVENous Q24H    citalopram  40 mg Oral Daily    ezetimibe  10 mg Oral Daily    fenofibrate  160 mg Oral Daily    ferrous sulfate  325 mg Oral BID    finasteride  5 mg Oral Daily    metoprolol succinate  25 mg Oral Daily    OLANZapine  5 mg Oral Daily    pantoprazole  40 mg Oral QAM AC    risperiDONE  0.25 mg Oral BID    tamsulosin  0.4 mg Oral BID    sodium chloride flush  5-40 mL IntraVENous 2 times per day    ascorbic acid  500 mg Oral Daily    zinc sulfate  50 mg Oral Daily    baricitinib  4 mg Oral Daily     Continuous Infusions:   sodium chloride 100 mL/hr at 12/09/21 0030    sodium chloride      dextrose         CBC with Differential:    Lab Results   Component Value Date    WBC 9.7 12/07/2021    RBC 3.51 12/07/2021    HGB 10.5 12/07/2021    HCT 31.1 12/07/2021     12/07/2021    MCV 88.6 12/07/2021    MCH 29.9 12/07/2021    MCHC 33.8 12/07/2021    RDW 13.1 12/07/2021    NRBC 0.0 12/05/2021    SEGSPCT 78 08/14/2013    METASPCT 1.0 12/05/2021    LYMPHOPCT 6.1 12/07/2021    MONOPCT 2.9 12/07/2021    MYELOPCT 1.0 12/05/2021    EOSPCT 13.3 06/18/2021 BASOPCT 0.2 12/07/2021    MONOSABS 0.28 12/07/2021    LYMPHSABS 0.59 12/07/2021    EOSABS 0.12 12/07/2021    BASOSABS 0.02 12/07/2021     CMP:    Lab Results   Component Value Date     12/09/2021    K 4.0 12/09/2021    K 3.3 11/28/2021     12/09/2021    CO2 22 12/09/2021    BUN 20 12/09/2021    CREATININE 0.6 12/09/2021    GFRAA >60 12/09/2021    LABGLOM >60 12/09/2021    PROT 5.4 12/09/2021    LABALBU 2.6 12/09/2021    CALCIUM 8.3 12/09/2021    BILITOT 0.4 12/09/2021    ALKPHOS 35 12/09/2021    AST 28 12/09/2021    ALT 40 12/09/2021     PT/INR:    Lab Results   Component Value Date    PROTIME 15.5 11/29/2021    INR 1.4 11/29/2021       Assessment:     Active Problems:    Respiratory failure ([facility] [address] hypoxic due to     Pneumonia due to COVID-19 virus    PE     Type 2 diabetes mellitus     Orthostatic hypotension       Plan:   Stable for transfer to SNF. when precert available

## 2021-12-10 NOTE — DISCHARGE SUMMARY
Discharge Medications    Discharge Medication List as of 12/9/2021  4:45 PMSTART taking these medicationsapixaban (ELIQUIS) 5 MG TABS tabletTake 1 tablet by mouth 2 times daily, Disp-60 tablet, R-3Normaldexamethasone (DECADRON) 2 MG tabletTake 3 tabs daily x3 days then 2 tabs daily x3 days then 1 tab daily x3 days, Disp-18 tablet, R-0Normal    Discharge Medication List as of 12/9/2021  4:45 PM    Discharge Medication List as of 12/9/2021  4:45 PMCONTINUE these medications which have NOT CHANGEDacetaminophen (TYLENOL) 325 MG tabletTake 325 mg by mouth every 6 hours as needed for PainHistorical MedMultiple Vitamin (DAILY-GERMÁN) TABSTake 1 tablet by mouth dailyHistorical MedOLANZapine (ZYPREXA) 5 MG tabletTake 5 mg by mouth dailyHistorical Medezetimibe (ZETIA) 10 MG tabletTake 1 tablet by mouth daily, Disp-90 tablet, R-0Normalvitamin D (ERGOCALCIFEROL) 1.25 MG (10267 UT) CAPS capsuleTake 1 capsule by mouth once a week, Disp-12 capsule, R-0Normalaspirin 81 MG EC tabletTake 81 mg by mouth dailyHistorical Medcitalopram (CELEXA) 40 MG tabletTake 40 mg by mouth dailyHistorical MedrisperiDONE (RISPERDAL) 0.25 MG tabletTake 0.25 mg by mouth daily Historical Medfinasteride (PROSCAR) 5 MG tabletTake 1 tablet by mouth daily, Disp-30 tablet, R-2Normalomeprazole (PRILOSEC) 40 MG delayed release capsuleTake 1 capsule by mouth daily, Disp-90 capsule, R-1Normalferrous sulfate (IRON 325) 325 (65 Fe) MG tabletTake 1 tablet by mouth 2 times daily, Disp-180 tablet, R-1Normalmetoprolol succinate (TOPROL XL) 25 MG extended release tabletTake 1 tablet by mouth daily, Disp-90 tablet, R-1Normalfenofibrate (TRICOR) 145 MG tabletTake 1 tablet by mouth daily, Disp-90 tablet, R-1Normaltamsulosin (FLOMAX) 0.4 MG capsuleTake 1 capsule by mouth 2 times daily, Disp-180 capsule, R-1NormalnitroGLYCERIN (NITROSTAT) 0.3 MG SL tabletup to max of 3 total doses.  If no relief after 1 dose, call 911., Disp-30 tablet, R-0Normal    Discharge Medication List as of 12/9/2021  4:45 PMSTOP taking these medicationsHYDROcodone-acetaminophen (NORCO) 7.5-325 MG per tabletComments:Reason for Stopping:    Time Spent on Discharge:3E] minutes were spent in patient examination, evaluation, counseling as well as medication reconciliation, prescriptions for required medications, discharge plan, and follow up.     Electronically signed by Melody Stanton MD on 12/10/21 at 7:25 AM EST

## 2022-09-01 ENCOUNTER — APPOINTMENT (OUTPATIENT)
Dept: ULTRASOUND IMAGING | Age: 72
End: 2022-09-01
Payer: MEDICARE

## 2022-09-01 ENCOUNTER — HOSPITAL ENCOUNTER (EMERGENCY)
Age: 72
Discharge: HOME | End: 2022-09-01
Attending: EMERGENCY MEDICINE
Payer: MEDICARE

## 2022-09-01 ENCOUNTER — APPOINTMENT (OUTPATIENT)
Dept: GENERAL RADIOLOGY | Age: 72
End: 2022-09-01
Payer: MEDICARE

## 2022-09-01 VITALS
SYSTOLIC BLOOD PRESSURE: 112 MMHG | WEIGHT: 190 LBS | HEART RATE: 72 BPM | RESPIRATION RATE: 18 BRPM | DIASTOLIC BLOOD PRESSURE: 70 MMHG | TEMPERATURE: 99 F | OXYGEN SATURATION: 97 % | BODY MASS INDEX: 28.14 KG/M2 | HEIGHT: 69 IN

## 2022-09-01 DIAGNOSIS — R60.0 BILATERAL LOWER EXTREMITY EDEMA: Primary | ICD-10-CM

## 2022-09-01 LAB
ALBUMIN SERPL-MCNC: 4.5 G/DL (ref 3.5–5.2)
ALP BLD-CCNC: 54 U/L (ref 40–129)
ALT SERPL-CCNC: 19 U/L (ref 0–40)
ANION GAP SERPL CALCULATED.3IONS-SCNC: 12 MMOL/L (ref 7–16)
AST SERPL-CCNC: 22 U/L (ref 0–39)
BACTERIA: NORMAL /HPF
BASOPHILS ABSOLUTE: 0.04 E9/L (ref 0–0.2)
BASOPHILS RELATIVE PERCENT: 0.7 % (ref 0–2)
BILIRUB SERPL-MCNC: 0.3 MG/DL (ref 0–1.2)
BILIRUBIN URINE: NEGATIVE
BLOOD, URINE: NEGATIVE
BUN BLDV-MCNC: 17 MG/DL (ref 6–23)
CALCIUM SERPL-MCNC: 9.6 MG/DL (ref 8.6–10.2)
CHLORIDE BLD-SCNC: 106 MMOL/L (ref 98–107)
CLARITY: CLEAR
CO2: 25 MMOL/L (ref 22–29)
COLOR: YELLOW
CREAT SERPL-MCNC: 0.9 MG/DL (ref 0.7–1.2)
EKG ATRIAL RATE: 67 BPM
EKG P AXIS: 20 DEGREES
EKG P-R INTERVAL: 210 MS
EKG Q-T INTERVAL: 436 MS
EKG QRS DURATION: 98 MS
EKG QTC CALCULATION (BAZETT): 460 MS
EKG R AXIS: 36 DEGREES
EKG T AXIS: 36 DEGREES
EKG VENTRICULAR RATE: 67 BPM
EOSINOPHILS ABSOLUTE: 0.44 E9/L (ref 0.05–0.5)
EOSINOPHILS RELATIVE PERCENT: 7.3 % (ref 0–6)
GFR AFRICAN AMERICAN: >60
GFR NON-AFRICAN AMERICAN: >60 ML/MIN/1.73
GLUCOSE BLD-MCNC: 108 MG/DL (ref 74–99)
GLUCOSE URINE: NEGATIVE MG/DL
HCT VFR BLD CALC: 33.5 % (ref 37–54)
HEMOGLOBIN: 11.2 G/DL (ref 12.5–16.5)
IMMATURE GRANULOCYTES #: 0.02 E9/L
IMMATURE GRANULOCYTES %: 0.3 % (ref 0–5)
INR BLD: 1.1
KETONES, URINE: NEGATIVE MG/DL
LEUKOCYTE ESTERASE, URINE: NEGATIVE
LYMPHOCYTES ABSOLUTE: 1.17 E9/L (ref 1.5–4)
LYMPHOCYTES RELATIVE PERCENT: 19.3 % (ref 20–42)
MAGNESIUM: 1.8 MG/DL (ref 1.6–2.6)
MCH RBC QN AUTO: 30.4 PG (ref 26–35)
MCHC RBC AUTO-ENTMCNC: 33.4 % (ref 32–34.5)
MCV RBC AUTO: 90.8 FL (ref 80–99.9)
MONOCYTES ABSOLUTE: 0.59 E9/L (ref 0.1–0.95)
MONOCYTES RELATIVE PERCENT: 9.8 % (ref 2–12)
NEUTROPHILS ABSOLUTE: 3.79 E9/L (ref 1.8–7.3)
NEUTROPHILS RELATIVE PERCENT: 62.6 % (ref 43–80)
NITRITE, URINE: NEGATIVE
PDW BLD-RTO: 12.7 FL (ref 11.5–15)
PH UA: 6 (ref 5–9)
PLATELET # BLD: 242 E9/L (ref 130–450)
PMV BLD AUTO: 10.3 FL (ref 7–12)
POTASSIUM SERPL-SCNC: 4.3 MMOL/L (ref 3.5–5)
PRO-BNP: 220 PG/ML (ref 0–125)
PROTEIN UA: NEGATIVE MG/DL
PROTHROMBIN TIME: 12 SEC (ref 9.3–12.4)
RBC # BLD: 3.69 E12/L (ref 3.8–5.8)
RBC UA: NORMAL /HPF (ref 0–2)
SODIUM BLD-SCNC: 143 MMOL/L (ref 132–146)
SPECIFIC GRAVITY UA: <=1.005 (ref 1–1.03)
TOTAL PROTEIN: 6.9 G/DL (ref 6.4–8.3)
TROPONIN, HIGH SENSITIVITY: 12 NG/L (ref 0–11)
TROPONIN, HIGH SENSITIVITY: 13 NG/L (ref 0–11)
UROBILINOGEN, URINE: 0.2 E.U./DL
WBC # BLD: 6.1 E9/L (ref 4.5–11.5)
WBC UA: NORMAL /HPF (ref 0–5)

## 2022-09-01 PROCEDURE — 93970 EXTREMITY STUDY: CPT

## 2022-09-01 PROCEDURE — 80053 COMPREHEN METABOLIC PANEL: CPT

## 2022-09-01 PROCEDURE — 83880 ASSAY OF NATRIURETIC PEPTIDE: CPT

## 2022-09-01 PROCEDURE — 83735 ASSAY OF MAGNESIUM: CPT

## 2022-09-01 PROCEDURE — 81001 URINALYSIS AUTO W/SCOPE: CPT

## 2022-09-01 PROCEDURE — 71046 X-RAY EXAM CHEST 2 VIEWS: CPT

## 2022-09-01 PROCEDURE — 85025 COMPLETE CBC W/AUTO DIFF WBC: CPT

## 2022-09-01 PROCEDURE — 93005 ELECTROCARDIOGRAM TRACING: CPT | Performed by: PHYSICIAN ASSISTANT

## 2022-09-01 PROCEDURE — 6360000002 HC RX W HCPCS: Performed by: PHYSICIAN ASSISTANT

## 2022-09-01 PROCEDURE — 99285 EMERGENCY DEPT VISIT HI MDM: CPT

## 2022-09-01 PROCEDURE — 93971 EXTREMITY STUDY: CPT | Performed by: RADIOLOGY

## 2022-09-01 PROCEDURE — 96374 THER/PROPH/DIAG INJ IV PUSH: CPT

## 2022-09-01 PROCEDURE — 84484 ASSAY OF TROPONIN QUANT: CPT

## 2022-09-01 PROCEDURE — 85610 PROTHROMBIN TIME: CPT

## 2022-09-01 RX ORDER — FUROSEMIDE 10 MG/ML
20 INJECTION INTRAMUSCULAR; INTRAVENOUS ONCE
Status: COMPLETED | OUTPATIENT
Start: 2022-09-01 | End: 2022-09-01

## 2022-09-01 RX ADMIN — FUROSEMIDE 20 MG: 10 INJECTION, SOLUTION INTRAMUSCULAR; INTRAVENOUS at 12:10

## 2022-09-01 ASSESSMENT — PAIN - FUNCTIONAL ASSESSMENT: PAIN_FUNCTIONAL_ASSESSMENT: NONE - DENIES PAIN

## 2022-09-01 NOTE — ED NOTES
Robert hose applied to BLE. Patient educated on robert hose application.      Patti Correa RN  09/01/22 [id number]

## 2022-09-01 NOTE — DISCHARGE INSTRUCTIONS
Your entire work-up was found to be negative. Please wear the compression stockings. No evidence of an infection. Your heart was found to be fine. Please call your family doctor to have a follow-up appointment!

## 2022-09-01 NOTE — ED PROVIDER NOTES
ED Attending shared visit  CC: Ju[id number]  Department of Emergency Medicine   ED  Encounter Note  Admit Date/RoomTime: 2022  8:30 AM  ED Room:     NAME: Krys Katz  : 1950  MRN: [id number]     Chief Complaint:  Leg Swelling (Bilateral Lower Extremity edema, denies any Hx CHF, or HTN,is on eliquis because \" his blood is too thick, legs are swollen, red, and painful)    History of Present Illness        Krys Katz is a 66 y.o. old male who presents to the emergency department by private vehicle, for non-traumatic sudden onset of Bilateral calf, shin, ankle, foot pain and swelling, which began 3 week(s) prior to arrival.  Patient has no prior history of pain/injury with regards to today's visit. Since onset the symptoms are persistent and worsening and are mild-moderate in severity. Patient denies any falls or trauma. Patient states she has been trying to sit down and elevate his feet to reduce the swelling which has not helped. Patient states he has radiation of pain from his feet to his knees but denies radiation anywhere else. Nothing seems to make it better or worse. Patient denies any chest pain, shortness of breath, fever, chills, nausea, vomiting, severe abdominal pain, change in bowel or bladder movements. Patient denies any history of congestive heart failure, heart attack, stroke, blood clots. Patient denies smoking, recreational drug abuse or alcohol abuse. Patient denies standing on his legs all the time for work. Patient denies any recent travel, recent surgeries, recent chemotherapy treatments. Associated Signs & Symptoms:     []  Fever     []  Cough     []  Dyspnea     []  Hemoptysis     []  Chest Pain     Wells' Score Criteria for DVT: (possible score ? 2 to 9)      Active cancer (treatment within last 6 months or palliative) NO (0)     Calf swelling ?  3 cm compared to asymptomatic calf (measured 10 cm             below tibial tuberosity) YES (1)     Swollen unilateral superficial veins (non-varicose, in symptomatic leg) NO (0)     Unilateral pitting edema (in symptomatic leg): NO (0)     Previous documented DVT: NO (0)     Swelling of entire leg YES (1)      Localized tenderness along the deep venous system NO (0)     Paralysis, paresis, or recent cast immobilization of lower extremities NO (0)     Recently bedridden ? 3 days, or major surgery requiring regional or                    general anesthetic in the past 12 weeks NO (0)     Alternative diagnosis at least as likely YES (-2)     TOTAL SCORE 0     * Those with Wells scores of two or more have a 28% chance of having DVT, those with a lower score have 6% odds. ** Alternatively, Wells scores can be categorized as high if greater than two, moderate if one or two, and low if less than one, with likelihoods of 53%, 17%, and 5 respectively. ROS   Pertinent positives and negatives are stated within HPI, all other systems reviewed and are negative. Past Medical History:  has a past medical history of Allergic rhinitis, Arthritis, Baker's cyst of knee, CAD (coronary artery disease), Dental caries, Depression, DM (diabetes mellitus) ([address] GERD (gastroesophageal reflux disease), Heart murmur, Hyperlipidemia, Hypertension, Intellectual delay, PONV (postoperative nausea and vomiting), Preoperative clearance, Prostate enlargement, Schizo affective schizophrenia ([address] Seasonal allergies, Sleep apnea, Speech disorder, and Vitamin D deficiency. Surgical History:  has a past surgical history that includes Coronary angioplasty with stent (early 2000's); eye surgery; Appendectomy; cyst removal; knee surgery (2004); Cataract removal with implant (Bilateral, 2014); Coronary angioplasty with stent; Appendectomy; and Tooth Extraction (10/11/2017). Social History:  reports that he has never smoked.  He has never used smokeless tobacco. He reports that he does not drink alcohol and does not use drugs. Family History: family history includes Cancer in his brother; Diabetes in his mother; Heart Disease in his father; Liver Disease in his brother. Allergies: Seasonal    Physical Exam   Oxygen Saturation Interpretation: Normal.        ED Triage Vitals   BP Temp Temp Source Heart Rate Resp SpO2 Height Weight   09/01/22 0828 09/01/22 0820 09/01/22 0820 09/01/22 0820 09/01/22 0828 09/01/22 0820 09/01/22 0839 09/01/22 0839   (!) 134/56 97 °F (36.1 °C) Oral 71 19 98 % 5' 9\" (1.753 m) 190 lb (86.2 kg)         Constitutional:  Alert, development consistent with age. HEENT:  NC/NT. Airway patent. Neck:  Normal ROM. Supple. Respiratory:  Clear to auscultation and breath sounds equal.  CV:  Regular rate and rhythm, normal heart sounds, without pathological murmurs, ectopy, gallops, or rubs. GI:  Abdomen Soft, nontender, good bowel sounds. No firm or pulsatile mass. Lower Ext.:  Bilateral: Lower leg/shin. Tenderness: Mild. Swelling: Severe. Deformity: no.             ROM: full range with pain. Calf:  bilateral, Posterior calf tenderness present. Calf/Ankle edema is present. .            Edema:  2+ and pitting Bilateral lower extremity(s). Skin:  tenderness and swelling. Distal Function:              Motor deficit: none. Sensory deficit: none. Pulse deficit: none. Capillary refill: normal.  Gait: normal  Integument:  Normal turgor. Warm, dry, without visible rash, unless noted elsewhere. Neurological:  Oriented. Motor functions intact.     Lab / Imaging Results   (All laboratory and radiology results have been personally reviewed by myself)  Labs:  Results for orders placed or performed during the hospital encounter of 09/01/22   CBC with Auto Differential   Result Value Ref Range    WBC 6.1 4.5 - 11.5 E9/L    RBC 3.69 (L) 3.80 - 5.80 E12/L    Hemoglobin 11.2 (L) 12.5 - 16.5 g/dL    Hematocrit 33.5 (L) 37.0 - 54.0 %    MCV 90.8 80.0 - 99.9 fL    MCH 30.4 26.0 - 35.0 pg    MCHC 33.4 32.0 - 34.5 %    RDW 12.7 11.5 - 15.0 fL    Platelets 935 922 - 639 E9/L    MPV 10.3 7.0 - 12.0 fL    Neutrophils % 62.6 43.0 - 80.0 %    Immature Granulocytes % 0.3 0.0 - 5.0 %    Lymphocytes % 19.3 (L) 20.0 - 42.0 %    Monocytes % 9.8 2.0 - 12.0 %    Eosinophils % 7.3 (H) 0.0 - 6.0 %    Basophils % 0.7 0.0 - 2.0 %    Neutrophils Absolute 3.79 1.80 - 7.30 E9/L    Immature Granulocytes # 0.02 E9/L    Lymphocytes Absolute 1.17 (L) 1.50 - 4.00 E9/L    Monocytes Absolute 0.59 0.10 - 0.95 E9/L    Eosinophils Absolute 0.44 0.05 - 0.50 E9/L    Basophils Absolute 0.04 0.00 - 0.20 E9/L   Comprehensive Metabolic Panel   Result Value Ref Range    Sodium 143 132 - 146 mmol/L    Potassium 4.3 3.5 - 5.0 mmol/L    Chloride 106 98 - 107 mmol/L    CO2 25 22 - 29 mmol/L    Anion Gap 12 7 - 16 mmol/L    Glucose 108 (H) 74 - 99 mg/dL    BUN 17 6 - 23 mg/dL    Creatinine 0.9 0.7 - 1.2 mg/dL    GFR Non-African American >60 >=60 mL/min/1.73    GFR African American >60     Calcium 9.6 8.6 - 10.2 mg/dL    Total Protein 6.9 6.4 - 8.3 g/dL    Albumin 4.5 3.5 - 5.2 g/dL    Total Bilirubin 0.3 0.0 - 1.2 mg/dL    Alkaline Phosphatase 54 40 - 129 U/L    ALT 19 0 - 40 U/L    AST 22 0 - 39 U/L   Troponin   Result Value Ref Range    Troponin, High Sensitivity 13 (H) 0 - 11 ng/L   Protime-INR   Result Value Ref Range    Protime 12.0 9.3 - 12.4 sec    INR 1.1    Brain Natriuretic Peptide   Result Value Ref Range    Pro- (H) 0 - 125 pg/mL   Magnesium   Result Value Ref Range    Magnesium 1.8 1.6 - 2.6 mg/dL   Troponin   Result Value Ref Range    Troponin, High Sensitivity 12 (H) 0 - 11 ng/L   Urinalysis with Microscopic   Result Value Ref Range    Color, UA Yellow Straw/Yellow    Clarity, UA Clear Clear    Glucose, Ur Negative Negative mg/dL    Bilirubin Urine Negative Negative    Ketones, Urine Negative Negative mg/dL    Specific Gravity, UA <=1.005 1.005 - 1.030    Blood, Urine Negative Negative    pH, UA 6.0 5.0 - 9.0    Protein, UA Negative Negative mg/dL    Urobilinogen, Urine 0.2 <2.0 E.U./dL    Nitrite, Urine Negative Negative    Leukocyte Esterase, Urine Negative Negative   EKG 12 Lead   Result Value Ref Range    Ventricular Rate 67 BPM    Atrial Rate 67 BPM    P-R Interval 210 ms    QRS Duration 98 ms    Q-T Interval 436 ms    QTc Calculation (Bazett) 460 ms    P Axis 20 degrees    R Axis 36 degrees    T Axis 36 degrees       Imaging: All Radiology results interpreted by Radiologist unless otherwise noted. 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 US DUP LOWER EXTREMITIES BILATERAL VENOUS   Final Result   Within the visualized vessels there is no evidence for deep venous   thrombosis                   ED Course / Medical Decision Making     Medications   furosemide (LASIX) injection 20 mg (20 mg IntraVENous Given 9/1/22 1210)          EKG #1:  Interpreted by emergency department attending physician unless otherwise noted. 9/1/22  Time: 0841    Rhythm: normal sinus   Rate: normal  Axis: normal  Conduction: normal  ST Segments: no acute change  T Waves: New T wave inversion  V1    Clinical Impression: non-specific EKG  Comparison to Prior tracings: Some changes noted with T wave inversion Pt is assymptomatic      1300 when assessing patient patient mentions that he \"has some burning with urination. \"  At this time urinalysis added. Patient was educated he will be ordered compression stockings and Lasix was given. Patient was told to follow-up with his PCP. Consult(s):   None    Procedure(s):   None    MDM: Patient is a 78-year-old male that is presenting with bilateral leg edema for the last 3 weeks. Patient states that he has never had any history of congestive heart failure, heart attack or stroke. Patient denies any falls or trauma.   Patient states he has noticed worsening swelling that is now becoming painful and its not improving on its own. Patient did call his family doctor who told him to come in for further evaluation. Patient had full lab work including EKG as well as duplex ultrasound. Imaging was obtained based on moderate suspicion for fracture / bony abnormality, dislocation, retained foreign body, joint effusion, possible DVT as per history/physical findings. No bilateral DVT noted. All lab work was found to be completely unremarkable. Repeat troponin was done and decreased. Patient having no chest pain, shortness of breath, dizziness, palpitations or syncopal episodes. Patient did mention burning with urination therefore urinalysis completed which was found to be negative. Patient was given a dose of Lasix here. After discussion with my attending patient will be discharged home with compression stockings and told to follow-up with his family doctor in regards to the bilateral leg edema. No concern for congestive heart failure at this time. Patient was told to follow closely with his PCP. Patient voiced understanding and agreed with the plan of management. Patient is vitally stable and ready for outpatient follow-up. Patient was explicitly instructed on specific signs and symptoms on which to return to the emergency room for. Patient was instructed to return to the ER for any new or worsening symptoms. Additional discharge instructions were given verbally. All questions were answered. Patient is comfortable and agreeable with discharge plan. Patient in no acute distress and non-toxic in appearance. Plan of Care/Counseling:  Darryl Rico PA-C reviewed today's visit with the patient in addition to providing specific details for the plan of care and counseling regarding the diagnosis and prognosis. Questions are answered at this time and are agreeable with the plan. Assessment      1.  Bilateral lower extremity edema      Plan   Discharged home.  Patient condition is stable    New Medications     New Prescriptions    No medications on file     Electronically signed by Keanu An PA-C   DD: 9/1/22  **This report was transcribed using voice recognition software. Every effort was made to ensure accuracy; however, inadvertent computerized transcription errors may be present.   END OF ED PROVIDER NOTE      Keanu An PA-C  09/01/22 [id number]

## 2022-09-23 ENCOUNTER — APPOINTMENT (OUTPATIENT)
Dept: GENERAL RADIOLOGY | Age: 72
End: 2022-09-23
Payer: MEDICARE

## 2022-09-23 ENCOUNTER — HOSPITAL ENCOUNTER (EMERGENCY)
Age: 72
Discharge: HOME | End: 2022-09-23
Attending: EMERGENCY MEDICINE
Payer: MEDICARE

## 2022-09-23 ENCOUNTER — APPOINTMENT (OUTPATIENT)
Dept: ULTRASOUND IMAGING | Age: 72
End: 2022-09-23
Payer: MEDICARE

## 2022-09-23 VITALS
WEIGHT: 200 LBS | TEMPERATURE: 97.8 F | OXYGEN SATURATION: 98 % | RESPIRATION RATE: 18 BRPM | BODY MASS INDEX: 29.53 KG/M2 | DIASTOLIC BLOOD PRESSURE: 71 MMHG | HEART RATE: 82 BPM | SYSTOLIC BLOOD PRESSURE: 133 MMHG

## 2022-09-23 DIAGNOSIS — R60.0 LOWER EXTREMITY EDEMA: Primary | ICD-10-CM

## 2022-09-23 LAB
ALBUMIN SERPL-MCNC: 4.1 G/DL (ref 3.5–5.2)
ALP BLD-CCNC: 60 U/L (ref 40–129)
ALT SERPL-CCNC: 19 U/L (ref 0–40)
ANION GAP SERPL CALCULATED.3IONS-SCNC: 11 MMOL/L (ref 7–16)
AST SERPL-CCNC: 20 U/L (ref 0–39)
BASOPHILS ABSOLUTE: 0.03 E9/L (ref 0–0.2)
BASOPHILS RELATIVE PERCENT: 0.6 % (ref 0–2)
BILIRUB SERPL-MCNC: <0.2 MG/DL (ref 0–1.2)
BILIRUBIN URINE: NEGATIVE
BLOOD, URINE: NEGATIVE
BUN BLDV-MCNC: 21 MG/DL (ref 6–23)
CALCIUM SERPL-MCNC: 9.5 MG/DL (ref 8.6–10.2)
CHLORIDE BLD-SCNC: 100 MMOL/L (ref 98–107)
CLARITY: CLEAR
CO2: 24 MMOL/L (ref 22–29)
COLOR: YELLOW
CREAT SERPL-MCNC: 0.8 MG/DL (ref 0.7–1.2)
EKG ATRIAL RATE: 67 BPM
EKG P AXIS: 51 DEGREES
EKG P-R INTERVAL: 212 MS
EKG Q-T INTERVAL: 440 MS
EKG QRS DURATION: 98 MS
EKG QTC CALCULATION (BAZETT): 464 MS
EKG R AXIS: 52 DEGREES
EKG T AXIS: 47 DEGREES
EKG VENTRICULAR RATE: 67 BPM
EOSINOPHILS ABSOLUTE: 0.43 E9/L (ref 0.05–0.5)
EOSINOPHILS RELATIVE PERCENT: 8 % (ref 0–6)
GFR AFRICAN AMERICAN: >60
GFR NON-AFRICAN AMERICAN: >60 ML/MIN/1.73
GLUCOSE BLD-MCNC: 143 MG/DL (ref 74–99)
GLUCOSE URINE: NEGATIVE MG/DL
HCT VFR BLD CALC: 33.8 % (ref 37–54)
HEMOGLOBIN: 11.3 G/DL (ref 12.5–16.5)
IMMATURE GRANULOCYTES #: 0.03 E9/L
IMMATURE GRANULOCYTES %: 0.6 % (ref 0–5)
KETONES, URINE: NEGATIVE MG/DL
LEUKOCYTE ESTERASE, URINE: NEGATIVE
LYMPHOCYTES ABSOLUTE: 1.49 E9/L (ref 1.5–4)
LYMPHOCYTES RELATIVE PERCENT: 27.6 % (ref 20–42)
MAGNESIUM: 2 MG/DL (ref 1.6–2.6)
MCH RBC QN AUTO: 30.9 PG (ref 26–35)
MCHC RBC AUTO-ENTMCNC: 33.4 % (ref 32–34.5)
MCV RBC AUTO: 92.3 FL (ref 80–99.9)
MONOCYTES ABSOLUTE: 0.65 E9/L (ref 0.1–0.95)
MONOCYTES RELATIVE PERCENT: 12.1 % (ref 2–12)
NEUTROPHILS ABSOLUTE: 2.76 E9/L (ref 1.8–7.3)
NEUTROPHILS RELATIVE PERCENT: 51.1 % (ref 43–80)
NITRITE, URINE: NEGATIVE
PDW BLD-RTO: 12.7 FL (ref 11.5–15)
PH UA: 6 (ref 5–9)
PLATELET # BLD: 197 E9/L (ref 130–450)
PMV BLD AUTO: 10.5 FL (ref 7–12)
POTASSIUM SERPL-SCNC: 3.8 MMOL/L (ref 3.5–5)
PRO-BNP: 124 PG/ML (ref 0–125)
PROTEIN UA: NEGATIVE MG/DL
RBC # BLD: 3.66 E12/L (ref 3.8–5.8)
SODIUM BLD-SCNC: 135 MMOL/L (ref 132–146)
SPECIFIC GRAVITY UA: >=1.03 (ref 1–1.03)
TOTAL PROTEIN: 6.6 G/DL (ref 6.4–8.3)
TROPONIN, HIGH SENSITIVITY: 12 NG/L (ref 0–11)
TROPONIN, HIGH SENSITIVITY: 13 NG/L (ref 0–11)
UROBILINOGEN, URINE: 0.2 E.U./DL
WBC # BLD: 5.4 E9/L (ref 4.5–11.5)

## 2022-09-23 PROCEDURE — 73610 X-RAY EXAM OF ANKLE: CPT

## 2022-09-23 PROCEDURE — 83735 ASSAY OF MAGNESIUM: CPT

## 2022-09-23 PROCEDURE — 85025 COMPLETE CBC W/AUTO DIFF WBC: CPT

## 2022-09-23 PROCEDURE — 36415 COLL VENOUS BLD VENIPUNCTURE: CPT

## 2022-09-23 PROCEDURE — 84484 ASSAY OF TROPONIN QUANT: CPT

## 2022-09-23 PROCEDURE — 93970 EXTREMITY STUDY: CPT

## 2022-09-23 PROCEDURE — 71046 X-RAY EXAM CHEST 2 VIEWS: CPT

## 2022-09-23 PROCEDURE — 80053 COMPREHEN METABOLIC PANEL: CPT

## 2022-09-23 PROCEDURE — 81003 URINALYSIS AUTO W/O SCOPE: CPT

## 2022-09-23 PROCEDURE — 83880 ASSAY OF NATRIURETIC PEPTIDE: CPT

## 2022-09-23 PROCEDURE — 99285 EMERGENCY DEPT VISIT HI MDM: CPT

## 2022-09-23 PROCEDURE — 93005 ELECTROCARDIOGRAM TRACING: CPT | Performed by: NURSE PRACTITIONER

## 2022-09-23 ASSESSMENT — ENCOUNTER SYMPTOMS
EYE DISCHARGE: 0
SINUS PRESSURE: 0
CHEST TIGHTNESS: 0
ABDOMINAL PAIN: 0
ANAL BLEEDING: 0
SINUS PAIN: 0
NAUSEA: 0
CONSTIPATION: 0
SHORTNESS OF BREATH: 0
VOMITING: 0
RHINORRHEA: 0
ABDOMINAL DISTENTION: 0
BACK PAIN: 0
COUGH: 0
DIARRHEA: 0

## 2022-09-23 ASSESSMENT — PAIN - FUNCTIONAL ASSESSMENT: PAIN_FUNCTIONAL_ASSESSMENT: NONE - DENIES PAIN

## 2022-09-23 NOTE — ED PROVIDER NOTES
HPI     Patient is a 66 y.o. male presents with a chief complaint of leg swelling  This has been occurring for a few weeks. Patient states that it gets better with nothing. Patient states that it gets worse with time. Patient states that it is moderate in severity. Patient states it was gradual in onset. Patient stated that he has developed bilateral lower leg swelling over the past few weeks. Patient has seen his primary care provider for this twice. Stated that he has been taking Bumex that has not improved his symptoms. Patient has a triage note stating that he was mowing the lawn and twisted his ankle. Patient denies this. Patient's wife is also present who states that that is not what they are here for and he has no ankle pain and it is just the swelling in his knees and his legs. Patient denies any chest pain or shortness of breath. Denies any cough. Denies any fevers or chills. Patient stated that when he was in the ER before they did an ultrasound that was negative. Patient has been wearing compression stockings and keeping his legs elevated. Patient is also taken antibiotics for this in the past without improvement  Review of Systems   Constitutional:  Negative for activity change, appetite change, fatigue and fever. HENT:  Negative for congestion, rhinorrhea, sinus pressure and sinus pain. Eyes:  Negative for discharge. Respiratory:  Negative for cough, chest tightness and shortness of breath. Cardiovascular:  Positive for leg swelling. Negative for chest pain and palpitations. Gastrointestinal:  Negative for abdominal distention, abdominal pain, anal bleeding, constipation, diarrhea, nausea and vomiting. Endocrine: Negative for polydipsia and polyuria. Genitourinary:  Negative for decreased urine volume, difficulty urinating, enuresis, flank pain, frequency and urgency. Musculoskeletal:  Negative for arthralgias, back pain and neck stiffness.    Skin:  Negative for rash and wound. Neurological:  Negative for dizziness, weakness, light-headedness and headaches. Psychiatric/Behavioral:  Negative for agitation, behavioral problems and confusion. Physical Exam  Vitals and nursing note reviewed. Constitutional:       Appearance: He is well-developed. HENT:      Head: Normocephalic and atraumatic. Eyes:      Conjunctiva/sclera: Conjunctivae normal.   Cardiovascular:      Rate and Rhythm: Normal rate and regular rhythm. Heart sounds: Normal heart sounds. No murmur heard. Pulmonary:      Effort: Pulmonary effort is normal. No respiratory distress. Breath sounds: Normal breath sounds. No wheezing or rales. Abdominal:      General: Bowel sounds are normal.      Palpations: Abdomen is soft. Tenderness: There is no abdominal tenderness. There is no guarding or rebound. Musculoskeletal:         General: No tenderness or deformity. Cervical back: Normal range of motion and neck supple. Right lower leg: Edema present. Left lower leg: Edema present. Skin:     General: Skin is warm and dry. Neurological:      Mental Status: He is alert and oriented to person, place, and time. Cranial Nerves: No cranial nerve deficit. Coordination: Coordination normal.        Procedures   EKG read interpreted by me. Rate 67 bpm.  Normal axis. Prolonged GA interval.  No ST elevations or depressions. First-degree AV block otherwise normal EKG. Compared to prior EKG with no significant changes. MDM           Patient is a 66 y.o. male presenting with leg swelling. Patient has good pulses in the leg. Patient has bilateral lower extremity edema. Patient's EKG is benign. Patient had ultrasound of the bilateral lower extremities are negative. Patient has not normal vitals. Patient clinically appears well. Patient's oxygen status is normal.  Patient's chest x-ray was clear. Patient be discharged home.   Patient will be discharged home with compression stockings. Patient was given return precautions. Labs were interpreted by me. Patient will follow up with their primary care provider. Patient is agreeable to this plan. Patient has remained stable throughout their stay in the ED. Patient was seen and evaluated by myself and my attending Macho Stroud MD. Assessment and Plan discussed with attending provider, please see attestation for final plan of care. This note was done using dictation software and there may be some grammatical errors associated with this. Richard Bazzi MD            --------------------------------------------- PAST HISTORY ---------------------------------------------  Past Medical History:  has a past medical history of Allergic rhinitis, Arthritis, Baker's cyst of knee, CAD (coronary artery disease), Dental caries, Depression, DM (diabetes mellitus) ([facility] [address] GERD (gastroesophageal reflux disease), Heart murmur, Hyperlipidemia, Hypertension, Intellectual delay, PONV (postoperative nausea and vomiting), Preoperative clearance, Prostate enlargement, Schizo affective schizophrenia ([facility] [address] Seasonal allergies, Sleep apnea, Speech disorder, and Vitamin D deficiency. Past Surgical History:  has a past surgical history that includes Coronary angioplasty with stent (early 2000's); eye surgery; Appendectomy; cyst removal; knee surgery (2004); Cataract removal with implant (Bilateral, 2014); Coronary angioplasty with stent; Appendectomy; and Tooth Extraction (10/11/2017). Social History:  reports that he has never smoked. He has never used smokeless tobacco. He reports that he does not drink alcohol and does not use drugs. Family History: family history includes Cancer in his brother; Diabetes in his mother; Heart Disease in his father; Liver Disease in his brother. The patients home medications have been reviewed.     Allergies: Seasonal    -------------------------------------------------- RESULTS -------------------------------------------------  Labs:  Results for orders placed or performed during the hospital encounter of 09/23/22   Troponin   Result Value Ref Range    Troponin, High Sensitivity 12 (H) 0 - 11 ng/L   CBC with Auto Differential   Result Value Ref Range    WBC 5.4 4.5 - 11.5 E9/L    RBC 3.66 (L) 3.80 - 5.80 E12/L    Hemoglobin 11.3 (L) 12.5 - 16.5 g/dL    Hematocrit 33.8 (L) 37.0 - 54.0 %    MCV 92.3 80.0 - 99.9 fL    MCH 30.9 26.0 - 35.0 pg    MCHC 33.4 32.0 - 34.5 %    RDW 12.7 11.5 - 15.0 fL    Platelets 933 937 - 654 E9/L    MPV 10.5 7.0 - 12.0 fL    Neutrophils % 51.1 43.0 - 80.0 %    Immature Granulocytes % 0.6 0.0 - 5.0 %    Lymphocytes % 27.6 20.0 - 42.0 %    Monocytes % 12.1 (H) 2.0 - 12.0 %    Eosinophils % 8.0 (H) 0.0 - 6.0 %    Basophils % 0.6 0.0 - 2.0 %    Neutrophils Absolute 2.76 1.80 - 7.30 E9/L    Immature Granulocytes # 0.03 E9/L    Lymphocytes Absolute 1.49 (L) 1.50 - 4.00 E9/L    Monocytes Absolute 0.65 0.10 - 0.95 E9/L    Eosinophils Absolute 0.43 0.05 - 0.50 E9/L    Basophils Absolute 0.03 0.00 - 0.20 E9/L   Comprehensive Metabolic Panel   Result Value Ref Range    Sodium 135 132 - 146 mmol/L    Potassium 3.8 3.5 - 5.0 mmol/L    Chloride 100 98 - 107 mmol/L    CO2 24 22 - 29 mmol/L    Anion Gap 11 7 - 16 mmol/L    Glucose 143 (H) 74 - 99 mg/dL    BUN 21 6 - 23 mg/dL    Creatinine 0.8 0.7 - 1.2 mg/dL    GFR Non-African American >60 >=60 mL/min/1.73    GFR African American >60     Calcium 9.5 8.6 - 10.2 mg/dL    Total Protein 6.6 6.4 - 8.3 g/dL    Albumin 4.1 3.5 - 5.2 g/dL    Total Bilirubin <0.2 0.0 - 1.2 mg/dL    Alkaline Phosphatase 60 40 - 129 U/L    ALT 19 0 - 40 U/L    AST 20 0 - 39 U/L   Brain Natriuretic Peptide   Result Value Ref Range    Pro- 0 - 125 pg/mL   Magnesium   Result Value Ref Range    Magnesium 2.0 1.6 - 2.6 mg/dL   Urinalysis   Result Value Ref Range    Color, UA Yellow Straw/Yellow    Clarity, UA Clear Clear    Glucose, Ur Negative Negative mg/dL    Bilirubin Urine Negative Negative    Ketones, Urine Negative Negative mg/dL    Specific Gravity, UA >=1.030 1.005 - 1.030    Blood, Urine Negative Negative    pH, UA 6.0 5.0 - 9.0    Protein, UA Negative Negative mg/dL    Urobilinogen, Urine 0.2 <2.0 E.U./dL    Nitrite, Urine Negative Negative    Leukocyte Esterase, Urine Negative Negative   Troponin   Result Value Ref Range    Troponin, High Sensitivity 13 (H) 0 - 11 ng/L   EKG 12 Lead   Result Value Ref Range    Ventricular Rate 67 BPM    Atrial Rate 67 BPM    P-R Interval 212 ms    QRS Duration 98 ms    Q-T Interval 440 ms    QTc Calculation (Bazett) 464 ms    P Axis 51 degrees    R Axis 52 degrees    T Axis 47 degrees       Radiology:  US DUP LOWER EXTREMITIES BILATERAL VENOUS   Final Result   No evidence of DVT in either lower extremity. XR ANKLE LEFT (MIN 3 VIEWS)   Final Result   No acute abnormality of the ankle. XR ANKLE RIGHT (MIN 3 VIEWS)   Final Result   No acute abnormality of the ankle. XR CHEST (2 VW)   Final Result   1. There is no acute cardiopulmonary disease   2. Mild chronic pleuroparenchymal scarring seen within the right and left   upper lobes. ------------------------- NURSING NOTES AND VITALS REVIEWED ---------------------------  Date / Time Roomed:  9/23/2022 10:41 AM  ED Bed Assignment:  15/15    The nursing notes within the ED encounter and vital signs as below have been reviewed. /71   Pulse 82   Temp 97.8 °F (36.6 °C) (Oral)   Resp 18   Wt 200 lb (90.7 kg)   SpO2 98%   BMI 29.53 kg/m²   Oxygen Saturation Interpretation: Normal      ------------------------------------------ PROGRESS NOTES ------------------------------------------  3:44 PM EDT  I have spoken with the patient and family if present and discussed todays results, in addition to providing specific details for the plan of care and counseling regarding the diagnosis and prognosis.   Their questions are answered at this time and they are agreeable with the plan. I discussed at length with them reasons for immediate return here for re evaluation. They will followup with their primary care provider by calling their office as soon as possible.      --------------------------------- ADDITIONAL PROVIDER NOTES ---------------------------------  At this time the patient is without objective evidence of an acute process requiring hospitalization or inpatient management. They have remained hemodynamically stable throughout their entire ED visit and are stable for discharge with outpatient follow-up. The plan has been discussed in detail and they are aware of the specific conditions for emergent return, as well as the importance of follow-up. New Prescriptions    COMPRESSION STOCKINGS MISC    by Does not apply route       Diagnosis:  1. Lower extremity edema        Disposition:  Patient's disposition: Discharge to home  Patient's condition is stable.          Tanika Kirkpatrick MD  Resident  09/23/22 [id number]

## 2022-09-23 NOTE — ED NOTES
Department of Emergency Medicine  FIRST PROVIDER TRIAGE NOTE             Independent MLP           9/23/22  8:30 AM EDT    Date of Encounter: 9/23/22   MRN: [id number]      HPI: Tai Calderon is a 66 y.o. male who presents to the ED for Ankle Pain (PT STATES THAT HE WAS MOWING THE LAWN AND REPORTS THAT HE TWISTED HIS ANKLE. STATES THAT LEFT ANKLE IS SWOLLEN ALONG WITH KNEE WELLING BILATERAL. ) and Edema (PITTING EDEMA NOTED UPON TRIAGE. )  Patient presents with complaints of swelling to the lower extremities which he states has been present for a month however is present progressively worsening. He denies any recent falls or injuries but states he did have a fall approxi-1 month ago. He does have 3+ pitting edema to the lower extremities. Denies any shortness of breath. Denies any chest pain. He is on Eliquis for history of atrial fibrillation. ROS: Negative for cp or sob. PE: CV: regular rate     Initial Plan of Care: All treatment areas with department are currently occupied. Plan to order/Initiate the following while awaiting opening in ED: labs, EKG, and imaging studies.   Initiate Treatment-Testing, Proceed toTreatment Area When Bed Available for ED Attending/MLP to Continue Care    Electronically signed by TYSON Eastman CNP   DD: 9/23/22       TYSON Pittman CNP  09/23/22 0343

## 2022-09-26 ENCOUNTER — TELEPHONE (OUTPATIENT)
Dept: VASCULAR SURGERY | Age: 72
End: 2022-09-26

## 2022-10-12 ENCOUNTER — TELEPHONE (OUTPATIENT)
Dept: VASCULAR SURGERY | Age: 72
End: 2022-10-12

## 2022-10-13 ENCOUNTER — OFFICE VISIT (OUTPATIENT)
Dept: VASCULAR SURGERY | Age: 72
End: 2022-10-13
Payer: MEDICARE

## 2022-10-13 VITALS — WEIGHT: 200 LBS | HEIGHT: 72 IN | BODY MASS INDEX: 27.09 KG/M2

## 2022-10-13 DIAGNOSIS — I89.0 LYMPHEDEMA OF BOTH LOWER EXTREMITIES: ICD-10-CM

## 2022-10-13 DIAGNOSIS — M79.89 LEG SWELLING: ICD-10-CM

## 2022-10-13 PROBLEM — J96.90 RESPIRATORY FAILURE (HCC): Status: RESOLVED | Noted: 2021-11-29 | Resolved: 2022-10-13

## 2022-10-13 PROCEDURE — 99204 OFFICE O/P NEW MOD 45 MIN: CPT | Performed by: SURGERY

## 2022-10-13 PROCEDURE — 1123F ACP DISCUSS/DSCN MKR DOCD: CPT | Performed by: SURGERY

## 2022-10-13 RX ORDER — BUMETANIDE 1 MG/1
1 TABLET ORAL DAILY
COMMUNITY
End: 2022-10-25

## 2022-10-13 RX ORDER — HYDROCODONE BITARTRATE AND ACETAMINOPHEN 5; 325 MG/1; MG/1
1 TABLET ORAL EVERY 6 HOURS PRN
COMMUNITY

## 2022-10-13 NOTE — PROGRESS NOTES
Chief Complaint:   Chief Complaint   Patient presents with    Circulatory Problem     New pt.bilateral lower extremities are swollen         HPI: Patient came to the office with his wife, for the evaluation of vascular status of both legs, had swelling of legs, recently went to the emergency room, did undergo venous ultrasound study on 2 different occasions, in September, no DVT, patient received some diuretics, did help him, spends most of his time sitting with his legs in dependent position    He does have some cardiac issues, but has not seen his cardiologist for a while    Patient's wife tells me that he was told that he might have lymphedema    Patient is currently on Eliquis, does not recall why, thinks may be from the heart issue      Patient denies any focal lateralizing neurological symptoms like loss of speech, vision or loss of function of extremity    Patient can walk short distance up to 1 block at a time slowly, and denies any symptoms of rest pain    Allergies   Allergen Reactions    Seasonal        Current Outpatient Medications   Medication Sig Dispense Refill    bumetanide (BUMEX) 1 MG tablet Take 1 mg by mouth daily      HYDROcodone-acetaminophen (NORCO) 5-325 MG per tablet Take 1 tablet by mouth every 6 hours as needed for Pain.       Compression Stockings MISC by Does not apply route 1 each 0    Multiple Vitamin (DAILY-GERMÁN) TABS Take 1 tablet by mouth daily 30 tablet 3    ezetimibe (ZETIA) 10 MG tablet Take 1 tablet by mouth daily 90 tablet 0    risperiDONE (RISPERDAL) 0.25 MG tablet Take 1 tablet by mouth daily 90 tablet 0    omeprazole (PRILOSEC) 40 MG delayed release capsule Take 1 capsule by mouth daily 90 capsule 1    citalopram (CELEXA) 40 MG tablet Take 1 tablet by mouth daily 90 tablet 0    aspirin 81 MG EC tablet Take 1 tablet by mouth daily 90 tablet 0    apixaban (ELIQUIS) 5 MG TABS tablet Take 1 tablet by mouth 2 times daily 60 tablet 1    vitamin D (ERGOCALCIFEROL) 1.25 MG (17484 UT) CAPS capsule Take 1 capsule by mouth once a week 12 capsule 0    tamsulosin (FLOMAX) 0.4 MG capsule Take 1 capsule by mouth 2 times daily 180 capsule 1    OLANZapine (ZYPREXA) 5 MG tablet Take 1 tablet by mouth daily 30 tablet 0    dexamethasone (DECADRON) 2 MG tablet Take 3 tabs daily x3 days then 2 tabs daily x3 days then 1 tab daily x3 days 18 tablet 0    acetaminophen (TYLENOL) 325 MG tablet Take 325 mg by mouth every 6 hours as needed for Pain      finasteride (PROSCAR) 5 MG tablet Take 1 tablet by mouth daily 30 tablet 2    ferrous sulfate (IRON 325) 325 (65 Fe) MG tablet Take 1 tablet by mouth 2 times daily (Patient taking differently: Take 325 mg by mouth 2 times daily (with meals)) 180 tablet 1    metoprolol succinate (TOPROL XL) 25 MG extended release tablet Take 1 tablet by mouth daily 90 tablet 1    fenofibrate (TRICOR) 145 MG tablet Take 1 tablet by mouth daily 90 tablet 1    nitroGLYCERIN (NITROSTAT) 0.3 MG SL tablet up to max of 3 total doses. If no relief after 1 dose, call 911. 30 tablet 0     No current facility-administered medications for this visit.        Past Medical History:   Diagnosis Date    Allergic rhinitis     Arthritis     Baker's cyst of knee     right    CAD (coronary artery disease)     follows with Dr. Margarita Jade caries     Depression     DM (diabetes mellitus) ([facility] [address]     GERD (gastroesophageal reflux disease)     Heart murmur     Hyperlipidemia     Hypertension     Intellectual delay     able to write name, limited reading / signs for self    Leg swelling     Lymphedema of both lower extremities 10/13/2022    PONV (postoperative nausea and vomiting)     Preoperative clearance 08/01/2017    cardiac clearance from Dr. Lakesha Begum in [facility] notes    Prostate enlargement     Schizo affective schizophrenia ([facility] [address]     stable    Seasonal allergies     Sleep apnea     no use cpap    Speech disorder     since birth    Vitamin D deficiency        Past Surgical History:   Procedure Laterality Date    APPENDECTOMY      APPENDECTOMY      CATARACT REMOVAL WITH IMPLANT Bilateral 2014    CORONARY ANGIOPLASTY WITH STENT PLACEMENT  early 2000's    CORONARY ANGIOPLASTY WITH STENT PLACEMENT      CYST REMOVAL      On top of his head    EYE SURGERY      Child- [address]    KNEE SURGERY  2004    Right Knee-orthoscopic surgery    TOOTH EXTRACTION  10/11/2017    full mouth extractions       Family History   Problem Relation Age of Onset    Diabetes Mother     Heart Disease Father     Cancer Brother     Liver Disease Brother        Social History     Socioeconomic History    Marital status:      Spouse name: Not on file    Number of children: Not on file    Years of education: Not on file    Highest education level: Not on file   Occupational History    Occupation: maintenance   Tobacco Use    Smoking status: Never    Smokeless tobacco: Never   Vaping Use    Vaping Use: Never used   Substance and Sexual Activity    Alcohol use: No    Drug use: No    Sexual activity: Not on file   Other Topics Concern    Not on file   Social History Narrative    ** Merged History Encounter **          Social Determinants of Health     Financial Resource Strain: Not on file   Food Insecurity: Not on file   Transportation Needs: Not on file   Physical Activity: Not on file   Stress: Not on file   Social Connections: Not on file   Intimate Partner Violence: Not on file   Housing Stability: Not on file       Review of Systems:  Skin:  No abnormal pigmentation or rash  Eyes:  No blurring, diplopia or vision loss  Ears/Nose/Throat:  No hearing loss or vertigo  Respiratory:  No cough, pleuritic chest pain, dyspnea, or wheezing. Cardiovascular: No angina, palpitations . Coronary artery disease, coronary artery angioplasty and stent placement hypertension, hyperlipidemia  Gastrointestinal:  No nausea or vomiting; no abdominal pain or rectal bleeding. GERD  Musculoskeletal:  No arthritis or weakness.   Neurologic:  No paralysis, paresis, paresthesia, seizures or headaches  Hematologic/Lymphatic/Immunologic:  No anemia, abnormal bleeding/bruising, fever, chills or night sweats. Endocrine:  No heat or cold intolerance. No polyphagia, polydipsia or polyuria. History of elevated blood glucose level      Physical Exam:  General appearance:  Alert, awake, oriented x 3. No distress. Skin:  Warm and dry  Head:  Normocephalic. No masses, lesions or tenderness  Eyes:  Conjunctivae appear normal; PERRL  Ears:  External ears normal  Nose/Sinuses:  Septum midline, mucosa normal; no drainage  Oropharynx:  Clear, no exudate noted  Neck:  No jugular venous distention, lymphadenopathy or thyromegaly. No evidence of carotid bruit  Lungs:  Clear to ausculation bilaterally. No rhonchi, crackles, wheezes  Heart:  Regular rate and rhythm. No rub or murmur  Abdomen:  Soft, non-tender. No masses, organomegaly. Musculoskeletal : No joint effusions, tenderness swelling    Neuro: Speech is intact. Moving all extremities. No focal motor or sensory deficits      Extremities:  Both feet are warm to touch. The color of both feet is normal.    Swelling of both legs, moderate severity noted bilaterally clinically consistent lymphedema    Pulses Right  Left    Brachial 3 3    Radial    3=normal   Femoral 2 2  2=diminished   Popliteal    1=barely palpable   Dorsalis pedis 3 3  0=absent   Posterior tibial    4=aneurysmal             Other pertinent information:1. The past medical records were reviewed. 2.  2 venous ultrasound studies done in September, normal, no DVT    3.  CMP, normal albumin level    Assessment:    1. Leg swelling    2.  Lymphedema of both lower extremities              Plan:       Discussed the patient's wife, options, risks benefits explained, informed him, 2 venous ultrasound studies done in September, normal, clinically he has good pulses, recommend him lymphedema therapy once maximum improvement is noted, have the legs measured for compression device and call me as needed if any increased pain or swelling of the legs in the future    Patient and family were recommended to make sure that you do see the cardiologist at [facility] to make sure there is no underlying congestive heart failure issues     All the questions were answered.       Orders Placed This Encounter   Procedures    PT lymphedema evaluation and treatment             Indicated follow-up: Call as needed

## 2022-10-18 ENCOUNTER — TELEPHONE (OUTPATIENT)
Dept: ADMINISTRATIVE | Age: 72
End: 2022-10-18

## 2022-10-18 NOTE — TELEPHONE ENCOUNTER
NP calling to schedule with [facility] - re-establish; referral in the chart    Patient Appointment Form:      PCP: Dr. Erasmo Luna  Referring: Dr. Erasmo Luna    Has the Patient:    Seen a Cardiologist? Yes 2019 Joanna Benito     Had a heart catheterization? no    Had heart surgery? no    Had a stress test or nuclear stress test? Yes 2019 and 2012 Epic     Had an echocardiogram? Yes 2012 Epic     Had a vascular ultrasound? Yes 9/23/22 Epic     Had a 24/48 heart monitor or extended cardiac event monitor? no    Had recent blood work in the last 6 months?  Yes PCP Epic     Had a pacemaker/ICD/ILR implant? no    Seen an Electrophysiologist? no        Will send records via: 2019 [address] [place] [address] in [address] [place] [address] in [address] [place] [address]       Date & time of appointment:  10/25/22 @ 9:00 with \"Dr. Moshe Kerr"

## 2022-10-25 ENCOUNTER — OFFICE VISIT (OUTPATIENT)
Dept: CARDIOLOGY CLINIC | Age: 72
End: 2022-10-25
Payer: MEDICARE

## 2022-10-25 VITALS
BODY MASS INDEX: 38.28 KG/M2 | HEART RATE: 71 BPM | DIASTOLIC BLOOD PRESSURE: 76 MMHG | HEIGHT: 66 IN | WEIGHT: 238.2 LBS | SYSTOLIC BLOOD PRESSURE: 124 MMHG

## 2022-10-25 DIAGNOSIS — I50.9 CONGESTIVE HEART FAILURE, UNSPECIFIED HF CHRONICITY, UNSPECIFIED HEART FAILURE TYPE (HCC): ICD-10-CM

## 2022-10-25 DIAGNOSIS — I25.10 CORONARY ARTERY DISEASE INVOLVING NATIVE CORONARY ARTERY OF NATIVE HEART WITHOUT ANGINA PECTORIS: Primary | ICD-10-CM

## 2022-10-25 PROCEDURE — 3017F COLORECTAL CA SCREEN DOC REV: CPT | Performed by: INTERNAL MEDICINE

## 2022-10-25 PROCEDURE — 1036F TOBACCO NON-USER: CPT | Performed by: INTERNAL MEDICINE

## 2022-10-25 PROCEDURE — G8427 DOCREV CUR MEDS BY ELIG CLIN: HCPCS | Performed by: INTERNAL MEDICINE

## 2022-10-25 PROCEDURE — G8484 FLU IMMUNIZE NO ADMIN: HCPCS | Performed by: INTERNAL MEDICINE

## 2022-10-25 PROCEDURE — 93000 ELECTROCARDIOGRAM COMPLETE: CPT | Performed by: INTERNAL MEDICINE

## 2022-10-25 PROCEDURE — G8417 CALC BMI ABV UP PARAM F/U: HCPCS | Performed by: INTERNAL MEDICINE

## 2022-10-25 PROCEDURE — 1123F ACP DISCUSS/DSCN MKR DOCD: CPT | Performed by: INTERNAL MEDICINE

## 2022-10-25 PROCEDURE — 99204 OFFICE O/P NEW MOD 45 MIN: CPT | Performed by: INTERNAL MEDICINE

## 2022-10-25 RX ORDER — SPIRONOLACTONE 25 MG/1
25 TABLET ORAL DAILY
Qty: 90 TABLET | Refills: 3 | Status: SHIPPED | OUTPATIENT
Start: 2022-10-25

## 2022-10-25 RX ORDER — BUMETANIDE 2 MG/1
2 TABLET ORAL DAILY
Qty: 90 TABLET | Refills: 3 | Status: SHIPPED | OUTPATIENT
Start: 2022-10-25

## 2022-10-25 NOTE — PROGRESS NOTES
CHIEF COMPLAINT: Edema/DIAS/CAD    HPI: Patient is a 74 y.o. male seen at the request of Cici Agosto DO. Patient with history of CAD having had BMS to RCA 12/03. Patient seen in follow up. Some DIAS. No CP. Increased edema. Past Medical History:   Diagnosis Date    Allergic rhinitis     Arthritis     Baker's cyst of knee     right    CAD (coronary artery disease)     follows with Dr. Eloy Benedict caries     Depression     DM (diabetes mellitus) ([address]     GERD (gastroesophageal reflux disease)     Heart murmur     Hyperlipidemia     Hypertension     Intellectual delay     able to write name, limited reading / signs for self    Leg swelling     Lymphedema of both lower extremities 10/13/2022    PONV (postoperative nausea and vomiting)     Preoperative clearance 08/01/2017    cardiac clearance from Dr. Keon Nicholas in [facility] notes    Prostate enlargement     Schizo affective schizophrenia ([address]     stable    Seasonal allergies     Sleep apnea     no use cpap    Speech disorder     since birth    Vitamin D deficiency        Patient Active Problem List   Diagnosis    CAD (coronary artery disease)    Hypogonadism male    Hyperlipemia    Primary osteoarthritis of left knee    Baker's cyst of knee, right    Primary osteoarthritis of right knee    Baker's cyst of knee, left    Vitamin D deficiency    Pneumonia due to COVID-19 virus    Uncontrolled type 2 diabetes mellitus with hyperglycemia ([address]    Controlled type 2 diabetes mellitus without complication ([facility])    Orthostatic hypotension    Leg swelling    Lymphedema of both lower extremities       Allergies   Allergen Reactions    Seasonal        Current Outpatient Medications   Medication Sig Dispense Refill    HYDROcodone-acetaminophen (NORCO) 5-325 MG per tablet Take 1 tablet by mouth every 6 hours as needed for Pain.       Multiple Vitamin (DAILY-GERMÁN) TABS Take 1 tablet by mouth daily 30 tablet 3    ezetimibe (ZETIA) 10 MG tablet Take 1 tablet by mouth daily 90 tablet 0    risperiDONE (RISPERDAL) 0.25 MG tablet Take 1 tablet by mouth daily 90 tablet 0    omeprazole (PRILOSEC) 40 MG delayed release capsule Take 1 capsule by mouth daily 90 capsule 1    citalopram (CELEXA) 40 MG tablet Take 1 tablet by mouth daily 90 tablet 0    aspirin 81 MG EC tablet Take 1 tablet by mouth daily 90 tablet 0    apixaban (ELIQUIS) 5 MG TABS tablet Take 1 tablet by mouth 2 times daily 60 tablet 1    vitamin D (ERGOCALCIFEROL) 1.25 MG (13894 UT) CAPS capsule Take 1 capsule by mouth once a week 12 capsule 0    tamsulosin (FLOMAX) 0.4 MG capsule Take 1 capsule by mouth 2 times daily 180 capsule 1    OLANZapine (ZYPREXA) 5 MG tablet Take 1 tablet by mouth daily 30 tablet 0    acetaminophen (TYLENOL) 325 MG tablet Take 325 mg by mouth every 6 hours as needed for Pain      finasteride (PROSCAR) 5 MG tablet Take 1 tablet by mouth daily 30 tablet 2    ferrous sulfate (IRON 325) 325 (65 Fe) MG tablet Take 1 tablet by mouth 2 times daily (Patient taking differently: Take 325 mg by mouth 2 times daily (with meals)) 180 tablet 1    metoprolol succinate (TOPROL XL) 25 MG extended release tablet Take 1 tablet by mouth daily 90 tablet 1    fenofibrate (TRICOR) 145 MG tablet Take 1 tablet by mouth daily 90 tablet 1    nitroGLYCERIN (NITROSTAT) 0.3 MG SL tablet up to max of 3 total doses. If no relief after 1 dose, call 911. 30 tablet 0    bumetanide (BUMEX) 1 MG tablet Take 1 mg by mouth daily (Patient not taking: Reported on 10/25/2022)      Compression Stockings MISC by Does not apply route 1 each 0    dexamethasone (DECADRON) 2 MG tablet Take 3 tabs daily x3 days then 2 tabs daily x3 days then 1 tab daily x3 days (Patient not taking: Reported on 10/25/2022) 18 tablet 0     No current facility-administered medications for this visit.        Social History     Socioeconomic History    Marital status:      Spouse name: Not on file    Number of children: Not on file    Years of education: Not on file    Highest education level: Not on file   Occupational History    Occupation: maintenance   Tobacco Use    Smoking status: Never    Smokeless tobacco: Never   Vaping Use    Vaping Use: Never used   Substance and Sexual Activity    Alcohol use: No    Drug use: No    Sexual activity: Not on file   Other Topics Concern    Not on file   Social History Narrative    ** Merged History Encounter **          Social Determinants of Health     Financial Resource Strain: Not on file   Food Insecurity: Not on file   Transportation Needs: Not on file   Physical Activity: Not on file   Stress: Not on file   Social Connections: Not on file   Intimate Partner Violence: Not on file   Housing Stability: Not on file       Family History   Problem Relation Age of Onset    Diabetes Mother     Heart Disease Father     Cancer Brother     Liver Disease Brother      Review of Systems:  Heart: as above   Lungs: as above   Eyes: denies changes in vision or discharge. Ears: denies changes in hearing or pain. Nose: denies epistaxis or masses   Throat: denies sore throat or trouble swallowing. Neuro: denies numbness, tingling, tremors. Skin: denies rashes or itching. : denies hematuria, dysuria   GI: denies vomiting, diarrhea   Psych: denies mood changed, anxiety, depression. all others negative. Physical Exam   /76   Pulse 71   Ht 5' 6\" (1.676 m)   Wt 238 lb 3.2 oz (108 kg)   BMI 38.45 kg/m²   Constitutional: Oriented to person, place, and time. Well-developed and well-nourished. No distress. Head: Normocephalic and atraumatic. Eyes: EOM are normal. Pupils are equal, round, and reactive to light. Neck: Normal range of motion. Neck supple. No hepatojugular reflux and no JVD present. Carotid bruit is not present. No tracheal deviation present. No thyromegaly present. Cardiovascular: Normal rate, regular rhythm, normal heart sounds and intact distal pulses. Exam reveals no gallop and no friction rub. No murmur heard. Pulmonary/Chest: Effort normal and breath sounds normal. No respiratory distress. No wheezes. No rales. No tenderness. Abdominal: Soft. Bowel sounds are normal. No distension and no mass. No tenderness. No rebound and no guarding. Musculoskeletal: Normal range of motion. No edema and no tenderness. Lymphadenopathy:   No cervical adenopathy. No groin adenopathy. Neurological: Alert and oriented to person, place, and time. Skin: Skin is warm and dry. No rash noted. Not diaphoretic. No erythema. Psychiatric: Normal mood and affect. Behavior is normal.     EKG:  sinus rhythm, PAC's, nonspecific ST and T waves changes. ASSESSMENT AND PLAN:  Patient Active Problem List   Diagnosis    CAD (coronary artery disease)    Hypogonadism male    Hyperlipemia    Primary osteoarthritis of left knee    Baker's cyst of knee, right    Primary osteoarthritis of right knee    Baker's cyst of knee, left    Vitamin D deficiency    Pneumonia due to COVID-19 virus    Uncontrolled type 2 diabetes mellitus with hyperglycemia ([facility] [address]    Controlled type 2 diabetes mellitus without complication (HCC)    Orthostatic hypotension    Leg swelling    Lymphedema of both lower extremities     1. DIAS/Edema:    Echo. Increase bumex/ Add aldactone. 2. Hx of CAD:    Continue ASA/BB/statin. 3. Lipids: Statin. 4. DM: Per PCP. Alcides Hernandez D.O.   Cardiologist  Cardiology, [facility]

## 2022-11-07 ENCOUNTER — TELEPHONE (OUTPATIENT)
Dept: CARDIOLOGY | Age: 72
End: 2022-11-07

## 2022-11-08 ENCOUNTER — HOSPITAL ENCOUNTER (OUTPATIENT)
Dept: CARDIOLOGY | Age: 72
Discharge: HOME | End: 2022-11-08
Payer: MEDICARE

## 2022-11-08 DIAGNOSIS — I25.10 CORONARY ARTERY DISEASE INVOLVING NATIVE CORONARY ARTERY OF NATIVE HEART WITHOUT ANGINA PECTORIS: ICD-10-CM

## 2022-11-08 DIAGNOSIS — I50.9 CONGESTIVE HEART FAILURE, UNSPECIFIED HF CHRONICITY, UNSPECIFIED HEART FAILURE TYPE (HCC): ICD-10-CM

## 2022-11-08 LAB
LV EF: 60 %
LVEF MODALITY: NORMAL

## 2022-11-08 PROCEDURE — 93306 TTE W/DOPPLER COMPLETE: CPT | Performed by: PSYCHIATRY & NEUROLOGY

## 2022-11-21 ENCOUNTER — TELEPHONE (OUTPATIENT)
Dept: CARDIOLOGY CLINIC | Age: 72
End: 2022-11-21

## 2022-11-21 NOTE — TELEPHONE ENCOUNTER
----- Message from Ca Juares DO sent at 11/21/2022  8:38 AM EST -----  Echo normal heart function with no significant valve issues.

## 2022-11-29 ENCOUNTER — OFFICE VISIT (OUTPATIENT)
Dept: CARDIOLOGY CLINIC | Age: 72
End: 2022-11-29
Payer: MEDICARE

## 2022-11-29 VITALS
DIASTOLIC BLOOD PRESSURE: 72 MMHG | SYSTOLIC BLOOD PRESSURE: 102 MMHG | HEART RATE: 75 BPM | WEIGHT: 232 LBS | HEIGHT: 72 IN | BODY MASS INDEX: 31.42 KG/M2 | RESPIRATION RATE: 12 BRPM

## 2022-11-29 DIAGNOSIS — I25.10 CORONARY ARTERY DISEASE INVOLVING NATIVE CORONARY ARTERY OF NATIVE HEART WITHOUT ANGINA PECTORIS: Primary | ICD-10-CM

## 2022-11-29 PROCEDURE — G8484 FLU IMMUNIZE NO ADMIN: HCPCS | Performed by: INTERNAL MEDICINE

## 2022-11-29 PROCEDURE — 1123F ACP DISCUSS/DSCN MKR DOCD: CPT | Performed by: INTERNAL MEDICINE

## 2022-11-29 PROCEDURE — 3017F COLORECTAL CA SCREEN DOC REV: CPT | Performed by: INTERNAL MEDICINE

## 2022-11-29 PROCEDURE — G8417 CALC BMI ABV UP PARAM F/U: HCPCS | Performed by: INTERNAL MEDICINE

## 2022-11-29 PROCEDURE — 99214 OFFICE O/P EST MOD 30 MIN: CPT | Performed by: INTERNAL MEDICINE

## 2022-11-29 PROCEDURE — G8427 DOCREV CUR MEDS BY ELIG CLIN: HCPCS | Performed by: INTERNAL MEDICINE

## 2022-11-29 PROCEDURE — 1036F TOBACCO NON-USER: CPT | Performed by: INTERNAL MEDICINE

## 2022-11-29 PROCEDURE — 93000 ELECTROCARDIOGRAM COMPLETE: CPT | Performed by: INTERNAL MEDICINE

## 2022-11-29 NOTE — PROGRESS NOTES
CHIEF COMPLAINT: Edema/DIAS/CAD    HPI: Patient is a 74 y.o. male seen at the request of Cici Agosto DO. Patient with history of CAD having had BMS to RCA 12/03. Patient seen in follow up. Improved DIAS and edema. No CP.      Past Medical History:   Diagnosis Date    Allergic rhinitis     Arthritis     Baker's cyst of knee     right    CAD (coronary artery disease)     follows with Dr. Papito Zelaya caries     Depression     DM (diabetes mellitus) ([facility] [address]     GERD (gastroesophageal reflux disease)     Heart murmur     Hyperlipidemia     Hypertension     Intellectual delay     able to write name, limited reading / signs for self    Leg swelling     Lymphedema of both lower extremities 10/13/2022    PONV (postoperative nausea and vomiting)     Preoperative clearance 08/01/2017    cardiac clearance from Dr. Misty Rawls in [facility] notes    Prostate enlargement     Schizo affective schizophrenia ([facility] [address]     stable    Seasonal allergies     Sleep apnea     no use cpap    Speech disorder     since birth    Vitamin D deficiency        Patient Active Problem List   Diagnosis    CAD (coronary artery disease)    Hypogonadism male    Hyperlipemia    Primary osteoarthritis of left knee    Baker's cyst of knee, right    Primary osteoarthritis of right knee    Baker's cyst of knee, left    Vitamin D deficiency    Pneumonia due to COVID-19 virus    Uncontrolled type 2 diabetes mellitus with hyperglycemia ([facility] [address]    Controlled type 2 diabetes mellitus without complication ([facility])    Orthostatic hypotension    Leg swelling    Lymphedema of both lower extremities       Allergies   Allergen Reactions    Seasonal        Current Outpatient Medications   Medication Sig Dispense Refill    apixaban (ELIQUIS) 5 MG TABS tablet Take 1 tablet by mouth 2 times daily 60 tablet 5    fenofibrate (TRICOR) 145 MG tablet Take 1 tablet by mouth daily 90 tablet 1    finasteride (PROSCAR) 5 MG tablet Take 1 tablet by mouth daily 90 tablet 0 metoprolol succinate (TOPROL XL) 25 MG extended release tablet Take 1 tablet by mouth daily 90 tablet 1    HYDROcodone-acetaminophen (NORCO) 5-325 MG per tablet Take 1 tablet by mouth every 12 hours as needed for Pain for up to 30 days. 60 tablet 0    bumetanide (BUMEX) 2 MG tablet Take 1 tablet by mouth daily 90 tablet 3    spironolactone (ALDACTONE) 25 MG tablet Take 1 tablet by mouth daily 90 tablet 3    Compression Stockings MISC by Does not apply route 1 each 0    Multiple Vitamin (DAILY-GERMÁN) TABS Take 1 tablet by mouth daily 30 tablet 3    ezetimibe (ZETIA) 10 MG tablet Take 1 tablet by mouth daily 90 tablet 0    risperiDONE (RISPERDAL) 0.25 MG tablet Take 1 tablet by mouth daily 90 tablet 0    omeprazole (PRILOSEC) 40 MG delayed release capsule Take 1 capsule by mouth daily 90 capsule 1    citalopram (CELEXA) 40 MG tablet Take 1 tablet by mouth daily 90 tablet 0    aspirin 81 MG EC tablet Take 1 tablet by mouth daily 90 tablet 0    vitamin D (ERGOCALCIFEROL) 1.25 MG (50539 UT) CAPS capsule Take 1 capsule by mouth once a week 12 capsule 0    tamsulosin (FLOMAX) 0.4 MG capsule Take 1 capsule by mouth 2 times daily 180 capsule 1    OLANZapine (ZYPREXA) 5 MG tablet Take 1 tablet by mouth daily 30 tablet 0    dexamethasone (DECADRON) 2 MG tablet Take 3 tabs daily x3 days then 2 tabs daily x3 days then 1 tab daily x3 days 18 tablet 0    acetaminophen (TYLENOL) 325 MG tablet Take 325 mg by mouth every 6 hours as needed for Pain      ferrous sulfate (IRON 325) 325 (65 Fe) MG tablet Take 1 tablet by mouth 2 times daily (Patient taking differently: Take 325 mg by mouth 2 times daily (with meals)) 180 tablet 1    nitroGLYCERIN (NITROSTAT) 0.3 MG SL tablet up to max of 3 total doses. If no relief after 1 dose, call 911. 30 tablet 0     No current facility-administered medications for this visit.        Social History     Socioeconomic History    Marital status:      Spouse name: Not on file    Number of children: Not on file    Years of education: Not on file    Highest education level: Not on file   Occupational History    Occupation: maintenance   Tobacco Use    Smoking status: Never    Smokeless tobacco: Never   Vaping Use    Vaping Use: Never used   Substance and Sexual Activity    Alcohol use: No    Drug use: No    Sexual activity: Not on file   Other Topics Concern    Not on file   Social History Narrative    ** Merged History Encounter **          Social Determinants of Health     Financial Resource Strain: Not on file   Food Insecurity: Not on file   Transportation Needs: Not on file   Physical Activity: Not on file   Stress: Not on file   Social Connections: Not on file   Intimate Partner Violence: Not on file   Housing Stability: Not on file       Family History   Problem Relation Age of Onset    Diabetes Mother     Heart Disease Father     Cancer Brother     Liver Disease Brother      Review of Systems:  Heart: as above   Lungs: as above   Eyes: denies changes in vision or discharge. Ears: denies changes in hearing or pain. Nose: denies epistaxis or masses   Throat: denies sore throat or trouble swallowing. Neuro: denies numbness, tingling, tremors. Skin: denies rashes or itching. : denies hematuria, dysuria   GI: denies vomiting, diarrhea   Psych: denies mood changed, anxiety, depression. all others negative. Physical Exam   /72   Pulse 75   Resp 12   Ht 6' (1.829 m)   Wt 232 lb (105.2 kg)   BMI 31.46 kg/m²   Constitutional: Oriented to person, place, and time. Well-developed and well-nourished. No distress. Head: Normocephalic and atraumatic. Eyes: EOM are normal. Pupils are equal, round, and reactive to light. Neck: Normal range of motion. Neck supple. No hepatojugular reflux and no JVD present. Carotid bruit is not present. No tracheal deviation present. No thyromegaly present. Cardiovascular: Normal rate, regular rhythm, normal heart sounds and intact distal pulses.   Exam reveals no gallop and no friction rub. No murmur heard. Pulmonary/Chest: Effort normal and breath sounds normal. No respiratory distress. No wheezes. No rales. No tenderness. Abdominal: Soft. Bowel sounds are normal. No distension and no mass. No tenderness. No rebound and no guarding. Musculoskeletal: Normal range of motion. No edema and no tenderness. Lymphadenopathy:   No cervical adenopathy. No groin adenopathy. Neurological: Alert and oriented to person, place, and time. Skin: Skin is warm and dry. No rash noted. Not diaphoretic. No erythema. Psychiatric: Normal mood and affect. Behavior is normal.     EKG:  sinus rhythm, nonspecific ST and T waves changes. Echo Summary 11/9/2022:   Ejection fraction is visually estimated at 60%. No regional wall motion abnormalities seen. Normal right ventricle structure and function. There is doppler evidence of stage II diastolic dysfunction. Left atrial volume index of 38 ml per meters squared BSA. Physiologic and/or trace mitral regurgitation is present. No evidence for hemodynamically significant pericardial effusion. ASSESSMENT AND PLAN:  Patient Active Problem List   Diagnosis    CAD (coronary artery disease)    Hypogonadism male    Hyperlipemia    Primary osteoarthritis of left knee    Baker's cyst of knee, right    Primary osteoarthritis of right knee    Baker's cyst of knee, left    Vitamin D deficiency    Pneumonia due to COVID-19 virus    Uncontrolled type 2 diabetes mellitus with hyperglycemia ([address]    Controlled type 2 diabetes mellitus without complication (HCC)    Orthostatic hypotension    Leg swelling    Lymphedema of both lower extremities     1. DIAS/Edema:    Echo with normal LVEF and grade 2 DD. Improved with bumex/aldactone. 2. Hx of CAD:    Continue ASA/BB/statin. 3. Lipids: Statin. 4. DM: Per PCP. Yohan Landers D.O.   Cardiologist  Cardiology, [address] [facility]

## 2022-12-19 DIAGNOSIS — I25.10 CORONARY ARTERY DISEASE INVOLVING NATIVE CORONARY ARTERY OF NATIVE HEART WITHOUT ANGINA PECTORIS: ICD-10-CM

## 2022-12-19 DIAGNOSIS — I50.9 CONGESTIVE HEART FAILURE, UNSPECIFIED HF CHRONICITY, UNSPECIFIED HEART FAILURE TYPE (HCC): ICD-10-CM

## 2022-12-20 RX ORDER — BUMETANIDE 2 MG/1
2 TABLET ORAL DAILY
Qty: 90 TABLET | Refills: 3 | Status: SHIPPED | OUTPATIENT
Start: 2022-12-20

## 2023-01-05 ENCOUNTER — TELEPHONE (OUTPATIENT)
Dept: CARDIOLOGY CLINIC | Age: 73
End: 2023-01-05

## 2023-01-05 NOTE — TELEPHONE ENCOUNTER
Wife states that patient has been having headaches,nausea,excessive diaphoresis and thirst, and feels clammy, symptoms began a few days ago, wife believes patient is taking too many meds and wants to d/c bumex or at least reduce the dose, please advise

## 2023-03-28 ENCOUNTER — HOSPITAL ENCOUNTER (EMERGENCY)
Age: 73
Discharge: HOME | End: 2023-03-28
Attending: EMERGENCY MEDICINE
Payer: MEDICARE

## 2023-03-28 ENCOUNTER — APPOINTMENT (OUTPATIENT)
Dept: CT IMAGING | Age: 73
End: 2023-03-28
Payer: MEDICARE

## 2023-03-28 VITALS
WEIGHT: 232 LBS | SYSTOLIC BLOOD PRESSURE: 115 MMHG | TEMPERATURE: 98 F | DIASTOLIC BLOOD PRESSURE: 73 MMHG | BODY MASS INDEX: 31.46 KG/M2 | HEART RATE: 72 BPM | OXYGEN SATURATION: 96 % | RESPIRATION RATE: 18 BRPM

## 2023-03-28 DIAGNOSIS — K59.04 CHRONIC IDIOPATHIC CONSTIPATION: Primary | ICD-10-CM

## 2023-03-28 DIAGNOSIS — R10.84 GENERALIZED ABDOMINAL PAIN: ICD-10-CM

## 2023-03-28 LAB
BACTERIA URNS QL MICRO: NORMAL /HPF
BILIRUB UR QL STRIP: NEGATIVE
CLARITY UR: CLEAR
COLOR UR: YELLOW
GLUCOSE UR STRIP-MCNC: NEGATIVE MG/DL
HGB UR QL STRIP: NEGATIVE
KETONES UR STRIP-MCNC: NEGATIVE MG/DL
LEUKOCYTE ESTERASE UR QL STRIP: NEGATIVE
NITRITE UR QL STRIP: NEGATIVE
PH UR STRIP: 8.5 [PH] (ref 5–9)
PROT UR STRIP-MCNC: NEGATIVE MG/DL
RBC #/AREA URNS HPF: NORMAL /HPF (ref 0–2)
SP GR UR STRIP: 1.01 (ref 1–1.03)
UROBILINOGEN UR STRIP-ACNC: 0.2 E.U./DL
WBC #/AREA URNS HPF: NORMAL /HPF (ref 0–5)

## 2023-03-28 PROCEDURE — 99284 EMERGENCY DEPT VISIT MOD MDM: CPT

## 2023-03-28 PROCEDURE — 87088 URINE BACTERIA CULTURE: CPT

## 2023-03-28 PROCEDURE — 81001 URINALYSIS AUTO W/SCOPE: CPT

## 2023-03-28 PROCEDURE — 74176 CT ABD & PELVIS W/O CONTRAST: CPT

## 2023-03-28 RX ORDER — DOCUSATE SODIUM 100 MG/1
100 CAPSULE, LIQUID FILLED ORAL 2 TIMES DAILY
Qty: 20 CAPSULE | Refills: 0 | Status: SHIPPED | OUTPATIENT
Start: 2023-03-28

## 2023-03-28 ASSESSMENT — PAIN - FUNCTIONAL ASSESSMENT: PAIN_FUNCTIONAL_ASSESSMENT: NONE - DENIES PAIN

## 2023-03-28 NOTE — ED PROVIDER NOTES
HPI:  3/28/23, Time: 5:12 PM EDT         Bird Foreman is a 74 y.o. male presenting to the ED for abdominal bloating no BM for two weeks, beginning two weeks ago. The complaint has been persistent, severe in severity, and worsened by nothing. Worseing dysuria and burning with urination positive passing flutus. No nausea or vomiting no recent abdominal surgeries. Review of Systems:   A complete review of systems was performed and pertinent positives and negatives are stated within HPI, all other systems reviewed and are negative.    --------------------------------------------- PAST HISTORY ---------------------------------------------  Past Medical History:  has a past medical history of Allergic rhinitis, Arthritis, Baker's cyst of knee, CAD (coronary artery disease), Dental caries, Depression, DM (diabetes mellitus) ([facility] [address] GERD (gastroesophageal reflux disease), Heart murmur, Hyperlipidemia, Hypertension, Intellectual delay, Leg swelling, Lymphedema of both lower extremities, PONV (postoperative nausea and vomiting), Preoperative clearance, Prostate enlargement, Schizo affective schizophrenia ([facility] [address] Seasonal allergies, Sleep apnea, Speech disorder, and Vitamin D deficiency. Past Surgical History:  has a past surgical history that includes Coronary angioplasty with stent (early 2000's); eye surgery; Appendectomy; cyst removal; knee surgery (2004); Cataract removal with implant (Bilateral, 2014); Coronary angioplasty with stent; Appendectomy; and Tooth Extraction (10/11/2017). Social History:  reports that he has never smoked. He has never used smokeless tobacco. He reports that he does not drink alcohol and does not use drugs. Family History: family history includes Cancer in his brother; Diabetes in his mother; Heart Disease in his father; Liver Disease in his brother. The patients home medications have been reviewed.     Allergies:

## 2023-03-30 LAB — BACTERIA UR CULT: NORMAL

## 2023-04-03 PROBLEM — I89.0 LYMPHEDEMA OF BOTH LOWER EXTREMITIES: Status: RESOLVED | Noted: 2022-10-13 | Resolved: 2023-04-03

## 2023-04-03 PROBLEM — R73.9 HYPERGLYCEMIA: Status: ACTIVE | Noted: 2023-04-03

## 2023-04-03 PROBLEM — M79.89 LEG SWELLING: Status: RESOLVED | Noted: 2022-10-13 | Resolved: 2023-04-03

## 2023-05-05 ENCOUNTER — OFFICE VISIT (OUTPATIENT)
Dept: CARDIOLOGY CLINIC | Age: 73
End: 2023-05-05
Payer: MEDICARE

## 2023-05-05 VITALS
DIASTOLIC BLOOD PRESSURE: 80 MMHG | RESPIRATION RATE: 16 BRPM | SYSTOLIC BLOOD PRESSURE: 110 MMHG | BODY MASS INDEX: 32.06 KG/M2 | HEART RATE: 81 BPM | WEIGHT: 236.7 LBS | HEIGHT: 72 IN

## 2023-05-05 DIAGNOSIS — I25.10 CORONARY ARTERY DISEASE INVOLVING NATIVE CORONARY ARTERY OF NATIVE HEART WITHOUT ANGINA PECTORIS: Primary | ICD-10-CM

## 2023-05-05 PROCEDURE — 1123F ACP DISCUSS/DSCN MKR DOCD: CPT | Performed by: INTERNAL MEDICINE

## 2023-05-05 PROCEDURE — G8427 DOCREV CUR MEDS BY ELIG CLIN: HCPCS | Performed by: INTERNAL MEDICINE

## 2023-05-05 PROCEDURE — G8417 CALC BMI ABV UP PARAM F/U: HCPCS | Performed by: INTERNAL MEDICINE

## 2023-05-05 PROCEDURE — 3017F COLORECTAL CA SCREEN DOC REV: CPT | Performed by: INTERNAL MEDICINE

## 2023-05-05 PROCEDURE — 99214 OFFICE O/P EST MOD 30 MIN: CPT | Performed by: INTERNAL MEDICINE

## 2023-05-05 PROCEDURE — 1036F TOBACCO NON-USER: CPT | Performed by: INTERNAL MEDICINE

## 2023-05-05 PROCEDURE — 93000 ELECTROCARDIOGRAM COMPLETE: CPT | Performed by: INTERNAL MEDICINE

## 2023-05-05 NOTE — PROGRESS NOTES
artery disease)    Hypogonadism male    Hyperlipemia    Primary osteoarthritis of left knee    Baker's cyst of knee, right    Primary osteoarthritis of right knee    Baker's cyst of knee, left    Vitamin D deficiency    Uncontrolled type 2 diabetes mellitus with hyperglycemia ([address]    Controlled type 2 diabetes mellitus without complication (HCC)    Orthostatic hypotension    Hyperglycemia     1. DIAS/Edema:    Echo with normal LVEF and grade 2 DD. Improved with bumex/aldactone. 2. Hx of CAD:    Continue ASA/BB/statin. 3. Lipids: Statin. 4. DM: Per PCP. Diane Hicks, D.O.   Cardiologist  Cardiology, [facility]

## 2023-06-19 ENCOUNTER — TELEPHONE (OUTPATIENT)
Dept: CARDIOLOGY CLINIC | Age: 73
End: 2023-06-19

## 2023-06-19 DIAGNOSIS — R06.02 SHORTNESS OF BREATH: Primary | ICD-10-CM

## 2023-06-19 RX ORDER — REGADENOSON 0.08 MG/ML
0.4 INJECTION, SOLUTION INTRAVENOUS
OUTPATIENT
Start: 2023-06-19

## 2023-06-19 NOTE — TELEPHONE ENCOUNTER
Arrange andree stress Dx DIAS/CAD. Increase bumex to BID for 3 days.      OV thereafter please and TY

## 2023-06-20 ENCOUNTER — HOSPITAL ENCOUNTER (OUTPATIENT)
Age: 73
Discharge: HOME | End: 2023-06-20
Payer: MEDICARE

## 2023-06-20 DIAGNOSIS — G47.9 FATIGUE DUE TO SLEEP PATTERN DISTURBANCE: ICD-10-CM

## 2023-06-20 DIAGNOSIS — I25.10 CORONARY ARTERY DISEASE INVOLVING NATIVE CORONARY ARTERY OF NATIVE HEART WITHOUT ANGINA PECTORIS: ICD-10-CM

## 2023-06-20 DIAGNOSIS — E11.65 UNCONTROLLED TYPE 2 DIABETES MELLITUS WITH HYPERGLYCEMIA (HCC): ICD-10-CM

## 2023-06-20 DIAGNOSIS — R53.83 FATIGUE DUE TO SLEEP PATTERN DISTURBANCE: ICD-10-CM

## 2023-06-20 DIAGNOSIS — E03.9 HYPOTHYROIDISM, UNSPECIFIED TYPE: ICD-10-CM

## 2023-06-20 LAB
ALBUMIN SERPL-MCNC: 5.1 G/DL (ref 3.5–5.2)
ALP SERPL-CCNC: 47 U/L (ref 40–129)
ALT SERPL-CCNC: 29 U/L (ref 0–40)
ANION GAP SERPL CALCULATED.3IONS-SCNC: 16 MMOL/L (ref 7–16)
AST SERPL-CCNC: 31 U/L (ref 0–39)
BASOPHILS # BLD: 0.04 E9/L (ref 0–0.2)
BASOPHILS NFR BLD: 0.4 % (ref 0–2)
BILIRUB SERPL-MCNC: 0.4 MG/DL (ref 0–1.2)
BUN SERPL-MCNC: 30 MG/DL (ref 6–23)
CALCIUM SERPL-MCNC: 10.7 MG/DL (ref 8.6–10.2)
CHLORIDE SERPL-SCNC: 100 MMOL/L (ref 98–107)
CO2 SERPL-SCNC: 24 MMOL/L (ref 22–29)
CREAT SERPL-MCNC: 1.1 MG/DL (ref 0.7–1.2)
EOSINOPHIL # BLD: 0.14 E9/L (ref 0.05–0.5)
EOSINOPHIL NFR BLD: 1.3 % (ref 0–6)
ERYTHROCYTE [DISTWIDTH] IN BLOOD BY AUTOMATED COUNT: 13.5 FL (ref 11.5–15)
GLUCOSE SERPL-MCNC: 147 MG/DL (ref 74–99)
HBA1C MFR BLD: 7.6 % (ref 4–5.6)
HCT VFR BLD AUTO: 42.7 % (ref 37–54)
HGB BLD-MCNC: 14.2 G/DL (ref 12.5–16.5)
IMM GRANULOCYTES # BLD: 0.06 E9/L
IMM GRANULOCYTES NFR BLD: 0.6 % (ref 0–5)
LYMPHOCYTES # BLD: 2.12 E9/L (ref 1.5–4)
LYMPHOCYTES NFR BLD: 20.2 % (ref 20–42)
MCH RBC QN AUTO: 29.4 PG (ref 26–35)
MCHC RBC AUTO-ENTMCNC: 33.3 % (ref 32–34.5)
MCV RBC AUTO: 88.4 FL (ref 80–99.9)
MONOCYTES # BLD: 0.78 E9/L (ref 0.1–0.95)
MONOCYTES NFR BLD: 7.4 % (ref 2–12)
NEUTROPHILS # BLD: 7.36 E9/L (ref 1.8–7.3)
NEUTS SEG NFR BLD: 70.1 % (ref 43–80)
PLATELET # BLD AUTO: 259 E9/L (ref 130–450)
PMV BLD AUTO: 10.8 FL (ref 7–12)
POTASSIUM SERPL-SCNC: 3.9 MMOL/L (ref 3.5–5)
PROT SERPL-MCNC: 8.1 G/DL (ref 6.4–8.3)
RBC # BLD AUTO: 4.83 E12/L (ref 3.8–5.8)
SODIUM SERPL-SCNC: 140 MMOL/L (ref 132–146)
TSH SERPL-MCNC: 0.76 UIU/ML (ref 0.27–4.2)
WBC # BLD: 10.5 E9/L (ref 4.5–11.5)

## 2023-06-20 PROCEDURE — 36415 COLL VENOUS BLD VENIPUNCTURE: CPT

## 2023-06-20 PROCEDURE — 80053 COMPREHEN METABOLIC PANEL: CPT

## 2023-06-20 PROCEDURE — 84443 ASSAY THYROID STIM HORMONE: CPT

## 2023-06-20 PROCEDURE — 85025 COMPLETE CBC W/AUTO DIFF WBC: CPT

## 2023-06-20 PROCEDURE — 83036 HEMOGLOBIN GLYCOSYLATED A1C: CPT

## 2023-06-30 ENCOUNTER — TELEPHONE (OUTPATIENT)
Dept: CARDIOLOGY | Age: 73
End: 2023-06-30

## 2023-07-05 ENCOUNTER — HOSPITAL ENCOUNTER (OUTPATIENT)
Dept: CARDIOLOGY | Age: 73
Discharge: HOME | End: 2023-07-05
Payer: MEDICARE

## 2023-07-05 VITALS
RESPIRATION RATE: 12 BRPM | HEART RATE: 70 BPM | SYSTOLIC BLOOD PRESSURE: 110 MMHG | DIASTOLIC BLOOD PRESSURE: 70 MMHG | BODY MASS INDEX: 27.09 KG/M2 | WEIGHT: 200 LBS | HEIGHT: 72 IN

## 2023-07-05 DIAGNOSIS — R06.02 SHORTNESS OF BREATH: ICD-10-CM

## 2023-07-05 PROCEDURE — A9500 TC99M SESTAMIBI: HCPCS | Performed by: INTERNAL MEDICINE

## 2023-07-05 PROCEDURE — 3430000000 HC RX DIAGNOSTIC RADIOPHARMACEUTICAL: Performed by: INTERNAL MEDICINE

## 2023-07-05 PROCEDURE — 93017 CV STRESS TEST TRACING ONLY: CPT

## 2023-07-05 PROCEDURE — 2580000003 HC RX 258: Performed by: INTERNAL MEDICINE

## 2023-07-05 PROCEDURE — 78452 HT MUSCLE IMAGE SPECT MULT: CPT

## 2023-07-05 PROCEDURE — 6360000002 HC RX W HCPCS: Performed by: INTERNAL MEDICINE

## 2023-07-05 RX ORDER — TETRAKIS(2-METHOXYISOBUTYLISOCYANIDE)COPPER(I) TETRAFLUOROBORATE 1 MG/ML
32 INJECTION, POWDER, LYOPHILIZED, FOR SOLUTION INTRAVENOUS
Status: COMPLETED | OUTPATIENT
Start: 2023-07-05 | End: 2023-07-05

## 2023-07-05 RX ORDER — SODIUM CHLORIDE 0.9 % (FLUSH) 0.9 %
10 SYRINGE (ML) INJECTION PRN
Status: DISCONTINUED | OUTPATIENT
Start: 2023-07-05 | End: 2023-07-06 | Stop reason: HOSPADM

## 2023-07-05 RX ORDER — TETRAKIS(2-METHOXYISOBUTYLISOCYANIDE)COPPER(I) TETRAFLUOROBORATE 1 MG/ML
11.2 INJECTION, POWDER, LYOPHILIZED, FOR SOLUTION INTRAVENOUS
Status: COMPLETED | OUTPATIENT
Start: 2023-07-05 | End: 2023-07-05

## 2023-07-05 RX ORDER — REGADENOSON 0.08 MG/ML
0.4 INJECTION, SOLUTION INTRAVENOUS
Status: COMPLETED | OUTPATIENT
Start: 2023-07-05 | End: 2023-07-05

## 2023-07-05 RX ADMIN — REGADENOSON 0.4 MG: 0.08 INJECTION, SOLUTION INTRAVENOUS at 09:48

## 2023-07-05 RX ADMIN — SODIUM CHLORIDE, PRESERVATIVE FREE 10 ML: 5 INJECTION INTRAVENOUS at 09:48

## 2023-07-05 RX ADMIN — SODIUM CHLORIDE, PRESERVATIVE FREE 10 ML: 5 INJECTION INTRAVENOUS at 09:49

## 2023-07-05 RX ADMIN — SODIUM CHLORIDE, PRESERVATIVE FREE 10 ML: 5 INJECTION INTRAVENOUS at 07:39

## 2023-07-05 RX ADMIN — Medication 32 MILLICURIE: at 09:48

## 2023-07-05 RX ADMIN — Medication 11.2 MILLICURIE: at 07:39

## 2023-07-05 NOTE — PROCEDURES
[address] [facility] [address]   [facility], [address]  [phone]                Pharmacologic Stress Nuclear Gated SPECT Study    Name: Jamee Vaz Account Number: [de-identified]    :  1950          Sex: male         Date of Study:  2023    Height: 6' (182.9 cm)         Weight - Scale: 200 lb (90.7 kg)     Ordering Provider: Alejandra Cuellar DO          PCP: Sherlyn Rai DO      Cardiologist: Alejandra Cuellar DO             Interpreting Physician: Alejandra Cuellar DO  _________________________________________________________________________________    Indication:   Evaluation of extent and severity of coronary artery disease    Clinical History:   Patient has prior history of coronary artery disease. Resting ECG:    Normal sinus rhythm and Nonspecific ST-T wave changes    Procedure:   Pharmacologic stress testing was performed with regadenoson 0.4 mg for 15 seconds. Additionally, low-level exercise was performed along with the infusion. The heart rate was 82 at baseline and tahmina to 123 beats during the infusion. This corresponds to 84% of maximum predicted heart rate. The blood pressure at baseline was 110/70 and blood pressure at the end of infusion was 108/62. Blood pressure response was normal during the stress procedure. The patient experienced mild shortness of breath and lightheadedness during the infusion. ECG during the infusion did not change. IMAGING: Myocardial perfusion imaging was performed at rest 30-35 minutes following the intravenous injection of 11.2 mCi of (Tc-Sestamibi) followed by 10 ml of Normal Saline. As per infusion protocol, the patient was injected intravenously with 32.0 mCi of (Tc-Sestamibi) followed by 10 ml of Normal Saline. Gated post-stress tomographic imaging was performed 20-25 minutes after stress. FINDINGS: The overall quality of the study was fair.      Left ventricular cavity

## 2023-07-05 NOTE — DISCHARGE INSTRUCTIONS
[facility]      Instructions to Patients    The following are the instructions for patients who have had a procedure in our office today. Patient name: Jerson Pendleton    Radionuclide Activity: 40mCi of 99mTc-Sestamibi    Date Administered: 7/5/2023    Expires: 48 hours after scheduled appointment time      Patient may resume normal activity unless otherwise instructed. Patient may resume medications as normal.  If the need should arise, patient may call [phone] between the hours of 8:00am-5:00pm.  After hours there is at least one physician on-call at all times for those patients needing assistance. Patients may call [phone] and the answering service will direct the patient to one of our physicians for assistance. After the patient's test if they are going to be leaving from an airport in the near future they should take this letter with them to verify the test and radionuclide used for their test.      This letter verifies that the above named bearer received an injection of a radionuclide for medical purpose/usage only.         Electronically signed by Rogelio Teixeira on 7/5/2023 at 9:44 AM

## 2023-07-06 ENCOUNTER — TELEPHONE (OUTPATIENT)
Dept: CARDIOLOGY CLINIC | Age: 73
End: 2023-07-06

## 2023-07-12 PROBLEM — R73.9 HYPERGLYCEMIA: Status: RESOLVED | Noted: 2023-04-03 | Resolved: 2023-07-12

## 2023-07-12 PROBLEM — E11.9 TYPE 2 DIABETES MELLITUS WITHOUT COMPLICATION, WITHOUT LONG-TERM CURRENT USE OF INSULIN (HCC): Status: ACTIVE | Noted: 2023-07-12

## 2023-07-12 PROBLEM — I50.32 CHRONIC HEART FAILURE WITH PRESERVED EJECTION FRACTION (HFPEF) (HCC): Status: ACTIVE | Noted: 2023-07-12

## 2023-07-18 DIAGNOSIS — I25.10 CORONARY ARTERY DISEASE INVOLVING NATIVE CORONARY ARTERY OF NATIVE HEART WITHOUT ANGINA PECTORIS: Primary | ICD-10-CM

## 2023-07-18 DIAGNOSIS — Z01.818 PREOPERATIVE TESTING: ICD-10-CM

## 2023-07-19 ENCOUNTER — TELEPHONE (OUTPATIENT)
Dept: CARDIAC CATH/INVASIVE PROCEDURES | Age: 73
End: 2023-07-19

## 2023-07-20 ENCOUNTER — HOSPITAL ENCOUNTER (OUTPATIENT)
Dept: CARDIAC CATH/INVASIVE PROCEDURES | Age: 73
Setting detail: OBSERVATION
Discharge: HOME | End: 2023-07-21
Attending: INTERNAL MEDICINE | Admitting: INTERNAL MEDICINE
Payer: MEDICARE

## 2023-07-20 PROBLEM — Z95.5 S/P CORONARY ARTERY STENT PLACEMENT: Status: ACTIVE | Noted: 2023-07-20

## 2023-07-20 LAB
ABO + RH BLD: NORMAL
ACTIVATED CLOTTING TIME, LOW RANGE: 243 SEC
ACTIVATED CLOTTING TIME, LOW RANGE: 280 SEC
ACTIVATED CLOTTING TIME, LOW RANGE: 315 SEC
ANION GAP SERPL CALCULATED.3IONS-SCNC: 11 MMOL/L (ref 7–16)
ARM BAND NUMBER: NORMAL
BLOOD BANK SAMPLE EXPIRATION: NORMAL
BLOOD GROUP ANTIBODIES SERPL: NEGATIVE
BUN SERPL-MCNC: 28 MG/DL (ref 6–23)
CALCIUM SERPL-MCNC: 9.8 MG/DL (ref 8.6–10.2)
CHLORIDE SERPL-SCNC: 102 MMOL/L (ref 98–107)
CO2 SERPL-SCNC: 26 MMOL/L (ref 22–29)
CREAT SERPL-MCNC: 1.1 MG/DL (ref 0.7–1.2)
ERYTHROCYTE [DISTWIDTH] IN BLOOD BY AUTOMATED COUNT: 13.3 % (ref 11.5–15)
GFR SERPL CREATININE-BSD FRML MDRD: >60 ML/MIN/1.73M2
GLUCOSE SERPL-MCNC: 124 MG/DL (ref 74–99)
HCT VFR BLD AUTO: 38.7 % (ref 37–54)
HGB BLD-MCNC: 12.4 G/DL (ref 12.5–16.5)
INR PPP: 1.1
MCH RBC QN AUTO: 29.2 PG (ref 26–35)
MCHC RBC AUTO-ENTMCNC: 32 G/DL (ref 32–34.5)
MCV RBC AUTO: 91.3 FL (ref 80–99.9)
METER GLUCOSE: 124 MG/DL (ref 74–99)
METER GLUCOSE: 144 MG/DL (ref 74–99)
METER GLUCOSE: 158 MG/DL (ref 74–99)
PLATELET # BLD AUTO: 217 K/UL (ref 130–450)
PMV BLD AUTO: 11 FL (ref 7–12)
POTASSIUM SERPL-SCNC: 4.2 MMOL/L (ref 3.5–5)
PROTHROMBIN TIME: 11.7 SEC (ref 9.3–12.4)
RBC # BLD AUTO: 4.24 M/UL (ref 3.8–5.8)
SODIUM SERPL-SCNC: 139 MMOL/L (ref 132–146)
WBC OTHER # BLD: 5.5 K/UL (ref 4.5–11.5)

## 2023-07-20 PROCEDURE — 85027 COMPLETE CBC AUTOMATED: CPT

## 2023-07-20 PROCEDURE — G0378 HOSPITAL OBSERVATION PER HR: HCPCS

## 2023-07-20 PROCEDURE — G0379 DIRECT REFER HOSPITAL OBSERV: HCPCS

## 2023-07-20 PROCEDURE — C1769 GUIDE WIRE: HCPCS

## 2023-07-20 PROCEDURE — 6360000002 HC RX W HCPCS

## 2023-07-20 PROCEDURE — 6370000000 HC RX 637 (ALT 250 FOR IP)

## 2023-07-20 PROCEDURE — 82947 ASSAY GLUCOSE BLOOD QUANT: CPT

## 2023-07-20 PROCEDURE — 2500000003 HC RX 250 WO HCPCS

## 2023-07-20 PROCEDURE — C1725 CATH, TRANSLUMIN NON-LASER: HCPCS

## 2023-07-20 PROCEDURE — 2580000003 HC RX 258: Performed by: INTERNAL MEDICINE

## 2023-07-20 PROCEDURE — C1874 STENT, COATED/COV W/DEL SYS: HCPCS

## 2023-07-20 PROCEDURE — 93454 CORONARY ARTERY ANGIO S&I: CPT

## 2023-07-20 PROCEDURE — 86850 RBC ANTIBODY SCREEN: CPT

## 2023-07-20 PROCEDURE — 93005 ELECTROCARDIOGRAM TRACING: CPT | Performed by: INTERNAL MEDICINE

## 2023-07-20 PROCEDURE — 80048 BASIC METABOLIC PNL TOTAL CA: CPT

## 2023-07-20 PROCEDURE — 86900 BLOOD TYPING SEROLOGIC ABO: CPT

## 2023-07-20 PROCEDURE — C1887 CATHETER, GUIDING: HCPCS

## 2023-07-20 PROCEDURE — 6370000000 HC RX 637 (ALT 250 FOR IP): Performed by: INTERNAL MEDICINE

## 2023-07-20 PROCEDURE — 2709999900 HC NON-CHARGEABLE SUPPLY

## 2023-07-20 PROCEDURE — C9600 PERC DRUG-EL COR STENT SING: HCPCS

## 2023-07-20 PROCEDURE — 86901 BLOOD TYPING SEROLOGIC RH(D): CPT

## 2023-07-20 PROCEDURE — C1894 INTRO/SHEATH, NON-LASER: HCPCS

## 2023-07-20 PROCEDURE — 85347 COAGULATION TIME ACTIVATED: CPT

## 2023-07-20 PROCEDURE — 85610 PROTHROMBIN TIME: CPT

## 2023-07-20 RX ORDER — PANTOPRAZOLE SODIUM 40 MG/1
40 TABLET, DELAYED RELEASE ORAL
Status: DISCONTINUED | OUTPATIENT
Start: 2023-07-21 | End: 2023-07-21 | Stop reason: HOSPADM

## 2023-07-20 RX ORDER — ASPIRIN 81 MG/1
81 TABLET ORAL DAILY
Status: DISCONTINUED | OUTPATIENT
Start: 2023-07-20 | End: 2023-07-21 | Stop reason: HOSPADM

## 2023-07-20 RX ORDER — ALPRAZOLAM 0.25 MG/1
0.5 TABLET ORAL 3 TIMES DAILY PRN
Status: DISCONTINUED | OUTPATIENT
Start: 2023-07-20 | End: 2023-07-21 | Stop reason: HOSPADM

## 2023-07-20 RX ORDER — SODIUM CHLORIDE 9 MG/ML
INJECTION, SOLUTION INTRAVENOUS PRN
Status: DISCONTINUED | OUTPATIENT
Start: 2023-07-20 | End: 2023-07-21 | Stop reason: HOSPADM

## 2023-07-20 RX ORDER — ROSUVASTATIN CALCIUM 10 MG/1
5 TABLET, COATED ORAL DAILY
Status: DISCONTINUED | OUTPATIENT
Start: 2023-07-20 | End: 2023-07-21 | Stop reason: HOSPADM

## 2023-07-20 RX ORDER — EZETIMIBE 10 MG/1
10 TABLET ORAL DAILY
Status: DISCONTINUED | OUTPATIENT
Start: 2023-07-20 | End: 2023-07-21 | Stop reason: HOSPADM

## 2023-07-20 RX ORDER — CLOPIDOGREL 300 MG/1
600 TABLET, FILM COATED ORAL ONCE
Status: COMPLETED | OUTPATIENT
Start: 2023-07-20 | End: 2023-07-20

## 2023-07-20 RX ORDER — CITALOPRAM 20 MG/1
40 TABLET ORAL DAILY
Status: DISCONTINUED | OUTPATIENT
Start: 2023-07-20 | End: 2023-07-21 | Stop reason: HOSPADM

## 2023-07-20 RX ORDER — LANOLIN ALCOHOL/MO/W.PET/CERES
3 CREAM (GRAM) TOPICAL NIGHTLY PRN
Status: DISCONTINUED | OUTPATIENT
Start: 2023-07-20 | End: 2023-07-21 | Stop reason: HOSPADM

## 2023-07-20 RX ORDER — FERROUS SULFATE 325(65) MG
325 TABLET ORAL 2 TIMES DAILY WITH MEALS
Status: DISCONTINUED | OUTPATIENT
Start: 2023-07-20 | End: 2023-07-21 | Stop reason: HOSPADM

## 2023-07-20 RX ORDER — SODIUM CHLORIDE 9 MG/ML
INJECTION, SOLUTION INTRAVENOUS CONTINUOUS
Status: ACTIVE | OUTPATIENT
Start: 2023-07-20 | End: 2023-07-21

## 2023-07-20 RX ORDER — NITROGLYCERIN 0.4 MG/1
0.4 TABLET SUBLINGUAL EVERY 5 MIN PRN
Status: DISCONTINUED | OUTPATIENT
Start: 2023-07-20 | End: 2023-07-21 | Stop reason: HOSPADM

## 2023-07-20 RX ORDER — FENOFIBRATE 160 MG/1
160 TABLET ORAL DAILY
Status: DISCONTINUED | OUTPATIENT
Start: 2023-07-20 | End: 2023-07-21 | Stop reason: HOSPADM

## 2023-07-20 RX ORDER — ACETAMINOPHEN 325 MG/1
650 TABLET ORAL EVERY 4 HOURS PRN
Status: DISCONTINUED | OUTPATIENT
Start: 2023-07-20 | End: 2023-07-21 | Stop reason: HOSPADM

## 2023-07-20 RX ORDER — INSULIN LISPRO 100 [IU]/ML
0-8 INJECTION, SOLUTION INTRAVENOUS; SUBCUTANEOUS
Status: DISCONTINUED | OUTPATIENT
Start: 2023-07-20 | End: 2023-07-21 | Stop reason: HOSPADM

## 2023-07-20 RX ORDER — TAMSULOSIN HYDROCHLORIDE 0.4 MG/1
0.4 CAPSULE ORAL 2 TIMES DAILY
Status: DISCONTINUED | OUTPATIENT
Start: 2023-07-20 | End: 2023-07-21 | Stop reason: HOSPADM

## 2023-07-20 RX ORDER — METOPROLOL SUCCINATE 25 MG/1
25 TABLET, EXTENDED RELEASE ORAL DAILY
Status: DISCONTINUED | OUTPATIENT
Start: 2023-07-20 | End: 2023-07-21 | Stop reason: HOSPADM

## 2023-07-20 RX ORDER — ERGOCALCIFEROL 1.25 MG/1
50000 CAPSULE ORAL WEEKLY
Status: DISCONTINUED | OUTPATIENT
Start: 2023-07-23 | End: 2023-07-21 | Stop reason: HOSPADM

## 2023-07-20 RX ORDER — FINASTERIDE 5 MG/1
5 TABLET, FILM COATED ORAL DAILY
Status: DISCONTINUED | OUTPATIENT
Start: 2023-07-20 | End: 2023-07-21 | Stop reason: HOSPADM

## 2023-07-20 RX ORDER — DOCUSATE SODIUM 100 MG/1
100 CAPSULE, LIQUID FILLED ORAL 2 TIMES DAILY
Status: DISCONTINUED | OUTPATIENT
Start: 2023-07-20 | End: 2023-07-21 | Stop reason: HOSPADM

## 2023-07-20 RX ORDER — CLOPIDOGREL BISULFATE 75 MG/1
75 TABLET ORAL DAILY
Status: DISCONTINUED | OUTPATIENT
Start: 2023-07-21 | End: 2023-07-21 | Stop reason: HOSPADM

## 2023-07-20 RX ORDER — SPIRONOLACTONE 25 MG/1
25 TABLET ORAL DAILY
Status: DISCONTINUED | OUTPATIENT
Start: 2023-07-20 | End: 2023-07-21 | Stop reason: HOSPADM

## 2023-07-20 RX ORDER — HYDROCODONE BITARTRATE AND ACETAMINOPHEN 5; 325 MG/1; MG/1
1 TABLET ORAL EVERY 12 HOURS PRN
Status: DISCONTINUED | OUTPATIENT
Start: 2023-07-20 | End: 2023-07-21 | Stop reason: HOSPADM

## 2023-07-20 RX ORDER — SODIUM CHLORIDE 0.9 % (FLUSH) 0.9 %
5-40 SYRINGE (ML) INJECTION PRN
Status: DISCONTINUED | OUTPATIENT
Start: 2023-07-20 | End: 2023-07-21 | Stop reason: HOSPADM

## 2023-07-20 RX ORDER — ACETAMINOPHEN 325 MG/1
325 TABLET ORAL EVERY 6 HOURS PRN
Status: DISCONTINUED | OUTPATIENT
Start: 2023-07-20 | End: 2023-07-20 | Stop reason: SDUPTHER

## 2023-07-20 RX ORDER — SODIUM CHLORIDE 9 MG/ML
INJECTION, SOLUTION INTRAVENOUS ONCE
Status: COMPLETED | OUTPATIENT
Start: 2023-07-20 | End: 2023-07-21

## 2023-07-20 RX ORDER — DEXTROSE MONOHYDRATE 100 MG/ML
INJECTION, SOLUTION INTRAVENOUS CONTINUOUS PRN
Status: DISCONTINUED | OUTPATIENT
Start: 2023-07-20 | End: 2023-07-21 | Stop reason: HOSPADM

## 2023-07-20 RX ORDER — OLANZAPINE 5 MG/1
5 TABLET ORAL DAILY
Status: DISCONTINUED | OUTPATIENT
Start: 2023-07-20 | End: 2023-07-21 | Stop reason: HOSPADM

## 2023-07-20 RX ORDER — SODIUM CHLORIDE 0.9 % (FLUSH) 0.9 %
5-40 SYRINGE (ML) INJECTION EVERY 12 HOURS SCHEDULED
Status: DISCONTINUED | OUTPATIENT
Start: 2023-07-20 | End: 2023-07-21 | Stop reason: HOSPADM

## 2023-07-20 RX ORDER — INSULIN LISPRO 100 [IU]/ML
0-4 INJECTION, SOLUTION INTRAVENOUS; SUBCUTANEOUS NIGHTLY
Status: DISCONTINUED | OUTPATIENT
Start: 2023-07-20 | End: 2023-07-21 | Stop reason: HOSPADM

## 2023-07-20 RX ADMIN — SODIUM CHLORIDE, PRESERVATIVE FREE 10 ML: 5 INJECTION INTRAVENOUS at 20:56

## 2023-07-20 RX ADMIN — FENOFIBRATE 160 MG: 160 TABLET ORAL at 13:18

## 2023-07-20 RX ADMIN — SODIUM CHLORIDE: 9 INJECTION, SOLUTION INTRAVENOUS at 13:14

## 2023-07-20 RX ADMIN — SPIRONOLACTONE 25 MG: 25 TABLET ORAL at 13:18

## 2023-07-20 RX ADMIN — METOPROLOL SUCCINATE 25 MG: 25 TABLET, EXTENDED RELEASE ORAL at 13:18

## 2023-07-20 RX ADMIN — FERROUS SULFATE TAB 325 MG (65 MG ELEMENTAL FE) 325 MG: 325 (65 FE) TAB at 16:22

## 2023-07-20 RX ADMIN — TAMSULOSIN HYDROCHLORIDE 0.4 MG: 0.4 CAPSULE ORAL at 13:19

## 2023-07-20 RX ADMIN — EZETIMIBE 10 MG: 10 TABLET ORAL at 13:18

## 2023-07-20 RX ADMIN — DOCUSATE SODIUM 100 MG: 100 CAPSULE, LIQUID FILLED ORAL at 20:55

## 2023-07-20 RX ADMIN — SODIUM CHLORIDE: 9 INJECTION, SOLUTION INTRAVENOUS at 08:26

## 2023-07-20 RX ADMIN — OLANZAPINE 5 MG: 5 TABLET, FILM COATED ORAL at 13:21

## 2023-07-20 RX ADMIN — CITALOPRAM HYDROBROMIDE 40 MG: 10 TABLET ORAL at 13:18

## 2023-07-20 RX ADMIN — CLOPIDOGREL BISULFATE 600 MG: 300 TABLET, FILM COATED ORAL at 20:55

## 2023-07-20 RX ADMIN — SODIUM CHLORIDE: 9 INJECTION, SOLUTION INTRAVENOUS at 08:25

## 2023-07-20 RX ADMIN — ROSUVASTATIN 5 MG: 10 TABLET, FILM COATED ORAL at 20:55

## 2023-07-20 RX ADMIN — FINASTERIDE 5 MG: 5 TABLET, FILM COATED ORAL at 13:19

## 2023-07-20 NOTE — H&P
CHIEF COMPLAINT: Edema/DIAS/CAD    HPI: Patient is a 74 y.o. male seen at the request of Cici Agosto DO. Patient with history of CAD having had BMS to RCA 12/03. Presents for cath. No CP or SOB. Past Medical History:   Diagnosis Date    Allergic rhinitis     Arthritis     Baker's cyst of knee     right    CAD (coronary artery disease)     follows with Dr. Roseanna Wing caries     Depression     DM (diabetes mellitus) ([address])     GERD (gastroesophageal reflux disease)     Heart murmur     Hyperlipidemia     Hypertension     Intellectual delay     able to write name, limited reading / signs for self    Leg swelling     Lymphedema of both lower extremities 10/13/2022    PONV (postoperative nausea and vomiting)     Preoperative clearance 08/01/2017    cardiac clearance from Dr. Kelle Urban in [place] notes    Prostate enlargement     Schizo affective schizophrenia ([address])     stable    Seasonal allergies     Sleep apnea     no use cpap    Speech disorder     since birth    Vitamin D deficiency        Patient Active Problem List   Diagnosis    CAD (coronary artery disease)    Hypogonadism male    Hyperlipemia    Primary osteoarthritis of left knee    Baker's cyst of knee, right    Primary osteoarthritis of right knee    Baker's cyst of knee, left    Vitamin D deficiency    Orthostatic hypotension    Type 2 diabetes mellitus without complication, without long-term current use of insulin (HCC)    Chronic heart failure with preserved ejection fraction (HFpEF) (HCC)       Allergies   Allergen Reactions    Seasonal        Current Outpatient Medications   Medication Sig Dispense Refill    ibuprofen (ADVIL;MOTRIN) 800 MG tablet Take by mouth      HYDROcodone-acetaminophen (NORCO) 5-325 MG per tablet Take 1 tablet by mouth every 12 hours as needed for Pain for up to 30 days.  Take lowest dose possible to manage pain Max Daily Amount: 2 tablets 60 tablet 0    omeprazole (PRILOSEC) 40 MG delayed release capsule Take

## 2023-07-20 NOTE — PROCEDURES
[address], [address] [place] [address]                            CARDIAC CATHETERIZATION    PATIENT NAME: Naveen Hilton                    :        1950  MED REC NO:   [id number]                            ROOM:       [id number]  ACCOUNT NO:   [de-identified]                           ADMIT DATE: 2023  PROVIDER:     Eliana Kirk MD    DATE OF PROCEDURE:  2023    PROCEDURES PERFORMED:  1. Coronary angiography. 2.  Percutaneous coronary intervention with deployment of 3.0 x 22 mm  [place] drug-eluting stent to mid LAD. 3.  Conscious sedation using Versed and fentanyl. Indication #7, score of 56. Indication for PCI is 16 and 8. DESCRIPTION OF PROCEDURE:  After the appropriate informed consent, the  right wrist area was prepped and draped in the usual sterile fashion. A  time-out was called. The right wrist area was locally anesthetized with  2 mL of 2% lidocaine. A 21-gauge needle was used to access the right  radial artery. A 6-Bruneian introducer sheath was used to cannulate the  right radial artery. A 6-Bruneian JL-3.5 and 6-Bruneian JR-4 catheters were  used for coronary angiography in multiple projections. ANGIOGRAPHIC FINDINGS:  The left main coronary artery arises normally from the left sinus of  Valsalva. It is a good-size vessel without any disease. It trifurcates  into left anterior descending artery, left circumflex artery, and ramus  intermedius artery. The left anterior descending artery is a long vessel, extends down to  the apex. It gives off two large diagonal branches and a couple of  small ones. The first diagonal branch has aneurysmal dilatation in the  proximal and distal segments. The rest of the vessel has no CAD. The  deoxadxt-ge-bae LAD has subtotal occlusion at the takeoff of the first  diagonal branch. The rest of the LAD has no CAD.     The ramus intermedius artery is a

## 2023-07-20 NOTE — PROGRESS NOTES
4 Eyes Skin Assessment     NAME:  Danielito Alexander  YOB: 1950  MEDICAL RECORD NUMBER:  [id number]    The patient is being assessed for  Admission    I agree that at least one RN has performed a thorough Head to Toe Skin Assessment on the patient. ALL assessment sites listed below have been assessed. Areas assessed by both nurses:    Head, Face, Ears, Shoulders, Back, Chest, Arms, Elbows, Hands, Sacrum. Buttock, Coccyx, Ischium, Legs. Feet and Heels, and Under Medical Devices         Does the Patient have a Wound?  No noted wound(s)       Gonzalo Prevention initiated by RN: No  Wound Care Orders initiated by RN: No    Pressure Injury (Stage 3,4, Unstageable, DTI, NWPT, and Complex wounds) if present, place Wound referral order by RN under : No    New Ostomies, if present place, Ostomy referral order under : No     Nurse 1 eSignature: Electronically signed by Aman Chaudhry RN on 7/20/23 at 1:23 PM EDT    **SHARE this note so that the co-signing nurse can place an eSignature**    Nurse 2 eSignature: Electronically signed by Jitendra Ojeda RN on 7/20/23 at 2:17 PM EDT

## 2023-07-21 VITALS
SYSTOLIC BLOOD PRESSURE: 104 MMHG | HEIGHT: 73 IN | BODY MASS INDEX: 28.76 KG/M2 | RESPIRATION RATE: 14 BRPM | WEIGHT: 217 LBS | TEMPERATURE: 97 F | HEART RATE: 61 BPM | DIASTOLIC BLOOD PRESSURE: 66 MMHG | OXYGEN SATURATION: 97 %

## 2023-07-21 LAB
ALBUMIN SERPL-MCNC: 4.2 G/DL (ref 3.5–5.2)
ALP SERPL-CCNC: 37 U/L (ref 40–129)
ALT SERPL-CCNC: 18 U/L (ref 0–40)
ANION GAP SERPL CALCULATED.3IONS-SCNC: 15 MMOL/L (ref 7–16)
AST SERPL-CCNC: 19 U/L (ref 0–39)
BASOPHILS # BLD: 0.04 K/UL (ref 0–0.2)
BASOPHILS NFR BLD: 1 % (ref 0–2)
BILIRUB SERPL-MCNC: 0.3 MG/DL (ref 0–1.2)
BUN SERPL-MCNC: 18 MG/DL (ref 6–23)
CALCIUM SERPL-MCNC: 9.3 MG/DL (ref 8.6–10.2)
CHLORIDE SERPL-SCNC: 107 MMOL/L (ref 98–107)
CO2 SERPL-SCNC: 21 MMOL/L (ref 22–29)
CREAT SERPL-MCNC: 1 MG/DL (ref 0.7–1.2)
EKG ATRIAL RATE: 66 BPM
EKG P AXIS: 56 DEGREES
EKG P-R INTERVAL: 210 MS
EKG Q-T INTERVAL: 454 MS
EKG QRS DURATION: 90 MS
EKG QTC CALCULATION (BAZETT): 475 MS
EKG R AXIS: 53 DEGREES
EKG T AXIS: 58 DEGREES
EKG VENTRICULAR RATE: 66 BPM
EOSINOPHIL # BLD: 0.29 K/UL (ref 0.05–0.5)
EOSINOPHILS RELATIVE PERCENT: 6 % (ref 0–6)
ERYTHROCYTE [DISTWIDTH] IN BLOOD BY AUTOMATED COUNT: 13.6 % (ref 11.5–15)
GFR SERPL CREATININE-BSD FRML MDRD: >60 ML/MIN/1.73M2
GLUCOSE SERPL-MCNC: 184 MG/DL (ref 74–99)
HCT VFR BLD AUTO: 38.9 % (ref 37–54)
HGB BLD-MCNC: 12.4 G/DL (ref 12.5–16.5)
IMM GRANULOCYTES # BLD AUTO: 0.03 K/UL (ref 0–0.58)
IMM GRANULOCYTES NFR BLD: 1 % (ref 0–5)
LYMPHOCYTES NFR BLD: 0.94 K/UL (ref 1.5–4)
LYMPHOCYTES RELATIVE PERCENT: 18 % (ref 20–42)
MCH RBC QN AUTO: 29.5 PG (ref 26–35)
MCHC RBC AUTO-ENTMCNC: 31.9 G/DL (ref 32–34.5)
MCV RBC AUTO: 92.6 FL (ref 80–99.9)
METER GLUCOSE: 115 MG/DL (ref 74–99)
METER GLUCOSE: 141 MG/DL (ref 74–99)
METER GLUCOSE: 143 MG/DL (ref 74–99)
MONOCYTES NFR BLD: 0.34 K/UL (ref 0.1–0.95)
MONOCYTES NFR BLD: 6 % (ref 2–12)
NEUTROPHILS NFR BLD: 69 % (ref 43–80)
NEUTS SEG NFR BLD: 3.68 K/UL (ref 1.8–7.3)
PLATELET # BLD AUTO: 202 K/UL (ref 130–450)
PMV BLD AUTO: 10.9 FL (ref 7–12)
POTASSIUM SERPL-SCNC: 3.8 MMOL/L (ref 3.5–5)
PROT SERPL-MCNC: 6.4 G/DL (ref 6.4–8.3)
RBC # BLD AUTO: 4.2 M/UL (ref 3.8–5.8)
SODIUM SERPL-SCNC: 143 MMOL/L (ref 132–146)
WBC OTHER # BLD: 5.3 K/UL (ref 4.5–11.5)

## 2023-07-21 PROCEDURE — 93010 ELECTROCARDIOGRAM REPORT: CPT | Performed by: INTERNAL MEDICINE

## 2023-07-21 PROCEDURE — 85027 COMPLETE CBC AUTOMATED: CPT

## 2023-07-21 PROCEDURE — 6370000000 HC RX 637 (ALT 250 FOR IP): Performed by: INTERNAL MEDICINE

## 2023-07-21 PROCEDURE — G0378 HOSPITAL OBSERVATION PER HR: HCPCS

## 2023-07-21 PROCEDURE — 36415 COLL VENOUS BLD VENIPUNCTURE: CPT

## 2023-07-21 PROCEDURE — 80053 COMPREHEN METABOLIC PANEL: CPT

## 2023-07-21 PROCEDURE — 82947 ASSAY GLUCOSE BLOOD QUANT: CPT

## 2023-07-21 PROCEDURE — 2580000003 HC RX 258: Performed by: INTERNAL MEDICINE

## 2023-07-21 RX ORDER — CLOPIDOGREL BISULFATE 75 MG/1
75 TABLET ORAL DAILY
Qty: 90 TABLET | Refills: 3 | Status: SHIPPED | OUTPATIENT
Start: 2023-07-21

## 2023-07-21 RX ORDER — PANTOPRAZOLE SODIUM 40 MG/1
40 TABLET, DELAYED RELEASE ORAL
Qty: 90 TABLET | Refills: 3 | Status: SHIPPED | OUTPATIENT
Start: 2023-07-22

## 2023-07-21 RX ADMIN — FINASTERIDE 5 MG: 5 TABLET, FILM COATED ORAL at 08:55

## 2023-07-21 RX ADMIN — APIXABAN 5 MG: 5 TABLET, FILM COATED ORAL at 08:54

## 2023-07-21 RX ADMIN — SODIUM CHLORIDE, PRESERVATIVE FREE 10 ML: 5 INJECTION INTRAVENOUS at 08:55

## 2023-07-21 RX ADMIN — PANTOPRAZOLE SODIUM 40 MG: 40 TABLET, DELAYED RELEASE ORAL at 05:58

## 2023-07-21 RX ADMIN — OLANZAPINE 5 MG: 5 TABLET, FILM COATED ORAL at 08:55

## 2023-07-21 RX ADMIN — ASPIRIN 81 MG: 81 TABLET, COATED ORAL at 08:54

## 2023-07-21 RX ADMIN — EZETIMIBE 10 MG: 10 TABLET ORAL at 08:54

## 2023-07-21 RX ADMIN — CITALOPRAM HYDROBROMIDE 40 MG: 10 TABLET ORAL at 08:54

## 2023-07-21 RX ADMIN — CLOPIDOGREL BISULFATE 75 MG: 75 TABLET ORAL at 08:54

## 2023-07-21 RX ADMIN — TAMSULOSIN HYDROCHLORIDE 0.4 MG: 0.4 CAPSULE ORAL at 08:55

## 2023-07-21 RX ADMIN — DOCUSATE SODIUM 100 MG: 100 CAPSULE, LIQUID FILLED ORAL at 08:54

## 2023-07-21 RX ADMIN — METOPROLOL SUCCINATE 25 MG: 25 TABLET, EXTENDED RELEASE ORAL at 08:55

## 2023-07-21 RX ADMIN — FENOFIBRATE 160 MG: 160 TABLET ORAL at 08:54

## 2023-07-21 RX ADMIN — SPIRONOLACTONE 25 MG: 25 TABLET ORAL at 12:06

## 2023-07-21 RX ADMIN — FERROUS SULFATE TAB 325 MG (65 MG ELEMENTAL FE) 325 MG: 325 (65 FE) TAB at 08:54

## 2023-07-21 ASSESSMENT — PAIN SCALES - GENERAL
PAINLEVEL_OUTOF10: 0

## 2023-07-21 NOTE — DISCHARGE INSTRUCTIONS
POST-CATH  RADIAL DISCHARGE INSTRUCTIONS:  Please follow instructions closely for prevention of complications. INSTRUCTIONS FOR CARE OF CATHETERIZATION SITE: RADIAL (WRIST) APPROACH:  DO NOT BEND wrist for 48 hours  DO NOT PUSH with affected wrist for 48 hrs. DO NOT submerge wrist in water for 3 days  NO blood pressure, IV or blood work in affected arm for 3- 5 days    Remove dressing from wrist tomorrow morning. Gently cleanse, pat dry, apply band aid for 24 hours. Call your physician if you notice:      *Increase in pain at catheterization site      *Increase in swelling at catheterization site      *Redness or drainage at the catheterization site      *Numbness, tingling or cramping in the catheterization arm at rest or with activity    CALL 911 if you have active bleeding from your catheterization site. DO NOT DRIVE YOURSELF TO THE HOSPITAL    Drink 3-4 extra glasses of water today    No driving, or working for 48 hrs    Continue low fat diet. Heart-Healthy Diet: Care Instructions  Your Care Instructions     A heart-healthy diet has lots of vegetables, fruits, nuts, beans, and whole grains, and is low in salt. It limits foods that are high in saturated fat, such as meats, cheeses, and fried foods. It may be hard to change your diet, but even small changes can lower your risk of heart attack and heart disease. Follow-up care is a key part of your treatment and safety. Be sure to make and go to all appointments, and call your doctor if you are having problems. It's also a good idea to know your test results and keep a list of the medicines you take. How can you care for yourself at home? Watch your portions  Use food labels to learn what the recommended servings are for the foods you eat. Eat only the number of calories you need to stay at a healthy weight. If you need to lose weight, eat fewer calories than your body burns (through exercise and other physical activity).   Eat more fruits and Cardiac Rehabilitation  [phone], [address]                                                                                                                        [phone]                                                                                                                        [phone]

## 2023-07-21 NOTE — PROGRESS NOTES
Discharge instructions provided to patient. Information regarding medications, care of the radial cardiac cath site, heart healthy diet, and follow up appointments given. IVs removed and dressings applied. Monitor removed, cleaned, and returned to nurses station. Importance of taking medications as prescribed discussed at length with verbalized understanding. Patient was discharged from the floor with the following belongings:   [address]  Discharge instructions  Stent card    Patient was transported to the main [facility] via wheelchair by this RN, and was assisted into the vehicle as needed. Patient transported home by [employer] transport services. Wife, Tisha Rubalcava, updated. When patient's wife was called, she stated that [facility] would not fill the patient's Plavix prescription because it \"interacted with one of his other meds\". [facility] called and this RN spoke with the pharmacist who stated that she did not approve the script because the patient just picked up a 90 day Eliquis prescription. This RN told the pharmacist that he is to be on Eliquis and Plavix, and she states she will fill the script now. Patient's wife Tisha Rubalcava updated and stats [facility]

## 2023-07-21 NOTE — DISCHARGE SUMMARY
hours as needed for Pain for up to 30 days. Take lowest dose possible to manage pain Max Daily Amount: 2 tablets  Qty: 60 tablet, Refills: 0    Comments: Reduce doses taken as pain becomes manageable  Associated Diagnoses: Chronic low back pain without sciatica, unspecified back pain laterality; Primary osteoarthritis of left knee; Primary osteoarthritis of right knee      OLANZapine (ZYPREXA) 5 MG tablet Take 1 tablet by mouth daily  Qty: 90 tablet, Refills: 1      rosuvastatin (CRESTOR) 5 MG tablet Take 1 tablet by mouth daily  Qty: 90 tablet, Refills: 1      ezetimibe (ZETIA) 10 MG tablet Take 1 tablet by mouth daily  Qty: 90 tablet, Refills: 1    Associated Diagnoses: Prediabetes; Mixed hyperlipidemia      glucose monitoring (FREESTYLE FREEDOM) kit 1 kit by Does not apply route daily  Qty: 1 kit, Refills: 0      blood glucose monitor strips Test 1 times a day & as needed for symptoms of irregular blood glucose. Dispense sufficient amount for indicated testing frequency plus additional to accommodate PRN testing needs. Qty: 100 strip, Refills: 5    Comments: (1) Identify specific brand and product. (2) Include specific quantity. (3) Include frequency.       Lancets MISC 1 each by Does not apply route daily  Qty: 100 each, Refills: 5      vitamin D (ERGOCALCIFEROL) 1.25 MG (80117 UT) CAPS capsule Take 1 capsule by mouth once a week  Qty: 12 capsule, Refills: 3    Associated Diagnoses: Prediabetes      finasteride (PROSCAR) 5 MG tablet Take 1 tablet by mouth daily  Qty: 90 tablet, Refills: 0      loratadine-pseudoephedrine (LORATADINE-D 12HR) 5-120 MG per extended release tablet Take 1 tablet by mouth 2 times daily  Qty: 60 tablet, Refills: 2      metFORMIN (GLUCOPHAGE) 500 MG tablet Take 1 tablet by mouth 2 times daily (with meals)  Qty: 180 tablet, Refills: 1      docusate sodium (COLACE) 100 MG capsule Take 1 capsule by mouth 2 times daily  Qty: 20 capsule, Refills: 0      tamsulosin (FLOMAX) 0.4 MG capsule Take 1 capsule by mouth 2 times daily  Qty: 180 capsule, Refills: 1    Associated Diagnoses: Prediabetes      ferrous sulfate (IRON 325) 325 (65 Fe) MG tablet Take 1 tablet by mouth 2 times daily (with meals)  Qty: 180 tablet, Refills: 1    Associated Diagnoses: Iron deficiency anemia due to chronic blood loss      risperiDONE (RISPERDAL) 0.25 MG tablet Take 1 tablet by mouth daily  Qty: 90 tablet, Refills: 0      bumetanide (BUMEX) 2 MG tablet Take 1 tablet by mouth daily  Qty: 90 tablet, Refills: 3    Associated Diagnoses: Coronary artery disease involving native coronary artery of native heart without angina pectoris; Congestive heart failure, unspecified HF chronicity, unspecified heart failure type (HCC)      apixaban (ELIQUIS) 5 MG TABS tablet Take 1 tablet by mouth 2 times daily  Qty: 60 tablet, Refills: 5      fenofibrate (TRICOR) 145 MG tablet Take 1 tablet by mouth daily  Qty: 90 tablet, Refills: 1    Associated Diagnoses: Prediabetes; Hypogonadism male; Hyperlipidemia, unspecified hyperlipidemia type; High triglycerides; Coronary artery disease involving native coronary artery of native heart without angina pectoris      metoprolol succinate (TOPROL XL) 25 MG extended release tablet Take 1 tablet by mouth daily  Qty: 90 tablet, Refills: 1    Associated Diagnoses: Prediabetes      spironolactone (ALDACTONE) 25 MG tablet Take 1 tablet by mouth daily  Qty: 90 tablet, Refills: 3    Associated Diagnoses: Coronary artery disease involving native coronary artery of native heart without angina pectoris;  Congestive heart failure, unspecified HF chronicity, unspecified heart failure type ([address])      Compression Stockings MISC by Does not apply route  Qty: 1 each, Refills: 0      Multiple Vitamin (DAILY-GERMÁN) TABS Take 1 tablet by mouth daily  Qty: 30 tablet, Refills: 3      citalopram (CELEXA) 40 MG tablet Take 1 tablet by mouth daily  Qty: 90 tablet, Refills: 0      acetaminophen (TYLENOL) 325 MG tablet Take 1

## 2023-07-21 NOTE — PROGRESS NOTES
Patient is seen in follow-up for    Subjective:     Mr. Lizeth Mackenzie      ROS:  CONSTITUTIONAL:  negative for  fevers, chills  HEENT:  negative for earaches, nasal congestion and epistaxis  RESPIRATORY:  negative for  dry cough, cough with sputum,wheezing and hemoptysis  GASTROINTESTINAL:  negative for nausea, vomiting  MUSCULOSKELETAL:  negative for  myalgias, arthralgias  NEUROLOGICAL:  negative for visual disturbance, dysphagia    Medication side effects: {side effects:85312::\"none\"}    Scheduled Meds:   apixaban  5 mg Oral BID    aspirin  81 mg Oral Daily    citalopram  40 mg Oral Daily    docusate sodium  100 mg Oral BID    ezetimibe  10 mg Oral Daily    fenofibrate  160 mg Oral Daily    ferrous sulfate  325 mg Oral BID WC    finasteride  5 mg Oral Daily    metoprolol succinate  25 mg Oral Daily    OLANZapine  5 mg Oral Daily    pantoprazole  40 mg Oral QAM AC    rosuvastatin  5 mg Oral Daily    spironolactone  25 mg Oral Daily    tamsulosin  0.4 mg Oral BID    [START ON 7/23/2023] vitamin D  50,000 Units Oral Weekly    insulin lispro  0-8 Units SubCUTAneous TID WC    insulin lispro  0-4 Units SubCUTAneous Nightly    sodium chloride flush  5-40 mL IntraVENous 2 times per day    clopidogrel  75 mg Oral Daily     Continuous Infusions:   dextrose      sodium chloride       PRN Meds:HYDROcodone-acetaminophen, nitroGLYCERIN, glucose, dextrose bolus **OR** dextrose bolus, glucagon (rDNA), dextrose, melatonin, sodium chloride flush, sodium chloride, acetaminophen, ALPRAZolam    I/O last 3 completed shifts:   In: 450 [I.V.:450]  Out: -   I/O this shift:  In: 240 [P.O.:240]  Out: -       Objective:      Physical Exam:   {Exam, Complete:06838}  CONSTITUTIONAL:  awake, alert, cooperative, no apparent distress, and appears stated age  HEAD:  normocepalic, without obvious abnormality, atraumatic  NECK:  Supple, symmetrical, trachea midline, no adenopathy, thyroid symmetric, not enlarged and no tenderness, skin normal  LUNGS:  No

## 2023-07-21 NOTE — PLAN OF CARE
Problem: Chronic Conditions and Co-morbidities  Goal: Patient's chronic conditions and co-morbidity symptoms are monitored and maintained or improved  Outcome: Progressing  Flowsheets (Taken 7/20/2023 1944)  Care Plan - Patient's Chronic Conditions and Co-Morbidity Symptoms are Monitored and Maintained or Improved: Monitor and assess patient's chronic conditions and comorbid symptoms for stability, deterioration, or improvement     Problem: Discharge Planning  Goal: Discharge to home or other facility with appropriate resources  Outcome: Progressing  Flowsheets (Taken 7/20/2023 1944)  Discharge to home or other facility with appropriate resources: Identify barriers to discharge with patient and caregiver

## 2023-07-21 NOTE — CARE COORDINATION
7/21, SW and CM met with patient at bedside. Confirmed discharge plan will be home today. Patient says he will have ride home through [facility] and that they will need called when patient is ready to leave. Nursing aware of [facility] transport as this was discussed in rounds this am.  RN to contact [facility] once patient is medically cleared for discharge. JENNIFER/ANTHONY to follow.       ZUHAIR Mehta  [facility] Case Management  [phone]

## 2023-07-21 NOTE — PATIENT CARE CONFERENCE
[facility] Quality Flow/Interdisciplinary Rounds Progress Note        Quality Flow Rounds held on July 21, 2023    Disciplines Attending:  Bedside Nurse, , , and Nursing Unit Leadership    Adrian Ferro was admitted on 7/20/2023 11:33 AM    Anticipated Discharge Date:       Disposition:    Gonzalo Score:  Gonzalo Scale Score: 21    Readmission Risk              Risk of Unplanned Readmission:  0           Discussed patient goal for the day, patient clinical progression, and barriers to discharge.   The following Goal(s) of the Day/Commitment(s) have been identified:  Discharge - Obtain Order and Labs - Report Results      Rin Frazier RN  July 21, 2023

## 2023-08-04 PROBLEM — G47.33 OSA (OBSTRUCTIVE SLEEP APNEA): Status: ACTIVE | Noted: 2023-08-04

## 2023-08-07 PROBLEM — R07.9 CHEST PAIN: Status: ACTIVE | Noted: 2023-08-07

## 2023-08-07 PROBLEM — R94.39 ABNORMAL NUCLEAR STRESS TEST: Status: ACTIVE | Noted: 2023-08-07

## 2023-08-10 NOTE — TELEPHONE ENCOUNTER
Okay to half the bumex. Go back to whole dose if swelling increases. Rodrick Archer D.O.   Cardiologist  Cardiology, [address] [facility]

## 2023-09-13 ENCOUNTER — OFFICE VISIT (OUTPATIENT)
Dept: CARDIOLOGY CLINIC | Age: 73
End: 2023-09-13

## 2023-09-13 VITALS
HEART RATE: 89 BPM | HEIGHT: 66 IN | SYSTOLIC BLOOD PRESSURE: 125 MMHG | WEIGHT: 200 LBS | RESPIRATION RATE: 18 BRPM | BODY MASS INDEX: 32.14 KG/M2 | DIASTOLIC BLOOD PRESSURE: 73 MMHG

## 2023-09-13 DIAGNOSIS — I25.10 CORONARY ARTERY DISEASE INVOLVING NATIVE CORONARY ARTERY OF NATIVE HEART WITHOUT ANGINA PECTORIS: Primary | ICD-10-CM

## 2023-09-13 NOTE — PROGRESS NOTES
CHIEF COMPLAINT: Edema/DIAS/CAD    HPI: Patient is a 74 y.o. male seen at the request of Cici Agosto DO. Patient with history of CAD having had BMS to RCA 12/03 and PCI to LAD 7/20/2023. Patient seen in follow up. No CP. Stable DIAS and edema. Past Medical History:   Diagnosis Date    Allergic rhinitis     Arthritis     Baker's cyst of knee     right    CAD (coronary artery disease)     follows with Dr. Kamla Valencia caries     Depression     DM (diabetes mellitus) ([facility])     GERD (gastroesophageal reflux disease)     Heart murmur     Hyperlipidemia     Hypertension     Intellectual delay     able to write name, limited reading / signs for self    Leg swelling     Lymphedema of both lower extremities 10/13/2022    PONV (postoperative nausea and vomiting)     Preoperative clearance 08/01/2017    cardiac clearance from Dr. Tejinder Garcia in [facility] notes    Prostate enlargement     Schizo affective schizophrenia ([address])     stable    Seasonal allergies     Sleep apnea     no use cpap    Speech disorder     since birth    Vitamin D deficiency        Patient Active Problem List   Diagnosis    CAD (coronary artery disease)    Hypogonadism male    Hyperlipemia    Primary osteoarthritis of left knee    Baker's cyst of knee, right    Primary osteoarthritis of right knee    Baker's cyst of knee, left    Vitamin D deficiency    Orthostatic hypotension    Type 2 diabetes mellitus without complication, without long-term current use of insulin ([facility])    Chronic heart failure with preserved ejection fraction (HFpEF) ([facility])    S/P coronary artery stent placement    CHRISTINA (obstructive sleep apnea)    Chest pain    Abnormal nuclear stress test       Allergies   Allergen Reactions    Seasonal        Current Outpatient Medications   Medication Sig Dispense Refill    HYDROcodone-acetaminophen (NORCO) 5-325 MG per tablet Take 1 tablet by mouth every 12 hours as needed for Pain for up to 30 days.  Take lowest dose possible to

## 2023-11-24 DIAGNOSIS — I50.9 CONGESTIVE HEART FAILURE, UNSPECIFIED HF CHRONICITY, UNSPECIFIED HEART FAILURE TYPE (HCC): ICD-10-CM

## 2023-11-24 DIAGNOSIS — I25.10 CORONARY ARTERY DISEASE INVOLVING NATIVE CORONARY ARTERY OF NATIVE HEART WITHOUT ANGINA PECTORIS: ICD-10-CM

## 2023-11-27 RX ORDER — SPIRONOLACTONE 25 MG/1
25 TABLET ORAL DAILY
Qty: 90 TABLET | Refills: 3 | Status: SHIPPED | OUTPATIENT
Start: 2023-11-27

## 2023-11-28 PROBLEM — R07.9 CHEST PAIN: Status: RESOLVED | Noted: 2023-08-07 | Resolved: 2023-11-28

## 2023-11-28 PROBLEM — R94.39 ABNORMAL NUCLEAR STRESS TEST: Status: RESOLVED | Noted: 2023-08-07 | Resolved: 2023-11-28

## 2023-11-29 ENCOUNTER — TELEPHONE (OUTPATIENT)
Dept: ENDOCRINOLOGY | Age: 73
End: 2023-11-29

## 2023-11-29 NOTE — TELEPHONE ENCOUNTER
Transportation took pt to the wrong office this am.  He missed his establishing appt with Dr Celine Zambrano, and would like to get that rescheduled ASAP. Please advise on scheduling.

## 2023-12-05 RX ORDER — ROSUVASTATIN CALCIUM 10 MG/1
10 TABLET, COATED ORAL DAILY
Qty: 90 TABLET | Refills: 1 | Status: SHIPPED | OUTPATIENT
Start: 2023-12-05

## 2024-03-20 ENCOUNTER — OFFICE VISIT (OUTPATIENT)
Dept: ENDOCRINOLOGY | Age: 74
End: 2024-03-20
Payer: MEDICARE

## 2024-03-20 VITALS
RESPIRATION RATE: 18 BRPM | WEIGHT: 223 LBS | DIASTOLIC BLOOD PRESSURE: 74 MMHG | BODY MASS INDEX: 30.2 KG/M2 | HEIGHT: 72 IN | OXYGEN SATURATION: 99 % | SYSTOLIC BLOOD PRESSURE: 119 MMHG | HEART RATE: 73 BPM

## 2024-03-20 DIAGNOSIS — E29.1 MALE HYPOGONADISM: ICD-10-CM

## 2024-03-20 DIAGNOSIS — E11.9 TYPE 2 DIABETES MELLITUS WITHOUT COMPLICATION, WITHOUT LONG-TERM CURRENT USE OF INSULIN (HCC): Primary | ICD-10-CM

## 2024-03-20 LAB — HBA1C MFR BLD: 6.4 %

## 2024-03-20 PROCEDURE — G8417 CALC BMI ABV UP PARAM F/U: HCPCS | Performed by: INTERNAL MEDICINE

## 2024-03-20 PROCEDURE — 1036F TOBACCO NON-USER: CPT | Performed by: INTERNAL MEDICINE

## 2024-03-20 PROCEDURE — G8484 FLU IMMUNIZE NO ADMIN: HCPCS | Performed by: INTERNAL MEDICINE

## 2024-03-20 PROCEDURE — 2022F DILAT RTA XM EVC RTNOPTHY: CPT | Performed by: INTERNAL MEDICINE

## 2024-03-20 PROCEDURE — 3044F HG A1C LEVEL LT 7.0%: CPT | Performed by: INTERNAL MEDICINE

## 2024-03-20 PROCEDURE — 99204 OFFICE O/P NEW MOD 45 MIN: CPT | Performed by: INTERNAL MEDICINE

## 2024-03-20 PROCEDURE — G8427 DOCREV CUR MEDS BY ELIG CLIN: HCPCS | Performed by: INTERNAL MEDICINE

## 2024-03-20 PROCEDURE — 1123F ACP DISCUSS/DSCN MKR DOCD: CPT | Performed by: INTERNAL MEDICINE

## 2024-03-20 PROCEDURE — 3017F COLORECTAL CA SCREEN DOC REV: CPT | Performed by: INTERNAL MEDICINE

## 2024-03-20 PROCEDURE — 83036 HEMOGLOBIN GLYCOSYLATED A1C: CPT | Performed by: INTERNAL MEDICINE

## 2024-03-20 NOTE — PROGRESS NOTES
TESTOSTERONE 120 04/08/2013 10:08 AM     No results found for: \"TESTFC\"  Lab Results   Component Value Date/Time    TESTOSTFR 2.0 09/27/2012 06:29 AM     Lab Results   Component Value Date/Time    TESTM 443 09/27/2012 06:29 AM     Lab Results   Component Value Date/Time    SHBG 29 09/27/2012 06:29 AM     No results found for: \"FSH\"  No results found for: \"LH\"  Lab Results   Component Value Date/Time    FERRITIN 1,781 11/29/2021 07:40 AM     Lab Results   Component Value Date/Time    PSA <0.03 04/12/2023 07:35 AM    PSA 0.03 09/23/2020 02:00 PM    PSA 0.04 10/02/2019 09:24 AM    PSA 0.09 10/01/2018 09:38 AM     Lab Results   Component Value Date/Time    VITD25 47 04/03/2023 12:00 AM       ASSESSMENT & RECOMMENDATIONS   Kris Stallings, a 73 y.o.-old male seen in for management of      Male hypogonadism  Likely due to chronic narcotics use   Ran-out of Testosterone Prescription about 5-6 months ago   Check  AM total/free Testosterone, SHBG, FSH, LH and prolactin level , PSA and Hct    Will follow result and proceed accordingly   Discussed side effects and complications of testosterone therapy including worsening sleep apnea, risk of prostate Ca, Polycythemia and cardiovascular risk)    vitD deficiency   continue vitD supplement     I personally reviewed external notes from PCP and other patient's care team providers, and personally interpreted labs associated with the above diagnosis. I also ordered labs to further assess and manage the above addressed medical conditions.     Return in about 6 months (around 9/20/2024) for Male Hypogonadism.    The above issues were reviewed with the patient who understood and agreed with the plan.    Thank you for allowing us to participate in the care of this patient. Please do not hesitate to contact us with any additional questions.    Diagnosis Orders   1. Type 2 diabetes mellitus without complication, without long-term current use of insulin (HCC)  POCT glycosylated hemoglobin

## 2024-03-22 ENCOUNTER — HOSPITAL ENCOUNTER (OUTPATIENT)
Age: 74
Discharge: HOME | End: 2024-03-22
Payer: MEDICARE

## 2024-03-22 DIAGNOSIS — E29.1 MALE HYPOGONADISM: ICD-10-CM

## 2024-03-22 LAB
HCT VFR BLD AUTO: 40.4 % (ref 37–54)
PSA SERPL-MCNC: 0.01 NG/ML (ref 0–4)

## 2024-03-22 PROCEDURE — 84270 ASSAY OF SEX HORMONE GLOBUL: CPT

## 2024-03-22 PROCEDURE — 84403 ASSAY OF TOTAL TESTOSTERONE: CPT

## 2024-03-22 PROCEDURE — 85014 HEMATOCRIT: CPT

## 2024-03-22 PROCEDURE — 36415 COLL VENOUS BLD VENIPUNCTURE: CPT

## 2024-03-22 PROCEDURE — G0103 PSA SCREENING: HCPCS

## 2024-03-23 LAB
SHBG SERPL-SCNC: 37 NMOL/L (ref 19–76)
TESTOST FREE MFR SERPL: 5.9 PG/ML (ref 47–244)
TESTOST SERPL-MCNC: 35 NG/DL (ref 193–740)
TESTOSTERONE, BIOAVAILABLE: 13.7 NG/DL (ref 130–680)

## 2024-03-24 ENCOUNTER — TELEPHONE (OUTPATIENT)
Dept: ENDOCRINOLOGY | Age: 74
End: 2024-03-24

## 2024-03-24 DIAGNOSIS — E29.1 MALE HYPOGONADISM: Primary | ICD-10-CM

## 2024-03-24 RX ORDER — TESTOSTERONE CYPIONATE 200 MG/ML
INJECTION, SOLUTION INTRAMUSCULAR
Qty: 3 ML | Refills: 1 | Status: SHIPPED | OUTPATIENT
Start: 2024-03-24 | End: 2024-08-29

## 2024-03-24 NOTE — TELEPHONE ENCOUNTER
Notify patient  Your testosterone level is extremely low and you will benefit from testosterone treatment.  I do recommend starting testosterone 100 mg IM injection every 14 days and recheck testosterone level in late May  The following blood work has to be in midway between the injections    Please make sure to get him the appropriate syringes for the injection

## 2024-03-28 DIAGNOSIS — E29.1 MALE HYPOGONADISM: Primary | ICD-10-CM

## 2024-03-28 NOTE — TELEPHONE ENCOUNTER
The pt was notified. He was on injections in the past. He will need needles and syringes. I will escribe them in.

## 2024-04-16 ENCOUNTER — HOSPITAL ENCOUNTER (OUTPATIENT)
Age: 74
Setting detail: OBSERVATION
Discharge: HOME | End: 2024-04-19
Attending: EMERGENCY MEDICINE | Admitting: INTERNAL MEDICINE
Payer: MEDICARE

## 2024-04-16 ENCOUNTER — APPOINTMENT (OUTPATIENT)
Dept: CT IMAGING | Age: 74
End: 2024-04-16
Payer: MEDICARE

## 2024-04-16 ENCOUNTER — APPOINTMENT (OUTPATIENT)
Dept: GENERAL RADIOLOGY | Age: 74
End: 2024-04-16
Payer: MEDICARE

## 2024-04-16 DIAGNOSIS — M25.561 ACUTE PAIN OF RIGHT KNEE: ICD-10-CM

## 2024-04-16 DIAGNOSIS — R29.6 FREQUENT FALLS: Primary | ICD-10-CM

## 2024-04-16 DIAGNOSIS — M79.632 LEFT FOREARM PAIN: ICD-10-CM

## 2024-04-16 DIAGNOSIS — R10.9 RIGHT FLANK PAIN: ICD-10-CM

## 2024-04-16 PROBLEM — R26.2 UNABLE TO AMBULATE: Status: ACTIVE | Noted: 2024-04-16

## 2024-04-16 PROBLEM — I10 PRIMARY HYPERTENSION: Status: ACTIVE | Noted: 2024-04-16

## 2024-04-16 PROBLEM — I48.91 ATRIAL FIBRILLATION (HCC): Status: ACTIVE | Noted: 2024-04-16

## 2024-04-16 PROBLEM — E11.9 CONTROLLED TYPE 2 DIABETES MELLITUS WITHOUT COMPLICATION (HCC): Status: ACTIVE | Noted: 2024-04-16

## 2024-04-16 LAB
ALBUMIN SERPL-MCNC: 4.7 G/DL (ref 3.5–5.2)
ALP SERPL-CCNC: 41 U/L (ref 40–129)
ALT SERPL-CCNC: 21 U/L (ref 0–40)
ANION GAP SERPL CALCULATED.3IONS-SCNC: 12 MMOL/L (ref 7–16)
AST SERPL-CCNC: 32 U/L (ref 0–39)
BASOPHILS # BLD: 0.03 K/UL (ref 0–0.2)
BASOPHILS NFR BLD: 1 % (ref 0–2)
BILIRUB SERPL-MCNC: 0.3 MG/DL (ref 0–1.2)
BUN SERPL-MCNC: 23 MG/DL (ref 6–23)
CALCIUM SERPL-MCNC: 10.2 MG/DL (ref 8.6–10.2)
CHLORIDE SERPL-SCNC: 102 MMOL/L (ref 98–107)
CO2 SERPL-SCNC: 23 MMOL/L (ref 22–29)
CREAT SERPL-MCNC: 0.9 MG/DL (ref 0.7–1.2)
EOSINOPHIL # BLD: 0.25 K/UL (ref 0.05–0.5)
EOSINOPHILS RELATIVE PERCENT: 4 % (ref 0–6)
ERYTHROCYTE [DISTWIDTH] IN BLOOD BY AUTOMATED COUNT: 13.2 % (ref 11.5–15)
GFR SERPL CREATININE-BSD FRML MDRD: 87 ML/MIN/1.73M2
GLUCOSE SERPL-MCNC: 115 MG/DL (ref 74–99)
HCT VFR BLD AUTO: 36.4 % (ref 37–54)
HGB BLD-MCNC: 12 G/DL (ref 12.5–16.5)
IMM GRANULOCYTES # BLD AUTO: 0.03 K/UL (ref 0–0.58)
IMM GRANULOCYTES NFR BLD: 1 % (ref 0–5)
LYMPHOCYTES NFR BLD: 1.16 K/UL (ref 1.5–4)
LYMPHOCYTES RELATIVE PERCENT: 18 % (ref 20–42)
MCH RBC QN AUTO: 29.7 PG (ref 26–35)
MCHC RBC AUTO-ENTMCNC: 33 G/DL (ref 32–34.5)
MCV RBC AUTO: 90.1 FL (ref 80–99.9)
MONOCYTES NFR BLD: 0.59 K/UL (ref 0.1–0.95)
MONOCYTES NFR BLD: 9 % (ref 2–12)
NEUTROPHILS NFR BLD: 68 % (ref 43–80)
NEUTS SEG NFR BLD: 4.32 K/UL (ref 1.8–7.3)
PLATELET # BLD AUTO: 210 K/UL (ref 130–450)
PMV BLD AUTO: 10.6 FL (ref 7–12)
POTASSIUM SERPL-SCNC: 4.4 MMOL/L (ref 3.5–5)
PROT SERPL-MCNC: 7.4 G/DL (ref 6.4–8.3)
RBC # BLD AUTO: 4.04 M/UL (ref 3.8–5.8)
SODIUM SERPL-SCNC: 137 MMOL/L (ref 132–146)
WBC OTHER # BLD: 6.4 K/UL (ref 4.5–11.5)

## 2024-04-16 PROCEDURE — 73090 X-RAY EXAM OF FOREARM: CPT

## 2024-04-16 PROCEDURE — 74176 CT ABD & PELVIS W/O CONTRAST: CPT

## 2024-04-16 PROCEDURE — 73502 X-RAY EXAM HIP UNI 2-3 VIEWS: CPT

## 2024-04-16 PROCEDURE — APPSS45 APP SPLIT SHARED TIME 31-45 MINUTES

## 2024-04-16 PROCEDURE — 85025 COMPLETE CBC W/AUTO DIFF WBC: CPT

## 2024-04-16 PROCEDURE — 99223 1ST HOSP IP/OBS HIGH 75: CPT | Performed by: INTERNAL MEDICINE

## 2024-04-16 PROCEDURE — G0378 HOSPITAL OBSERVATION PER HR: HCPCS

## 2024-04-16 PROCEDURE — 73564 X-RAY EXAM KNEE 4 OR MORE: CPT

## 2024-04-16 PROCEDURE — 99285 EMERGENCY DEPT VISIT HI MDM: CPT

## 2024-04-16 PROCEDURE — 70450 CT HEAD/BRAIN W/O DYE: CPT

## 2024-04-16 PROCEDURE — 71250 CT THORAX DX C-: CPT

## 2024-04-16 PROCEDURE — 72125 CT NECK SPINE W/O DYE: CPT

## 2024-04-16 PROCEDURE — 80053 COMPREHEN METABOLIC PANEL: CPT

## 2024-04-16 RX ORDER — PANTOPRAZOLE SODIUM 40 MG/1
40 TABLET, DELAYED RELEASE ORAL
Status: DISCONTINUED | OUTPATIENT
Start: 2024-04-17 | End: 2024-04-19 | Stop reason: HOSPADM

## 2024-04-16 RX ORDER — ERGOCALCIFEROL 1.25 MG/1
50000 CAPSULE ORAL WEEKLY
Status: DISCONTINUED | OUTPATIENT
Start: 2024-04-16 | End: 2024-04-19 | Stop reason: HOSPADM

## 2024-04-16 RX ORDER — SPIRONOLACTONE 25 MG/1
25 TABLET ORAL DAILY
Status: DISCONTINUED | OUTPATIENT
Start: 2024-04-16 | End: 2024-04-19 | Stop reason: HOSPADM

## 2024-04-16 RX ORDER — POLYETHYLENE GLYCOL 3350 17 G/17G
17 POWDER, FOR SOLUTION ORAL DAILY PRN
Status: DISCONTINUED | OUTPATIENT
Start: 2024-04-16 | End: 2024-04-19 | Stop reason: HOSPADM

## 2024-04-16 RX ORDER — SODIUM CHLORIDE 9 MG/ML
INJECTION, SOLUTION INTRAVENOUS PRN
Status: DISCONTINUED | OUTPATIENT
Start: 2024-04-16 | End: 2024-04-19 | Stop reason: HOSPADM

## 2024-04-16 RX ORDER — RISPERIDONE 0.25 MG/1
0.25 TABLET ORAL DAILY
Status: DISCONTINUED | OUTPATIENT
Start: 2024-04-16 | End: 2024-04-19 | Stop reason: HOSPADM

## 2024-04-16 RX ORDER — CLOPIDOGREL BISULFATE 75 MG/1
75 TABLET ORAL DAILY
Status: DISCONTINUED | OUTPATIENT
Start: 2024-04-16 | End: 2024-04-19 | Stop reason: HOSPADM

## 2024-04-16 RX ORDER — ACETAMINOPHEN 650 MG/1
650 SUPPOSITORY RECTAL EVERY 6 HOURS PRN
Status: DISCONTINUED | OUTPATIENT
Start: 2024-04-16 | End: 2024-04-19 | Stop reason: HOSPADM

## 2024-04-16 RX ORDER — INSULIN LISPRO 100 [IU]/ML
0-8 INJECTION, SOLUTION INTRAVENOUS; SUBCUTANEOUS
Status: DISCONTINUED | OUTPATIENT
Start: 2024-04-16 | End: 2024-04-19 | Stop reason: HOSPADM

## 2024-04-16 RX ORDER — INSULIN LISPRO 100 [IU]/ML
0-4 INJECTION, SOLUTION INTRAVENOUS; SUBCUTANEOUS NIGHTLY
Status: DISCONTINUED | OUTPATIENT
Start: 2024-04-16 | End: 2024-04-19 | Stop reason: HOSPADM

## 2024-04-16 RX ORDER — TAMSULOSIN HYDROCHLORIDE 0.4 MG/1
0.4 CAPSULE ORAL 2 TIMES DAILY
Status: DISCONTINUED | OUTPATIENT
Start: 2024-04-16 | End: 2024-04-19 | Stop reason: HOSPADM

## 2024-04-16 RX ORDER — ROSUVASTATIN CALCIUM 10 MG/1
10 TABLET, COATED ORAL DAILY
Status: DISCONTINUED | OUTPATIENT
Start: 2024-04-16 | End: 2024-04-19 | Stop reason: HOSPADM

## 2024-04-16 RX ORDER — GLUCAGON 1 MG/ML
1 KIT INJECTION PRN
Status: DISCONTINUED | OUTPATIENT
Start: 2024-04-16 | End: 2024-04-19 | Stop reason: HOSPADM

## 2024-04-16 RX ORDER — SODIUM CHLORIDE 0.9 % (FLUSH) 0.9 %
5-40 SYRINGE (ML) INJECTION EVERY 12 HOURS SCHEDULED
Status: DISCONTINUED | OUTPATIENT
Start: 2024-04-16 | End: 2024-04-19 | Stop reason: HOSPADM

## 2024-04-16 RX ORDER — ACETAMINOPHEN 325 MG/1
650 TABLET ORAL EVERY 6 HOURS PRN
Status: DISCONTINUED | OUTPATIENT
Start: 2024-04-16 | End: 2024-04-19 | Stop reason: HOSPADM

## 2024-04-16 RX ORDER — SODIUM CHLORIDE 0.9 % (FLUSH) 0.9 %
5-40 SYRINGE (ML) INJECTION PRN
Status: DISCONTINUED | OUTPATIENT
Start: 2024-04-16 | End: 2024-04-19 | Stop reason: HOSPADM

## 2024-04-16 RX ORDER — CITALOPRAM 20 MG/1
40 TABLET ORAL DAILY
Status: DISCONTINUED | OUTPATIENT
Start: 2024-04-16 | End: 2024-04-19 | Stop reason: HOSPADM

## 2024-04-16 RX ORDER — EZETIMIBE 10 MG/1
10 TABLET ORAL DAILY
Status: DISCONTINUED | OUTPATIENT
Start: 2024-04-16 | End: 2024-04-19 | Stop reason: HOSPADM

## 2024-04-16 RX ORDER — POTASSIUM CHLORIDE 7.45 MG/ML
10 INJECTION INTRAVENOUS PRN
Status: DISCONTINUED | OUTPATIENT
Start: 2024-04-16 | End: 2024-04-19 | Stop reason: HOSPADM

## 2024-04-16 RX ORDER — POTASSIUM CHLORIDE 20 MEQ/1
40 TABLET, EXTENDED RELEASE ORAL PRN
Status: DISCONTINUED | OUTPATIENT
Start: 2024-04-16 | End: 2024-04-19 | Stop reason: HOSPADM

## 2024-04-16 RX ORDER — ONDANSETRON 4 MG/1
4 TABLET, ORALLY DISINTEGRATING ORAL EVERY 8 HOURS PRN
Status: DISCONTINUED | OUTPATIENT
Start: 2024-04-16 | End: 2024-04-19 | Stop reason: HOSPADM

## 2024-04-16 RX ORDER — MAGNESIUM SULFATE IN WATER 40 MG/ML
2000 INJECTION, SOLUTION INTRAVENOUS PRN
Status: DISCONTINUED | OUTPATIENT
Start: 2024-04-16 | End: 2024-04-19 | Stop reason: HOSPADM

## 2024-04-16 RX ORDER — METOPROLOL SUCCINATE 25 MG/1
25 TABLET, EXTENDED RELEASE ORAL DAILY
Status: DISCONTINUED | OUTPATIENT
Start: 2024-04-16 | End: 2024-04-19 | Stop reason: HOSPADM

## 2024-04-16 RX ORDER — M-VIT,TX,IRON,MINS/CALC/FOLIC 27MG-0.4MG
1 TABLET ORAL DAILY
Status: DISCONTINUED | OUTPATIENT
Start: 2024-04-16 | End: 2024-04-19 | Stop reason: HOSPADM

## 2024-04-16 RX ORDER — ONDANSETRON 2 MG/ML
4 INJECTION INTRAMUSCULAR; INTRAVENOUS EVERY 6 HOURS PRN
Status: DISCONTINUED | OUTPATIENT
Start: 2024-04-16 | End: 2024-04-19 | Stop reason: HOSPADM

## 2024-04-16 RX ORDER — FENOFIBRATE 160 MG/1
160 TABLET ORAL DAILY
Status: DISCONTINUED | OUTPATIENT
Start: 2024-04-16 | End: 2024-04-19 | Stop reason: HOSPADM

## 2024-04-16 RX ORDER — DEXTROSE MONOHYDRATE 100 MG/ML
INJECTION, SOLUTION INTRAVENOUS CONTINUOUS PRN
Status: DISCONTINUED | OUTPATIENT
Start: 2024-04-16 | End: 2024-04-18 | Stop reason: SDUPTHER

## 2024-04-16 ASSESSMENT — ENCOUNTER SYMPTOMS
ABDOMINAL PAIN: 0
BACK PAIN: 0
EYE DISCHARGE: 0
VOMITING: 0
NAUSEA: 0
SORE THROAT: 0
EYE REDNESS: 0
WHEEZING: 0
SINUS PRESSURE: 0
EYE PAIN: 0
DIARRHEA: 0
COUGH: 0
SHORTNESS OF BREATH: 0

## 2024-04-16 NOTE — CARE COORDINATION
Social Work/Transition of Care:    Pts spouse requested Provide a ride be called for Transportation Home, SW called [phone] spoke to Yoon who stated pts Transportation Benefit has  and pt/spouse must call in order to have the benefit renewed.        Electronically signed by ZUHAIR bowen on 2024 at 4:49 PM

## 2024-04-16 NOTE — H&P
Sliding scale insulin, Accu-Cheks before meals and at bedtime.  Hypertension: Continue Toprol.  Hyperlipidemia: Continue Zetia and Triglide.  CAD: Continue Plavix.  HFpEF: Most recent echo showed EF 60% with stage II diastolic dysfunction.  Continue Aldactone and Toprol.  Low-sodium diet.  CHRISTINA: Does not use CPAP.      Code Status: Full  DVT prophylaxis: Eliquis    45 minutes or more spent reviewing patient chart, assessing patient, discussing plan of care with patient and family, discussing plan of care with collaborating physician, and documentation.    NOTE: This report was transcribed using voice recognition software. Every effort was made to ensure accuracy; however, inadvertent computerized transcription errors may be present.  Electronically signed by TYSON Hyman CNP on 4/16/2024 at 7:44 PM   HOSPITALIST ATTENDING PHYSICIAN NOTE 4/16/2024 2125PM:    Details of the evaluation - subjective assessment (including medication profile, past medical, family and social history when applicable), examination, review of lab and test data, diagnostic impressions and medical decision making - performed by TYSON Hyman CNP, were discussed with me on the date of service and I agree with clinical information herein unless otherwise noted.    The patient has been evaluated by me personally earlier today.  Pt reports no fevers, chills,n/v.         PHYSICAL EXAM:    Vitals:  /70   Pulse 71   Temp 98 °F (36.7 °C) (Oral)   Resp 18   Wt 90.7 kg (200 lb)   SpO2 98%   BMI 27.12 kg/m²     General:  Appears comfortable. Answers questions appropriately and cooperative with exam  HEENT:  Mucous membranes moist. No erythema, rhinorrhea, or post-nasal drip noted.  Neck:  No carotid bruits.  Heart:  Rhythm regular at rate of 72  Lungs:  CTA.  No wheeze, rales, or rhonchi  Abdomen:  Positive bowel sounds positive.  Soft.  Non-tender. No guarding, rebound or rigidity.  Breast/Rectal/Genitourinary: not

## 2024-04-16 NOTE — ED PROVIDER NOTES
73-year-old male presents to the emergency department after a fall at home that apparently occurred sometime last week.  He is reporting worsening pain in the right leg right side and left forearm.  Patient states his right knee gave out today prompting him to call ambulance to come to the emergency department.  He is not sure if he hit his head and reports no significant neck pain.  He reports no other injuries.  Review of chart shows patient is on Eliquis.  States no other complaints at this time    The history is provided by the patient.   Fall  The accident occurred More than 2 days ago. He fell from a height of 1 to 2 ft. The point of impact was the right hip and left elbow. The pain is present in the right hip and left elbow. Pertinent negatives include no fever, no abdominal pain, no nausea, no vomiting and no headaches.        Review of Systems   Constitutional:  Negative for chills and fever.   HENT:  Negative for ear pain, sinus pressure and sore throat.    Eyes:  Negative for pain, discharge and redness.   Respiratory:  Negative for cough, shortness of breath and wheezing.    Cardiovascular:  Negative for chest pain.   Gastrointestinal:  Negative for abdominal pain, diarrhea, nausea and vomiting.   Genitourinary:  Negative for dysuria and frequency.   Musculoskeletal:  Negative for arthralgias and back pain.   Skin:  Negative for rash and wound.   Neurological:  Negative for weakness and headaches.   Hematological:  Negative for adenopathy.   All other systems reviewed and are negative.       Physical Exam  Constitutional:       Appearance: Normal appearance. He is obese.   HENT:      Head: Normocephalic and atraumatic.      Nose: Nose normal.      Mouth/Throat:      Mouth: Mucous membranes are moist.   Eyes:      Extraocular Movements: Extraocular movements intact.      Pupils: Pupils are equal, round, and reactive to light.   Cardiovascular:      Rate and Rhythm: Normal rate and regular rhythm.       exam:->Pain FINDINGS: No acute osseous findings.  Diffuse soft tissue swelling.  No abnormal soft tissue gas.     Diffuse soft tissue swelling.  No abnormal soft tissue gas.     CT ABDOMEN PELVIS WO CONTRAST Additional Contrast? None    Result Date: 4/16/2024  EXAMINATION: CT OF THE ABDOMEN AND PELVIS WITHOUT CONTRAST 4/16/2024 3:17 pm TECHNIQUE: CT of the abdomen and pelvis was performed without the administration of intravenous contrast. Multiplanar reformatted images are provided for review. Automated exposure control, iterative reconstruction, and/or weight based adjustment of the mA/kV was utilized to reduce the radiation dose to as low as reasonably achievable. COMPARISON: The previous study performed 03/28/2023. HISTORY: ORDERING SYSTEM PROVIDED HISTORY: fall TECHNOLOGIST PROVIDED HISTORY: Reason for exam:->fall Additional Contrast?->None Decision Support Exception - unselect if not a suspected or confirmed emergency medical condition->Emergency Medical Condition (MA) FINDINGS: Lower Chest: Pulmonary parenchymal scar formation is again identified in the lower lobes, right greater than left.  It is also seen within the visualized right middle lobe.  Mild bronchiectasis is again seen involving the lower lobes, right slightly greater than left. Organs: Diffuse fatty infiltration is again identified, however, there has been a decrease in the left lobe.  The spleen, biliary tree, pancreas, adrenal glands, and kidneys are unremarkable for a non IV contrast study. Intraluminal gallbladder sludge is noted. GI/Bowel: The stomach is suboptimally distended, but grossly unremarkable. The small and large bowel are normal in appearance for a non oral contrast study.  A moderate amount of retained stool is seen within the ascending colon and rectum.  The appendix is not visualized. Pelvis: The urinary bladder is well distended.  A tiny vesicourachal diverticulum is again tiny calcifications are again identified near the

## 2024-04-16 NOTE — PROGRESS NOTES
Database initiated. Patient is A&O comes in from home with wife who is in a wheelchair. States he uses a walker and is RA at baseline. He has difficulty speaking and is hard to understand. He has fallen recently. He is unsure of his medications. Him and his wife need help at home.

## 2024-04-17 LAB
ALBUMIN SERPL-MCNC: 4.5 G/DL (ref 3.5–5.2)
ALP SERPL-CCNC: 35 U/L (ref 40–129)
ALT SERPL-CCNC: 22 U/L (ref 0–40)
ANION GAP SERPL CALCULATED.3IONS-SCNC: 14 MMOL/L (ref 7–16)
AST SERPL-CCNC: 25 U/L (ref 0–39)
BASOPHILS # BLD: 0.04 K/UL (ref 0–0.2)
BASOPHILS NFR BLD: 1 % (ref 0–2)
BILIRUB SERPL-MCNC: 0.4 MG/DL (ref 0–1.2)
BUN SERPL-MCNC: 19 MG/DL (ref 6–23)
CALCIUM SERPL-MCNC: 9.8 MG/DL (ref 8.6–10.2)
CHLORIDE SERPL-SCNC: 103 MMOL/L (ref 98–107)
CO2 SERPL-SCNC: 22 MMOL/L (ref 22–29)
CREAT SERPL-MCNC: 1 MG/DL (ref 0.7–1.2)
EOSINOPHIL # BLD: 0.47 K/UL (ref 0.05–0.5)
EOSINOPHILS RELATIVE PERCENT: 6 % (ref 0–6)
ERYTHROCYTE [DISTWIDTH] IN BLOOD BY AUTOMATED COUNT: 13.4 % (ref 11.5–15)
GFR SERPL CREATININE-BSD FRML MDRD: 82 ML/MIN/1.73M2
GLUCOSE BLD-MCNC: 113 MG/DL (ref 74–99)
GLUCOSE BLD-MCNC: 120 MG/DL (ref 74–99)
GLUCOSE BLD-MCNC: 142 MG/DL (ref 74–99)
GLUCOSE BLD-MCNC: 160 MG/DL (ref 74–99)
GLUCOSE SERPL-MCNC: 129 MG/DL (ref 74–99)
HCT VFR BLD AUTO: 36.9 % (ref 37–54)
HGB BLD-MCNC: 12.1 G/DL (ref 12.5–16.5)
IMM GRANULOCYTES # BLD AUTO: 0.04 K/UL (ref 0–0.58)
IMM GRANULOCYTES NFR BLD: 1 % (ref 0–5)
LYMPHOCYTES NFR BLD: 1.79 K/UL (ref 1.5–4)
LYMPHOCYTES RELATIVE PERCENT: 24 % (ref 20–42)
MCH RBC QN AUTO: 29.5 PG (ref 26–35)
MCHC RBC AUTO-ENTMCNC: 32.8 G/DL (ref 32–34.5)
MCV RBC AUTO: 90 FL (ref 80–99.9)
MONOCYTES NFR BLD: 0.75 K/UL (ref 0.1–0.95)
MONOCYTES NFR BLD: 10 % (ref 2–12)
NEUTROPHILS NFR BLD: 59 % (ref 43–80)
NEUTS SEG NFR BLD: 4.35 K/UL (ref 1.8–7.3)
PLATELET # BLD AUTO: 214 K/UL (ref 130–450)
PMV BLD AUTO: 10.5 FL (ref 7–12)
POTASSIUM SERPL-SCNC: 4.3 MMOL/L (ref 3.5–5)
PROT SERPL-MCNC: 7.1 G/DL (ref 6.4–8.3)
RBC # BLD AUTO: 4.1 M/UL (ref 3.8–5.8)
SODIUM SERPL-SCNC: 139 MMOL/L (ref 132–146)
WBC OTHER # BLD: 7.4 K/UL (ref 4.5–11.5)

## 2024-04-17 PROCEDURE — 82962 GLUCOSE BLOOD TEST: CPT

## 2024-04-17 PROCEDURE — 6370000000 HC RX 637 (ALT 250 FOR IP): Performed by: INTERNAL MEDICINE

## 2024-04-17 PROCEDURE — G0378 HOSPITAL OBSERVATION PER HR: HCPCS

## 2024-04-17 PROCEDURE — 2580000003 HC RX 258: Performed by: INTERNAL MEDICINE

## 2024-04-17 PROCEDURE — 97161 PT EVAL LOW COMPLEX 20 MIN: CPT

## 2024-04-17 PROCEDURE — 99232 SBSQ HOSP IP/OBS MODERATE 35: CPT | Performed by: STUDENT IN AN ORGANIZED HEALTH CARE EDUCATION/TRAINING PROGRAM

## 2024-04-17 PROCEDURE — 85025 COMPLETE CBC W/AUTO DIFF WBC: CPT

## 2024-04-17 PROCEDURE — 97165 OT EVAL LOW COMPLEX 30 MIN: CPT

## 2024-04-17 PROCEDURE — 80053 COMPREHEN METABOLIC PANEL: CPT

## 2024-04-17 RX ORDER — CETIRIZINE HYDROCHLORIDE 10 MG/1
10 TABLET ORAL DAILY
Status: DISCONTINUED | OUTPATIENT
Start: 2024-04-17 | End: 2024-04-19 | Stop reason: HOSPADM

## 2024-04-17 RX ORDER — PSEUDOEPHEDRINE HCL 120 MG/1
120 TABLET, FILM COATED, EXTENDED RELEASE ORAL 2 TIMES DAILY
Status: DISCONTINUED | OUTPATIENT
Start: 2024-04-17 | End: 2024-04-19 | Stop reason: HOSPADM

## 2024-04-17 RX ADMIN — APIXABAN 5 MG: 5 TABLET, FILM COATED ORAL at 20:39

## 2024-04-17 RX ADMIN — EZETIMIBE 10 MG: 10 TABLET ORAL at 08:47

## 2024-04-17 RX ADMIN — ACETAMINOPHEN 650 MG: 325 TABLET ORAL at 23:18

## 2024-04-17 RX ADMIN — ROSUVASTATIN CALCIUM 10 MG: 10 TABLET, FILM COATED ORAL at 08:48

## 2024-04-17 RX ADMIN — APIXABAN 5 MG: 5 TABLET, FILM COATED ORAL at 08:50

## 2024-04-17 RX ADMIN — TAMSULOSIN HYDROCHLORIDE 0.4 MG: 0.4 CAPSULE ORAL at 01:47

## 2024-04-17 RX ADMIN — CLOPIDOGREL BISULFATE 75 MG: 75 TABLET ORAL at 01:04

## 2024-04-17 RX ADMIN — Medication 1 TABLET: at 01:05

## 2024-04-17 RX ADMIN — PANTOPRAZOLE SODIUM 40 MG: 40 TABLET, DELAYED RELEASE ORAL at 08:48

## 2024-04-17 RX ADMIN — CITALOPRAM 40 MG: 20 TABLET, FILM COATED ORAL at 08:48

## 2024-04-17 RX ADMIN — CETIRIZINE HYDROCHLORIDE 10 MG: 10 TABLET, FILM COATED ORAL at 08:48

## 2024-04-17 RX ADMIN — CITALOPRAM 40 MG: 20 TABLET, FILM COATED ORAL at 01:04

## 2024-04-17 RX ADMIN — PSEUDOEPHEDRINE HCL 120 MG: 120 TABLET, FILM COATED, EXTENDED RELEASE ORAL at 20:38

## 2024-04-17 RX ADMIN — TAMSULOSIN HYDROCHLORIDE 0.4 MG: 0.4 CAPSULE ORAL at 08:48

## 2024-04-17 RX ADMIN — FENOFIBRATE 160 MG: 160 TABLET ORAL at 08:47

## 2024-04-17 RX ADMIN — PSEUDOEPHEDRINE HCL 120 MG: 120 TABLET, FILM COATED, EXTENDED RELEASE ORAL at 08:48

## 2024-04-17 RX ADMIN — CLOPIDOGREL BISULFATE 75 MG: 75 TABLET ORAL at 08:48

## 2024-04-17 RX ADMIN — SODIUM CHLORIDE, PRESERVATIVE FREE 10 ML: 5 INJECTION INTRAVENOUS at 01:47

## 2024-04-17 RX ADMIN — Medication 1 TABLET: at 08:48

## 2024-04-17 RX ADMIN — METOPROLOL SUCCINATE 25 MG: 25 TABLET, EXTENDED RELEASE ORAL at 01:04

## 2024-04-17 RX ADMIN — SPIRONOLACTONE 25 MG: 25 TABLET ORAL at 08:48

## 2024-04-17 RX ADMIN — METOPROLOL SUCCINATE 25 MG: 25 TABLET, EXTENDED RELEASE ORAL at 08:48

## 2024-04-17 RX ADMIN — ACETAMINOPHEN 650 MG: 325 TABLET ORAL at 01:05

## 2024-04-17 RX ADMIN — SODIUM CHLORIDE, PRESERVATIVE FREE 10 ML: 5 INJECTION INTRAVENOUS at 20:39

## 2024-04-17 RX ADMIN — SODIUM CHLORIDE, PRESERVATIVE FREE 10 ML: 5 INJECTION INTRAVENOUS at 08:48

## 2024-04-17 RX ADMIN — RISPERIDONE 0.25 MG: 0.25 TABLET, FILM COATED ORAL at 08:48

## 2024-04-17 RX ADMIN — TAMSULOSIN HYDROCHLORIDE 0.4 MG: 0.4 CAPSULE ORAL at 20:39

## 2024-04-17 RX ADMIN — APIXABAN 5 MG: 5 TABLET, FILM COATED ORAL at 01:04

## 2024-04-17 ASSESSMENT — PAIN - FUNCTIONAL ASSESSMENT: PAIN_FUNCTIONAL_ASSESSMENT: ACTIVITIES ARE NOT PREVENTED

## 2024-04-17 ASSESSMENT — PAIN SCALES - GENERAL
PAINLEVEL_OUTOF10: 6
PAINLEVEL_OUTOF10: 6
PAINLEVEL_OUTOF10: 0
PAINLEVEL_OUTOF10: 8
PAINLEVEL_OUTOF10: 0

## 2024-04-17 ASSESSMENT — PAIN DESCRIPTION - LOCATION
LOCATION: HIP;LEG
LOCATION: HIP
LOCATION: LEG;HIP

## 2024-04-17 ASSESSMENT — PAIN DESCRIPTION - PAIN TYPE: TYPE: ACUTE PAIN

## 2024-04-17 ASSESSMENT — PAIN DESCRIPTION - ORIENTATION
ORIENTATION: RIGHT
ORIENTATION: RIGHT

## 2024-04-17 ASSESSMENT — PAIN DESCRIPTION - DESCRIPTORS
DESCRIPTORS: ACHING
DESCRIPTORS: DISCOMFORT;CRAMPING;ACHING

## 2024-04-17 NOTE — PROGRESS NOTES
Consult called to Dr Molina office     Spoke with Dr Molina RN re: consult. Dr. Riggins is on call with [facility]    Call placed to [facility] and office and VM left for Dr Riggins with consult information

## 2024-04-17 NOTE — ED NOTES
ED to Inpatient Handoff Report    Notified Rachael that electronic handoff available and patient ready for transport to room 0648.    Safety Risks: Risk of falls    Patient in Restraints: no    Constant Observer or Patient : no    Telemetry Monitoring Ordered :Yes           Order to transfer to unit without monitor:NO    Last MEWS: 0 Time completed: 0609    Deterioration Index Score:   Predictive Model Details          22 (Normal)  Factor Value    Calculated 4/17/2024 06:13 61% Age 73 years old    Deterioration Index Model 19% Respiratory rate 14     12% Potassium 4.4 mmol/L     4% Systolic 123     2% Sodium 137 mmol/L     1% Hematocrit abnormal (36.4 %)     1% Temperature 97.2 °F (36.2 °C)     1% Pulse 67     1% Pulse oximetry 97 %     0% WBC count 6.4 k/uL        Vitals:    04/16/24 1708 04/16/24 1848 04/17/24 0103 04/17/24 0609   BP: 138/76 136/70 118/75 123/75   Pulse: 72 71 69 67   Resp: 16 18 16 14   Temp:    97.2 °F (36.2 °C)   TempSrc:       SpO2: 99% 98% 95% 97%   Weight:             Opportunity for questions and clarification was provided.

## 2024-04-17 NOTE — CARE COORDINATION
Social Work / Discharge Planning: Patient OBSERVATION. Admitted with unable to ambulate. Patient did well with therapy. Therapy am/pac 17/24. SW spoke to patient spouse Beata and explained role as discharge planner/ transition of care. Goals at discharge discussed and NOT agreeable for SNF. Agreeable to HHC and choices discussed and no hx or no preference. REferral to [facility]. Will need HHC orders. Patient does NOT have ww with wheels and SW will order. NO DME preference and can order through [facility]. PCP is DR Agosto. Son is very involved and assist patient and spouse. AWait plan. Ortho has been consulted. Transportation through [facility] care per Spouse Ambulette form completed.SW to follow. .Electronically signed by ZUHAIR Wagner on 4/17/24 at 1:27 PM EDT     Addendum: SW met with patient and updated him in regards to plan : Home with HHC : [facility] and JENNIFER will order him a ww. Patient thanked JENNIFER. JENNIFER to follow. Electronically signed by ZUHAIR Wagner on 4/17/24 at 1:31 PM EDT

## 2024-04-17 NOTE — PROGRESS NOTES
Occupational Therapy    OCCUPATIONAL THERAPY INITIAL EVALUATION    [facility]   [address], [place]         Date:2024                                                  Patient Name: Kris Stallings    MRN: [id number]    : 1950    Room: [address] [place] [address]      Evaluating OT: Lexus Mandel OTR/L   [id number]      Referring Provider:Emerson Giordano MD     Specific Provider Orders/Date:OT eval and treat 2024      Diagnosis:  Unable to ambulate [R26.2]     Pertinent Medical History: arthritis, DM, lymphedema, schizo  affective schizophrenia, intellectual delay       Precautions:  Fall Risk,      Assessment of current deficits    [x] Functional mobility  [x]ADLs  [] Strength               []Cognition    [x] Functional transfers   [x] IADLs         [x] Safety Awareness   [x]Endurance    [] Fine Coordination              [x] Balance      [] Vision/perception   []Sensation     []Gross Motor Coordination  [] ROM  [] Delirium                   [] Motor Control     OT PLAN OF CARE   OT POC based on physician orders, patient diagnosis and results of clinical assessment    Frequency/Duration  2-4 days/wk for 2 - 4weeks PRN   Specific OT Treatment Interventions to include:   ADL retraining/adapted techniques and AE recommendations to increase functional independence within precautions                    Energy conservation techniques to improve tolerance for selfcare routine   Functional transfer/mobility training/DME recommendations for increased independence, safety and fall prevention         Patient/family education to increase safety and functional independence             Environmental modifications for safe mobility and completion of ADLs                             Therapeutic activity to improve functional performance during ADLs.                                         Therapeutic exercise to improve tolerance and functional strength for ADLs

## 2024-04-17 NOTE — PROGRESS NOTES
[facility] Hospitalist Progress Note    Admitting Date and Time: 4/16/2024  1:20 PM  Admit Dx: Unable to ambulate [R26.2]    Subjective:  Patient is being followed for Unable to ambulate [R26.2]   Pt was seen and examined today. Denies any new issues    ROS: denies fever, chills, cp, sob, n/v, HA unless stated above.     cetirizine  10 mg Oral Daily    And    pseudoephedrine  120 mg Oral BID    apixaban  5 mg Oral BID    citalopram  40 mg Oral Daily    clopidogrel  75 mg Oral Daily    ezetimibe  10 mg Oral Daily    fenofibrate  160 mg Oral Daily    metoprolol succinate  25 mg Oral Daily    therapeutic multivitamin-minerals  1 tablet Oral Daily    pantoprazole  40 mg Oral QAM AC    risperiDONE  0.25 mg Oral Daily    rosuvastatin  10 mg Oral Daily    spironolactone  25 mg Oral Daily    tamsulosin  0.4 mg Oral BID    vitamin D  50,000 Units Oral Weekly    insulin lispro  0-8 Units SubCUTAneous TID WC    insulin lispro  0-4 Units SubCUTAneous Nightly    sodium chloride flush  5-40 mL IntraVENous 2 times per day     dextrose bolus, 125 mL, PRN   Or  dextrose bolus, 250 mL, PRN  glucagon (rDNA), 1 mg, PRN  dextrose, , Continuous PRN  sodium chloride flush, 5-40 mL, PRN  sodium chloride, , PRN  potassium chloride, 40 mEq, PRN   Or  potassium alternative oral replacement, 40 mEq, PRN   Or  potassium chloride, 10 mEq, PRN  magnesium sulfate, 2,000 mg, PRN  ondansetron, 4 mg, Q8H PRN   Or  ondansetron, 4 mg, Q6H PRN  polyethylene glycol, 17 g, Daily PRN  acetaminophen, 650 mg, Q6H PRN   Or  acetaminophen, 650 mg, Q6H PRN  Glucose, 15 g, PRN         Objective:    /70   Pulse 77   Temp 97.5 °F (36.4 °C) (Oral)   Resp 16   Ht 1.829 m (6')   Wt 90.7 kg (200 lb)   SpO2 94%   BMI 27.12 kg/m²     General Appearance: alert and in no acute distress  Skin: warm and dry  Head: normocephalic and atraumatic  Pulmonary/Chest: clear to auscultation bilaterally- no wheezes, rales or rhonchi, normal air movement, no

## 2024-04-17 NOTE — PROGRESS NOTES
P Quality Flow/Interdisciplinary Rounds Progress Note        Quality Flow Rounds held on April 17, 2024    Disciplines Attending:  Bedside Nurse, , , and Nursing Unit Leadership    Kris Stallings was admitted on 4/16/2024  1:20 PM    Anticipated Discharge Date:   tbd    Disposition: snf    Gonzalo Score:  Gonzalo Scale Score: 18    Readmission Risk              Risk of Unplanned Readmission:  0           Discussed patient goal for the day, patient clinical progression, and barriers to discharge.  The following Goal(s) of the Day/Commitment(s) have been identified:   PTOT joann Lovett RN  April 17, 2024

## 2024-04-17 NOTE — ACP (ADVANCE CARE PLANNING)
Advance Care Planning   Healthcare Decision Maker:    Primary Decision Maker: Beata Stallings - Spouse - [phone]    Secondary Decision Maker: Domingo Stallings - Child - [phone]    Click here to complete Healthcare Decision Makers including selection of the Healthcare Decision Maker Relationship (ie \"Primary\").  Today we documented Decision Maker(s) consistent with Legal Next of Kin hierarchy.

## 2024-04-17 NOTE — PROGRESS NOTES
appropriate assistive devices or modalities to obtain goals. Patient and family education will also be administered as needed.        PLAN OF CARE:    Current Treatment Recommendations     [x] Strengthening to improve independence with functional mobility   [] ROM to improve independence with functional mobility   [x] Balance Training to improve static/dynamic balance and to reduce fall risk  [x] Endurance Training to improve activity tolerance during functional mobility   [x] Transfer Training to improve safety and independence with all functional transfers   [x] Gait Training to improve gait mechanics, endurance and assess need for appropriate assistive device  [x] Stair Training in preparation for safe discharge home and/or into the community   [x] Positioning to prevent skin breakdown and contractures  [x] Safety and Education Training   [x] Patient/Caregiver Education   [] HEP  [] Other     Frequency of treatments will be 2-5x/week x 3-10 days.    Time in:  1000  Time out:  1014       Evaluation Time includes thorough review of current medical information, gathering information on past medical history/social history and prior level of function, completion of standardized testing/informal observation of tasks, assessment of data and education on plan of care and goals.    CPT codes:  [x] Low Complexity PT evaluation 12827  [] Moderate Complexity PT evaluation 11121  [] High Complexity PT evaluation 48877  [] PT Re-evaluation 64671  [] Gait training 56550  minutes  [] Therapeutic activities 97720  minutes  [] Therapeutic exercises 84310  minutes  [] Neuromuscular reeducation 06754  minutes       Celina Thomas  License number:  [id number]

## 2024-04-17 NOTE — PROGRESS NOTES
Wife, Beata call unit wanting to speak to physician. Dr. Giordano notified regarding waiting wanting update.

## 2024-04-17 NOTE — CONSULTS
Podiatry Consult H&P  4/17/2024   Herminiosunshine AGUILLON Chandrakant     HISTORY OF PRESENT ILLNESS: This is a 73 y.o. male seen bedside for Nailcare. Patient complains that nails are elongated, thickened, and difficult to trim. Patient has no other pedal complaints. Patient denies n,v,f,c,d at this time.    Past Medical History:   Diagnosis Date    Allergic rhinitis     Arthritis     Baker's cyst of knee     right    CAD (coronary artery disease)     follows with Dr. CORDELIA Flores    Dental caries     Depression     DM (diabetes mellitus) (HCC)     GERD (gastroesophageal reflux disease)     Heart murmur     Hyperlipidemia     Hypertension     Intellectual delay     able to write name, limited reading / signs for self    Leg swelling     Lymphedema of both lower extremities 10/13/2022    PONV (postoperative nausea and vomiting)     Preoperative clearance 08/01/2017    cardiac clearance from Dr. CORDELIA Flores in EPIC notes    Prostate enlargement     Schizo affective schizophrenia (HCC)     stable    Seasonal allergies     Sleep apnea     no use cpap    Speech disorder     since birth    Vitamin D deficiency         Past Surgical History:   Procedure Laterality Date    APPENDECTOMY      APPENDECTOMY      CATARACT REMOVAL WITH IMPLANT Bilateral 2014    CORONARY ANGIOPLASTY WITH STENT PLACEMENT  early 2000's    CORONARY ANGIOPLASTY WITH STENT PLACEMENT      CYST REMOVAL      On top of his head    EYE SURGERY      Child- Lazy Eye    KNEE SURGERY  2004    Right Knee-orthoscopic surgery    TOOTH EXTRACTION  10/11/2017    full mouth extractions         Family History   Problem Relation Age of Onset    Diabetes Mother     Heart Disease Father     Cancer Brother     Liver Disease Brother         Social History     Tobacco Use    Smoking status: Never    Smokeless tobacco: Never   Substance Use Topics    Alcohol use: No        Prior to Admission medications    Medication Sig Start Date End Date Taking? Authorizing Provider   SYRINGE-NEEDLE, DISP, 3  ML 22G X 1-1/2\" 3 ML MISC 1 each by Does not apply route every 14 days 3/28/24   Jose M Schneider MD   testosterone cypionate (DEPOTESTOTERONE CYPIONATE) 200 MG/ML injection Inject 0.5 ml (100 mg) intramuscular every 14 days 3/24/24 8/29/24  Jose M Schneider MD   Multiple Vitamin (DAILY-GERMÁN) TABS Take 1 tablet by mouth daily 1/22/24   Cici Agosto DO   vitamin D (ERGOCALCIFEROL) 1.25 MG (07073 UT) CAPS capsule Take 1 capsule by mouth once a week 1/22/24   Cici Agosto DO   HYDROcodone-acetaminophen (NORCO) 5-325 MG per tablet Take 1 tablet by mouth every 12 hours as needed for Pain for up to 30 days. Take lowest dose possible to manage pain Max Daily Amount: 2 tablets 1/16/24 2/15/24  Cici Agosto DO   apixaban (ELIQUIS) 5 MG TABS tablet Take 1 tablet by mouth 2 times daily 1/12/24   Cici Agosto DO   pantoprazole (PROTONIX) 40 MG tablet Take 1 tablet by mouth every morning (before breakfast) 1/12/24   Cici Agosto DO   finasteride (PROSCAR) 5 MG tablet Take 1 tablet by mouth daily 1/12/24 4/11/24  Cici Agosto DO   OLANZapine (ZYPREXA) 5 MG tablet Take 1 tablet by mouth daily 1/12/24 4/11/24  Cici Agosto DO   rosuvastatin (CRESTOR) 10 MG tablet Take 1 tablet by mouth daily 12/5/23   Jose M Schneider MD   fenofibrate (TRICOR) 145 MG tablet Take 1 tablet by mouth daily 11/29/23   Cici Agosto DO   metoprolol succinate (TOPROL XL) 25 MG extended release tablet Take 1 tablet by mouth daily 11/29/23   Cici Agosto DO   citalopram (CELEXA) 40 MG tablet Take 1 tablet by mouth daily 11/29/23   Cici Agosto DO   ezetimibe (ZETIA) 10 MG tablet Take 1 tablet by mouth daily 11/29/23   Cici Agosto DO   ferrous sulfate (IRON 325) 325 (65 Fe) MG tablet Take 1 tablet by mouth 2 times daily (with meals)  Patient not taking: Reported on 3/20/2024 11/29/23   Cici Agosto DO   spironolactone (ALDACTONE) 25 MG tablet Take 1 tablet by mouth daily 11/27/23   Ofelia Flores, DO

## 2024-04-17 NOTE — PROGRESS NOTES
.4 Eyes Skin Assessment     NAME:  Kris Stallings  YOB: 1950  MEDICAL RECORD NUMBER:  [id number]    The patient is being assessed for  Admission    I agree that at least one RN has performed a thorough Head to Toe Skin Assessment on the patient. ALL assessment sites listed below have been assessed.      Areas assessed by both nurses:    Head, Face, Ears, Shoulders, Back, Chest, Arms, Elbows, Hands, Sacrum. Buttock, Coccyx, Ischium, Legs. Feet and Heels, and Under Medical Devices         Does the Patient have a Wound? No noted wound(s)       Gonzalo Prevention initiated by RN: Yes  Wound Care Orders initiated by RN: No    Pressure Injury (Stage 3,4, Unstageable, DTI, NWPT, and Complex wounds) if present, place Wound referral order by RN under : No    New Ostomies, if present place, Ostomy referral order under : No     Nurse 1 eSignature: Electronically signed by Tiffani Quick RN on 4/17/24 at 12:09 PM EDT    **SHARE this note so that the co-signing nurse can place an eSignature**    Nurse 2 eSignature: Electronically signed by Vi Ramirez RN on 4/17/24 at 1:48 PM EDT

## 2024-04-18 LAB
ALBUMIN SERPL-MCNC: 4.6 G/DL (ref 3.5–5.2)
ALP SERPL-CCNC: 37 U/L (ref 40–129)
ALT SERPL-CCNC: 21 U/L (ref 0–40)
ANION GAP SERPL CALCULATED.3IONS-SCNC: 11 MMOL/L (ref 7–16)
AST SERPL-CCNC: 23 U/L (ref 0–39)
BASOPHILS # BLD: 0.03 K/UL (ref 0–0.2)
BASOPHILS NFR BLD: 1 % (ref 0–2)
BILIRUB SERPL-MCNC: 0.5 MG/DL (ref 0–1.2)
BUN SERPL-MCNC: 17 MG/DL (ref 6–23)
CALCIUM SERPL-MCNC: 9.9 MG/DL (ref 8.6–10.2)
CHLORIDE SERPL-SCNC: 102 MMOL/L (ref 98–107)
CO2 SERPL-SCNC: 24 MMOL/L (ref 22–29)
CREAT SERPL-MCNC: 1 MG/DL (ref 0.7–1.2)
EOSINOPHIL # BLD: 0.37 K/UL (ref 0.05–0.5)
EOSINOPHILS RELATIVE PERCENT: 6 % (ref 0–6)
ERYTHROCYTE [DISTWIDTH] IN BLOOD BY AUTOMATED COUNT: 13.4 % (ref 11.5–15)
GFR SERPL CREATININE-BSD FRML MDRD: 83 ML/MIN/1.73M2
GLUCOSE BLD-MCNC: 135 MG/DL (ref 74–99)
GLUCOSE BLD-MCNC: 144 MG/DL (ref 74–99)
GLUCOSE BLD-MCNC: 148 MG/DL (ref 74–99)
GLUCOSE BLD-MCNC: 167 MG/DL (ref 74–99)
GLUCOSE SERPL-MCNC: 135 MG/DL (ref 74–99)
HBA1C MFR BLD: 6.6 % (ref 4–5.6)
HCT VFR BLD AUTO: 37.9 % (ref 37–54)
HGB BLD-MCNC: 12.1 G/DL (ref 12.5–16.5)
IMM GRANULOCYTES # BLD AUTO: 0.03 K/UL (ref 0–0.58)
IMM GRANULOCYTES NFR BLD: 1 % (ref 0–5)
LYMPHOCYTES NFR BLD: 1.27 K/UL (ref 1.5–4)
LYMPHOCYTES RELATIVE PERCENT: 21 % (ref 20–42)
MCH RBC QN AUTO: 28.6 PG (ref 26–35)
MCHC RBC AUTO-ENTMCNC: 31.9 G/DL (ref 32–34.5)
MCV RBC AUTO: 89.6 FL (ref 80–99.9)
MONOCYTES NFR BLD: 0.52 K/UL (ref 0.1–0.95)
MONOCYTES NFR BLD: 8 % (ref 2–12)
NEUTROPHILS NFR BLD: 64 % (ref 43–80)
NEUTS SEG NFR BLD: 3.94 K/UL (ref 1.8–7.3)
PLATELET # BLD AUTO: 207 K/UL (ref 130–450)
PMV BLD AUTO: 10.2 FL (ref 7–12)
POTASSIUM SERPL-SCNC: 4 MMOL/L (ref 3.5–5)
PROT SERPL-MCNC: 7.2 G/DL (ref 6.4–8.3)
RBC # BLD AUTO: 4.23 M/UL (ref 3.8–5.8)
SODIUM SERPL-SCNC: 137 MMOL/L (ref 132–146)
WBC OTHER # BLD: 6.2 K/UL (ref 4.5–11.5)

## 2024-04-18 PROCEDURE — 6370000000 HC RX 637 (ALT 250 FOR IP): Performed by: INTERNAL MEDICINE

## 2024-04-18 PROCEDURE — 2580000003 HC RX 258: Performed by: INTERNAL MEDICINE

## 2024-04-18 PROCEDURE — 99232 SBSQ HOSP IP/OBS MODERATE 35: CPT | Performed by: STUDENT IN AN ORGANIZED HEALTH CARE EDUCATION/TRAINING PROGRAM

## 2024-04-18 PROCEDURE — 6370000000 HC RX 637 (ALT 250 FOR IP)

## 2024-04-18 PROCEDURE — 36415 COLL VENOUS BLD VENIPUNCTURE: CPT

## 2024-04-18 PROCEDURE — G0378 HOSPITAL OBSERVATION PER HR: HCPCS

## 2024-04-18 PROCEDURE — 85025 COMPLETE CBC W/AUTO DIFF WBC: CPT

## 2024-04-18 PROCEDURE — 80053 COMPREHEN METABOLIC PANEL: CPT

## 2024-04-18 PROCEDURE — 82962 GLUCOSE BLOOD TEST: CPT

## 2024-04-18 PROCEDURE — 83036 HEMOGLOBIN GLYCOSYLATED A1C: CPT

## 2024-04-18 RX ORDER — HYDROCODONE BITARTRATE AND ACETAMINOPHEN 5; 325 MG/1; MG/1
1 TABLET ORAL EVERY 6 HOURS PRN
Status: DISCONTINUED | OUTPATIENT
Start: 2024-04-18 | End: 2024-04-19 | Stop reason: HOSPADM

## 2024-04-18 RX ORDER — DEXTROSE MONOHYDRATE 100 MG/ML
INJECTION, SOLUTION INTRAVENOUS CONTINUOUS PRN
Status: DISCONTINUED | OUTPATIENT
Start: 2024-04-18 | End: 2024-04-19 | Stop reason: HOSPADM

## 2024-04-18 RX ADMIN — APIXABAN 5 MG: 5 TABLET, FILM COATED ORAL at 21:31

## 2024-04-18 RX ADMIN — TAMSULOSIN HYDROCHLORIDE 0.4 MG: 0.4 CAPSULE ORAL at 08:36

## 2024-04-18 RX ADMIN — CLOPIDOGREL BISULFATE 75 MG: 75 TABLET ORAL at 08:37

## 2024-04-18 RX ADMIN — EZETIMIBE 10 MG: 10 TABLET ORAL at 08:37

## 2024-04-18 RX ADMIN — CETIRIZINE HYDROCHLORIDE 10 MG: 10 TABLET, FILM COATED ORAL at 08:37

## 2024-04-18 RX ADMIN — RISPERIDONE 0.25 MG: 0.25 TABLET, FILM COATED ORAL at 08:37

## 2024-04-18 RX ADMIN — METOPROLOL SUCCINATE 25 MG: 25 TABLET, EXTENDED RELEASE ORAL at 08:36

## 2024-04-18 RX ADMIN — CITALOPRAM 40 MG: 20 TABLET, FILM COATED ORAL at 08:36

## 2024-04-18 RX ADMIN — SPIRONOLACTONE 25 MG: 25 TABLET ORAL at 08:36

## 2024-04-18 RX ADMIN — PSEUDOEPHEDRINE HCL 120 MG: 120 TABLET, FILM COATED, EXTENDED RELEASE ORAL at 21:31

## 2024-04-18 RX ADMIN — HYDROCODONE BITARTRATE AND ACETAMINOPHEN 1 TABLET: 5; 325 TABLET ORAL at 03:47

## 2024-04-18 RX ADMIN — HYDROCODONE BITARTRATE AND ACETAMINOPHEN 1 TABLET: 5; 325 TABLET ORAL at 23:54

## 2024-04-18 RX ADMIN — SODIUM CHLORIDE, PRESERVATIVE FREE 10 ML: 5 INJECTION INTRAVENOUS at 21:31

## 2024-04-18 RX ADMIN — Medication 1 TABLET: at 08:37

## 2024-04-18 RX ADMIN — FENOFIBRATE 160 MG: 160 TABLET ORAL at 08:37

## 2024-04-18 RX ADMIN — PANTOPRAZOLE SODIUM 40 MG: 40 TABLET, DELAYED RELEASE ORAL at 08:37

## 2024-04-18 RX ADMIN — APIXABAN 5 MG: 5 TABLET, FILM COATED ORAL at 08:37

## 2024-04-18 RX ADMIN — SODIUM CHLORIDE, PRESERVATIVE FREE 10 ML: 5 INJECTION INTRAVENOUS at 08:38

## 2024-04-18 RX ADMIN — ROSUVASTATIN CALCIUM 10 MG: 10 TABLET, FILM COATED ORAL at 08:37

## 2024-04-18 RX ADMIN — TAMSULOSIN HYDROCHLORIDE 0.4 MG: 0.4 CAPSULE ORAL at 21:31

## 2024-04-18 RX ADMIN — PSEUDOEPHEDRINE HCL 120 MG: 120 TABLET, FILM COATED, EXTENDED RELEASE ORAL at 08:37

## 2024-04-18 ASSESSMENT — PAIN DESCRIPTION - ORIENTATION
ORIENTATION: RIGHT;LEFT
ORIENTATION: RIGHT

## 2024-04-18 ASSESSMENT — PAIN SCALES - GENERAL
PAINLEVEL_OUTOF10: 7
PAINLEVEL_OUTOF10: 9
PAINLEVEL_OUTOF10: 0
PAINLEVEL_OUTOF10: 0

## 2024-04-18 ASSESSMENT — PAIN DESCRIPTION - DESCRIPTORS
DESCRIPTORS: DISCOMFORT
DESCRIPTORS: ACHING;DISCOMFORT;NAGGING

## 2024-04-18 ASSESSMENT — PAIN DESCRIPTION - LOCATION
LOCATION: HIP
LOCATION: HIP;ARM

## 2024-04-18 ASSESSMENT — PAIN - FUNCTIONAL ASSESSMENT: PAIN_FUNCTIONAL_ASSESSMENT: PREVENTS OR INTERFERES WITH MANY ACTIVE NOT PASSIVE ACTIVITIES

## 2024-04-18 NOTE — PROGRESS NOTES
Pts wife, Beata notified unit wanting update on pt. Provided Beata with update and answered all questions.

## 2024-04-18 NOTE — HOME CARE
Received referral for home care. Will need active home care orders on chart at time of discharge.    Thank You!  Keely Herrera Lpn  [facility]

## 2024-04-18 NOTE — PLAN OF CARE
Problem: ABCDS Injury Assessment  Goal: Absence of physical injury  4/17/2024 2044 by Vanessa Hsu RN  Outcome: Progressing  4/17/2024 0751 by Tiffani Quick RN  Outcome: Progressing     Problem: Discharge Planning  Goal: Discharge to home or other facility with appropriate resources  4/17/2024 2044 by Vanessa Hsu RN  Outcome: Progressing  4/17/2024 0751 by Tiffani Quick RN  Outcome: Progressing     Problem: Pain  Goal: Verbalizes/displays adequate comfort level or baseline comfort level  4/17/2024 2044 by Vanessa Hsu RN  Outcome: Progressing  4/17/2024 0751 by Tiffani Quick RN  Outcome: Progressing     Problem: Skin/Tissue Integrity  Goal: Absence of new skin breakdown  Description: 1.  Monitor for areas of redness and/or skin breakdown  2.  Assess vascular access sites hourly  3.  Every 4-6 hours minimum:  Change oxygen saturation probe site  4.  Every 4-6 hours:  If on nasal continuous positive airway pressure, respiratory therapy assess nares and determine need for appliance change or resting period.  Outcome: Progressing     Problem: Safety - Adult  Goal: Free from fall injury  Outcome: Progressing     Problem: Chronic Conditions and Co-morbidities  Goal: Patient's chronic conditions and co-morbidity symptoms are monitored and maintained or improved  Outcome: Progressing

## 2024-04-18 NOTE — PROGRESS NOTES
[facility] - Hospitalist   Progress Note    Admitting Date and Time: 4/16/2024  1:20 PM  Admit Dx: Unable to ambulate [R26.2]    Subjective:    Pt feels better today, less pain in R knee. No new issues.      ROS: denies fever, chills, cp, sob, n/v, HA unless stated above.     cetirizine  10 mg Oral Daily    And    pseudoephedrine  120 mg Oral BID    apixaban  5 mg Oral BID    citalopram  40 mg Oral Daily    clopidogrel  75 mg Oral Daily    ezetimibe  10 mg Oral Daily    fenofibrate  160 mg Oral Daily    metoprolol succinate  25 mg Oral Daily    therapeutic multivitamin-minerals  1 tablet Oral Daily    pantoprazole  40 mg Oral QAM AC    risperiDONE  0.25 mg Oral Daily    rosuvastatin  10 mg Oral Daily    spironolactone  25 mg Oral Daily    tamsulosin  0.4 mg Oral BID    vitamin D  50,000 Units Oral Weekly    insulin lispro  0-8 Units SubCUTAneous TID WC    insulin lispro  0-4 Units SubCUTAneous Nightly    sodium chloride flush  5-40 mL IntraVENous 2 times per day     HYDROcodone 5 mg - acetaminophen, 1 tablet, Q6H PRN  dextrose bolus, 125 mL, PRN   Or  dextrose bolus, 250 mL, PRN  glucagon (rDNA), 1 mg, PRN  dextrose, , Continuous PRN  sodium chloride flush, 5-40 mL, PRN  sodium chloride, , PRN  potassium chloride, 40 mEq, PRN   Or  potassium alternative oral replacement, 40 mEq, PRN   Or  potassium chloride, 10 mEq, PRN  magnesium sulfate, 2,000 mg, PRN  ondansetron, 4 mg, Q8H PRN   Or  ondansetron, 4 mg, Q6H PRN  polyethylene glycol, 17 g, Daily PRN  acetaminophen, 650 mg, Q6H PRN   Or  acetaminophen, 650 mg, Q6H PRN  Glucose, 15 g, PRN         Objective:    /67   Pulse 77   Temp 97.6 °F (36.4 °C) (Axillary)   Resp 18   Ht 1.829 m (6')   Wt 89.2 kg (196 lb 10.4 oz)   SpO2 96%   BMI 26.67 kg/m²     General Appearance: alert and oriented to person, place and time and in no acute distress  Skin: warm and dry  Head: normocephalic and atraumatic  Eyes: pupils equal, round, and reactive to light,  extraocular eye movements intact, conjunctivae normal  Neck: neck supple and non tender without mass   Pulmonary/Chest: clear to auscultation bilaterally- no wheezes, rales or rhonchi, normal air movement, no respiratory distress  Cardiovascular: normal rate, normal S1 and S2   Abdomen: soft, non-tender, non-distended, normal bowel sounds  Extremities: no cyanosis, no clubbing and no edema  Neurologic: no cranial nerve deficit and speech normal      Recent Labs     04/16/24 1749 04/17/24 0604 04/18/24  0647    139 137   K 4.4 4.3 4.0    103 102   CO2 23 22 24   BUN 23 19 17   CREATININE 0.9 1.0 1.0   GLUCOSE 115* 129* 135*   CALCIUM 10.2 9.8 9.9       Recent Labs     04/16/24 1749 04/17/24 0604 04/18/24  0647   ALKPHOS 41 35* 37*   PROT 7.4 7.1 7.2   BILITOT 0.3 0.4 0.5   AST 32 25 23   ALT 21 22 21       Recent Labs     04/16/24 1749 04/17/24 0604 04/18/24  0647   WBC 6.4 7.4 6.2   RBC 4.04 4.10 4.23   HGB 12.0* 12.1* 12.1*   HCT 36.4* 36.9* 37.9   MCV 90.1 90.0 89.6   MCH 29.7 29.5 28.6   MCHC 33.0 32.8 31.9*   RDW 13.2 13.4 13.4    214 207   MPV 10.6 10.5 10.2         Radiology:   XR HIP 2-3 VW W PELVIS RIGHT   Final Result   No acute osseous findings.      RECOMMENDATION:   Recommend short-term follow-up radiographs in 7-10 days or cross-sectional   imaging (CT/MRI) if there is persistent concern for fracture or the symptoms   persist.         XR KNEE RIGHT (MIN 4 VIEWS)   Final Result   No radiographic evidence for fracture.      RECOMMENDATION:   Recommend short-term follow-up radiographs in 7-10 days or cross-sectional   imaging (CT/MRI) if there is persistent concern for fracture or the symptoms   persist.         XR RADIUS ULNA LEFT (2 VIEWS)   Final Result   Diffuse soft tissue swelling.  No abnormal soft tissue gas.         CT HEAD WO CONTRAST   Final Result   No acute intracranial abnormality.         CT CERVICAL SPINE WO CONTRAST   Final Result   No acute abnormality of the

## 2024-04-18 NOTE — CONSULTS
Orthopaedic Consultation  Des Corea DO      CHIEF COMPLAINT: Right knee pain/instability    History of Present Illness: I was asked to see this patient due to feelings of giving way in the right knee.  Per the patient, he states that his knee will intermittently buckle on him.  He denies any underlying injury.  He is unable to verbalize how long he has been experiencing the symptoms.  He does not really have any pain at this time.  There is no specific or known injury.  Patient denies any previous treatment including injections or physical therapy.        Past Medical History:   Diagnosis Date    Allergic rhinitis     Arthritis     Baker's cyst of knee     right    CAD (coronary artery disease)     follows with Dr. CORDELIA Flores    Dental caries     Depression     DM (diabetes mellitus) (HCC)     GERD (gastroesophageal reflux disease)     Heart murmur     Hyperlipidemia     Hypertension     Intellectual delay     able to write name, limited reading / signs for self    Leg swelling     Lymphedema of both lower extremities 10/13/2022    PONV (postoperative nausea and vomiting)     Preoperative clearance 08/01/2017    cardiac clearance from Dr. CORDELIA Flores in EPIC notes    Prostate enlargement     Schizo affective schizophrenia (HCC)     stable    Seasonal allergies     Sleep apnea     no use cpap    Speech disorder     since birth    Vitamin D deficiency          Past Surgical History:   Procedure Laterality Date    APPENDECTOMY      APPENDECTOMY      CATARACT REMOVAL WITH IMPLANT Bilateral 2014    CORONARY ANGIOPLASTY WITH STENT PLACEMENT  early 2000's    CORONARY ANGIOPLASTY WITH STENT PLACEMENT      CYST REMOVAL      On top of his head    EYE SURGERY      Child- Lazy Eye    KNEE SURGERY  2004    Right Knee-orthoscopic surgery    TOOTH EXTRACTION  10/11/2017    full mouth extractions       Medications Prior to Admission:    Medications Prior to Admission: SYRINGE-NEEDLE, DISP, 3 ML 22G X 1-1/2\" 3 ML MISC, 1 each

## 2024-04-18 NOTE — PROGRESS NOTES
P Quality Flow/Interdisciplinary Rounds Progress Note        Quality Flow Rounds held on April 18, 2024    Disciplines Attending:  Bedside Nurse, , , and Nursing Unit Leadership    Kris Stallings was admitted on 4/16/2024  1:20 PM    Anticipated Discharge Date:  Expected Discharge Date: 04/18/24    Disposition: home    Gonzalo Score:  Gonzalo Scale Score: 20    Readmission Risk              Risk of Unplanned Readmission:  0           Discussed patient goal for the day, patient clinical progression, and barriers to discharge.  The following Goal(s) of the Day/Commitment(s) have been identified:   ortho plan and discharge      Kelsey Lovett RN  April 18, 2024

## 2024-04-18 NOTE — CARE COORDINATION
Social Work / Discharge PLanning: [facility] orders completed and if patient D/C after SW/CM hours, please call [facility] [phone] and leave VM that patient has completed orders and was discharged. WW delivered by [facility]. JENNIFER to follow. Electronically signed by ZUHAIR Wagner on 4/18/24 at 3:24 PM EDT

## 2024-04-19 VITALS
RESPIRATION RATE: 16 BRPM | HEIGHT: 72 IN | SYSTOLIC BLOOD PRESSURE: 117 MMHG | TEMPERATURE: 97.9 F | HEART RATE: 79 BPM | DIASTOLIC BLOOD PRESSURE: 63 MMHG | WEIGHT: 215.2 LBS | BODY MASS INDEX: 29.15 KG/M2 | OXYGEN SATURATION: 95 %

## 2024-04-19 LAB
ALBUMIN SERPL-MCNC: 4.7 G/DL (ref 3.5–5.2)
ALP SERPL-CCNC: 37 U/L (ref 40–129)
ALT SERPL-CCNC: 21 U/L (ref 0–40)
ANION GAP SERPL CALCULATED.3IONS-SCNC: 10 MMOL/L (ref 7–16)
AST SERPL-CCNC: 22 U/L (ref 0–39)
BASOPHILS # BLD: 0.04 K/UL (ref 0–0.2)
BASOPHILS NFR BLD: 1 % (ref 0–2)
BILIRUB SERPL-MCNC: 0.5 MG/DL (ref 0–1.2)
BUN SERPL-MCNC: 17 MG/DL (ref 6–23)
CALCIUM SERPL-MCNC: 9.7 MG/DL (ref 8.6–10.2)
CHLORIDE SERPL-SCNC: 102 MMOL/L (ref 98–107)
CO2 SERPL-SCNC: 26 MMOL/L (ref 22–29)
CREAT SERPL-MCNC: 1 MG/DL (ref 0.7–1.2)
EOSINOPHIL # BLD: 0.39 K/UL (ref 0.05–0.5)
EOSINOPHILS RELATIVE PERCENT: 6 % (ref 0–6)
ERYTHROCYTE [DISTWIDTH] IN BLOOD BY AUTOMATED COUNT: 13.4 % (ref 11.5–15)
GFR SERPL CREATININE-BSD FRML MDRD: 78 ML/MIN/1.73M2
GLUCOSE BLD-MCNC: 117 MG/DL (ref 74–99)
GLUCOSE BLD-MCNC: 146 MG/DL (ref 74–99)
GLUCOSE SERPL-MCNC: 121 MG/DL (ref 74–99)
HCT VFR BLD AUTO: 38.6 % (ref 37–54)
HGB BLD-MCNC: 12.7 G/DL (ref 12.5–16.5)
IMM GRANULOCYTES # BLD AUTO: <0.03 K/UL (ref 0–0.58)
IMM GRANULOCYTES NFR BLD: 0 % (ref 0–5)
LYMPHOCYTES NFR BLD: 1.48 K/UL (ref 1.5–4)
LYMPHOCYTES RELATIVE PERCENT: 22 % (ref 20–42)
MCH RBC QN AUTO: 29.5 PG (ref 26–35)
MCHC RBC AUTO-ENTMCNC: 32.9 G/DL (ref 32–34.5)
MCV RBC AUTO: 89.6 FL (ref 80–99.9)
MONOCYTES NFR BLD: 0.63 K/UL (ref 0.1–0.95)
MONOCYTES NFR BLD: 9 % (ref 2–12)
NEUTROPHILS NFR BLD: 63 % (ref 43–80)
NEUTS SEG NFR BLD: 4.33 K/UL (ref 1.8–7.3)
PLATELET # BLD AUTO: 217 K/UL (ref 130–450)
PMV BLD AUTO: 10.6 FL (ref 7–12)
POTASSIUM SERPL-SCNC: 3.9 MMOL/L (ref 3.5–5)
PROT SERPL-MCNC: 7.3 G/DL (ref 6.4–8.3)
RBC # BLD AUTO: 4.31 M/UL (ref 3.8–5.8)
SODIUM SERPL-SCNC: 138 MMOL/L (ref 132–146)
WBC OTHER # BLD: 6.9 K/UL (ref 4.5–11.5)

## 2024-04-19 PROCEDURE — 2580000003 HC RX 258: Performed by: INTERNAL MEDICINE

## 2024-04-19 PROCEDURE — G0378 HOSPITAL OBSERVATION PER HR: HCPCS

## 2024-04-19 PROCEDURE — 85025 COMPLETE CBC W/AUTO DIFF WBC: CPT

## 2024-04-19 PROCEDURE — 6370000000 HC RX 637 (ALT 250 FOR IP): Performed by: INTERNAL MEDICINE

## 2024-04-19 PROCEDURE — 99239 HOSP IP/OBS DSCHRG MGMT >30: CPT | Performed by: STUDENT IN AN ORGANIZED HEALTH CARE EDUCATION/TRAINING PROGRAM

## 2024-04-19 PROCEDURE — 80053 COMPREHEN METABOLIC PANEL: CPT

## 2024-04-19 PROCEDURE — 97530 THERAPEUTIC ACTIVITIES: CPT

## 2024-04-19 PROCEDURE — 82962 GLUCOSE BLOOD TEST: CPT

## 2024-04-19 PROCEDURE — 97535 SELF CARE MNGMENT TRAINING: CPT

## 2024-04-19 RX ADMIN — CITALOPRAM 40 MG: 20 TABLET, FILM COATED ORAL at 08:48

## 2024-04-19 RX ADMIN — APIXABAN 5 MG: 5 TABLET, FILM COATED ORAL at 08:48

## 2024-04-19 RX ADMIN — EZETIMIBE 10 MG: 10 TABLET ORAL at 08:48

## 2024-04-19 RX ADMIN — CLOPIDOGREL BISULFATE 75 MG: 75 TABLET ORAL at 08:48

## 2024-04-19 RX ADMIN — PSEUDOEPHEDRINE HCL 120 MG: 120 TABLET, FILM COATED, EXTENDED RELEASE ORAL at 08:48

## 2024-04-19 RX ADMIN — PANTOPRAZOLE SODIUM 40 MG: 40 TABLET, DELAYED RELEASE ORAL at 05:48

## 2024-04-19 RX ADMIN — SODIUM CHLORIDE, PRESERVATIVE FREE 10 ML: 5 INJECTION INTRAVENOUS at 08:49

## 2024-04-19 RX ADMIN — CETIRIZINE HYDROCHLORIDE 10 MG: 10 TABLET, FILM COATED ORAL at 08:48

## 2024-04-19 RX ADMIN — TAMSULOSIN HYDROCHLORIDE 0.4 MG: 0.4 CAPSULE ORAL at 08:48

## 2024-04-19 RX ADMIN — SPIRONOLACTONE 25 MG: 25 TABLET ORAL at 08:48

## 2024-04-19 RX ADMIN — RISPERIDONE 0.25 MG: 0.25 TABLET, FILM COATED ORAL at 08:48

## 2024-04-19 RX ADMIN — ROSUVASTATIN CALCIUM 10 MG: 10 TABLET, FILM COATED ORAL at 08:48

## 2024-04-19 RX ADMIN — METOPROLOL SUCCINATE 25 MG: 25 TABLET, EXTENDED RELEASE ORAL at 08:48

## 2024-04-19 RX ADMIN — FENOFIBRATE 160 MG: 160 TABLET ORAL at 08:48

## 2024-04-19 RX ADMIN — Medication 1 TABLET: at 08:48

## 2024-04-19 NOTE — PLAN OF CARE
Problem: ABCDS Injury Assessment  Goal: Absence of physical injury  4/18/2024 2209 by Vanessa Hsu RN  Outcome: Progressing  4/18/2024 1457 by Tiffani Quick RN  Outcome: Progressing     Problem: Discharge Planning  Goal: Discharge to home or other facility with appropriate resources  4/18/2024 2209 by Vanessa Hsu RN  Outcome: Progressing  4/18/2024 1457 by Tiffani Quick RN  Outcome: Progressing     Problem: Pain  Goal: Verbalizes/displays adequate comfort level or baseline comfort level  4/18/2024 2209 by Vanessa Hsu RN  Outcome: Progressing  4/18/2024 1457 by Tiffani Quick RN  Outcome: Progressing     Problem: Skin/Tissue Integrity  Goal: Absence of new skin breakdown  Description: 1.  Monitor for areas of redness and/or skin breakdown  2.  Assess vascular access sites hourly  3.  Every 4-6 hours minimum:  Change oxygen saturation probe site  4.  Every 4-6 hours:  If on nasal continuous positive airway pressure, respiratory therapy assess nares and determine need for appliance change or resting period.  4/18/2024 2209 by Vanessa Hsu RN  Outcome: Progressing  4/18/2024 1457 by Tiffani Quick RN  Outcome: Progressing     Problem: Safety - Adult  Goal: Free from fall injury  4/18/2024 2209 by Vanessa Hsu RN  Outcome: Progressing  4/18/2024 1457 by Tiffani Quick RN  Outcome: Progressing     Problem: Chronic Conditions and Co-morbidities  Goal: Patient's chronic conditions and co-morbidity symptoms are monitored and maintained or improved  4/18/2024 2209 by Vanessa Hsu RN  Outcome: Progressing  4/18/2024 1457 by Tiffani Quick RN  Outcome: Progressing

## 2024-04-19 NOTE — PROGRESS NOTES
Call placed to [facility] to follow up on ETA. They were unable to reach dispatch for ETA so they placed a reroute to expedite .    New  time 3:15 pm.

## 2024-04-19 NOTE — PLAN OF CARE
Problem: ABCDS Injury Assessment  Goal: Absence of physical injury  4/19/2024 1022 by Iza Burger RN  Outcome: Progressing  4/18/2024 2209 by Vanessa Hsu RN  Outcome: Progressing     Problem: Discharge Planning  Goal: Discharge to home or other facility with appropriate resources  4/19/2024 1022 by Iza Burger RN  Outcome: Progressing  4/18/2024 2209 by Vanessa Hsu RN  Outcome: Progressing     Problem: Pain  Goal: Verbalizes/displays adequate comfort level or baseline comfort level  4/19/2024 1022 by Iza Burger RN  Outcome: Progressing  4/18/2024 2209 by Vanessa Hsu RN  Outcome: Progressing     Problem: Skin/Tissue Integrity  Goal: Absence of new skin breakdown  Description: 1.  Monitor for areas of redness and/or skin breakdown  2.  Assess vascular access sites hourly  3.  Every 4-6 hours minimum:  Change oxygen saturation probe site  4.  Every 4-6 hours:  If on nasal continuous positive airway pressure, respiratory therapy assess nares and determine need for appliance change or resting period.  4/19/2024 1022 by Iza Burger RN  Outcome: Progressing  4/18/2024 2209 by Vanessa Hsu RN  Outcome: Progressing     Problem: Safety - Adult  Goal: Free from fall injury  4/19/2024 1022 by Iza Burger RN  Outcome: Progressing  4/18/2024 2209 by Vanessa Hsu RN  Outcome: Progressing     Problem: Chronic Conditions and Co-morbidities  Goal: Patient's chronic conditions and co-morbidity symptoms are monitored and maintained or improved  4/19/2024 1022 by Iza Burger RN  Outcome: Progressing  4/18/2024 2209 by Vanessa Hsu RN  Outcome: Progressing

## 2024-04-19 NOTE — DISCHARGE SUMMARY
[facility] - Hospitalist       Hospitalist Physician Discharge Summary       [facility]  [address] [facility] [address]  [phone]        Cici Agosto DO  [address] [facility] [place] [address] [phone]    Schedule an appointment as soon as possible for a visit in 1 week(s)      Des Corea, DO  [address]  [facility] [address]  [phone]    Follow up        Activity level: as tolerated    Diet: ADULT DIET; Regular; 4 carb choices (60 gm/meal); Low Sodium (2 gm)    Labs:as per PCP    Dispo:Home with [facility]    Condition at discharge: Stable      Patient ID:  Kris Stallings  [id number]  73 y.o.  1950    Admit date: 4/16/2024    Discharge date and time:  4/19/2024  10:28 AM    Admission Diagnoses: Principal Problem:    Unable to ambulate  Active Problems:    Hyperlipemia    Type 2 diabetes mellitus without complication, without long-term current use of insulin (HCC)    Chronic heart failure with preserved ejection fraction (HFpEF) (HCC)    CHRISTINA (obstructive sleep apnea)    Atrial fibrillation (HCC)    Right knee pain    Primary hypertension    Controlled type 2 diabetes mellitus without complication (HCC)  Resolved Problems:    * No resolved hospital problems. *      Discharge Diagnoses: Principal Problem:    Unable to ambulate  Active Problems:    Hyperlipemia    Type 2 diabetes mellitus without complication, without long-term current use of insulin (HCC)    Chronic heart failure with preserved ejection fraction (HFpEF) (HCC)    CHRISTINA (obstructive sleep apnea)    Atrial fibrillation (HCC)    Right knee pain    Primary hypertension    Controlled type 2 diabetes mellitus without complication (HCC)  Resolved Problems:    * No resolved hospital problems. *      Consults:  IP CONSULT TO CASE MANAGEMENT  IP CONSULT TO ORTHOPEDIC SURGERY  IP CONSULT TO PODIATRY  IP CONSULT TO HOME CARE NEEDS    Procedures: None    Hospital Course: Patient was admitted with

## 2024-04-19 NOTE — PROGRESS NOTES
Occupational Therapy  OT BEDSIDE TREATMENT NOTE      Date:2024  Patient Name: Kris Stallings  MRN: [id number]  : 1950  Room: [address] [place], [place] [address]         Evaluating OT: Lexus Mandel OTR/L   [id number]       Referring Provider:Emerson Giordano MD     Specific Provider Orders/Date:OT eval and treat 2024       Diagnosis:  Unable to ambulate [R26.2]     Pertinent Medical History: arthritis, DM, lymphedema, schizo  affective schizophrenia, intellectual delay       Precautions:  Fall Risk      Assessment of current deficits    [x] Functional mobility            [x]ADLs           [] Strength                  []Cognition    [x] Functional transfers          [x] IADLs         [x] Safety Awareness   [x]Endurance    [] Fine Coordination                         [x] Balance      [] Vision/perception   []Sensation      []Gross Motor Coordination             [] ROM           [] Delirium                   [] Motor Control      OT PLAN OF CARE   OT POC based on physician orders, patient diagnosis and results of clinical assessment     Frequency/Duration  2-4 days/wk for 2 - 4weeks PRN   Specific OT Treatment Interventions to include:   ADL retraining/adapted techniques and AE recommendations to increase functional independence within precautions                    Energy conservation techniques to improve tolerance for selfcare routine   Functional transfer/mobility training/DME recommendations for increased independence, safety and fall prevention         Patient/family education to increase safety and functional independence             Environmental modifications for safe mobility and completion of ADLs                             Therapeutic activity to improve functional performance during ADLs.                                         Therapeutic exercise to improve tolerance and functional strength for ADLs    Balance retraining/tolerance tasks for facilitation of postural control with dynamic challenges during ADLs .  the end of the session.     Education/treatment:  ADL retraining with facilitation of movement to increase self care skills. Therapeutic activity to address balance and endurance for ADL and transfers.  Pt education of walker safety and transfer safety.       Pt has made  progress towards set goals.       Time In: 905   Time Out: 930     Min Units   Therapeutic Ex 63074     Therapeutic Activities 38411 10 1   ADL/Self Care 19032 15 1   Orthotic Management 81074     Neuro Re-Ed 06481     Non-Billable Time     TOTAL TIMED TREATMENT 25 2         Deloris WADDELL/L [id number]

## 2024-04-19 NOTE — PROGRESS NOTES
SPIRITUAL HEALTH SERVICES - [facility] Encounter    Name: Kris Stallings                  Referral: Routine Visit    Sacraments  Anointed (Last Rites): No  Apostolic [place]: No  Confession: No  Communion: No     Assessment:  Patient was already discharged.      Intervention:  None.     Outcome:  None.    Plan:  None.      Electronically signed by Chaplain Nancy, on 4/19/2024 at 4:38 PM.  Spiritual Care Department  [facility]  [phone]

## 2024-04-19 NOTE — PROGRESS NOTES
Heart monitor and IV removed. CMR notified. Discharge paperwork reviewed with patient. Verbalized understanding. Assisted patient in getting dressed and ready for discharge. Awaiting transport.

## 2024-04-19 NOTE — PROGRESS NOTES
P Quality Flow/Interdisciplinary Rounds Progress Note        Quality Flow Rounds held on April 19, 2024    Disciplines Attending:  Bedside Nurse, , , and Nursing Unit Leadership    Kris Stallings was admitted on 4/16/2024  1:20 PM    Anticipated Discharge Date:  Expected Discharge Date: 04/18/24    Disposition:    Gonzalo Score:  Gonzalo Scale Score: 20    Readmission Risk              Risk of Unplanned Readmission:  0           Discussed patient goal for the day, patient clinical progression, and barriers to discharge.  The following Goal(s) of the Day/Commitment(s) have been identified:   discharge.      Karly Ross RN  April 19, 2024

## 2024-04-19 NOTE — CARE COORDINATION
Social Work / Discharge PLanning : D/C order noted and [facility] updated. WW delivered .SW to follow. Electronically signed by ZUHAIR Wagner on 4/19/24 at 11:16 AM EDT

## 2024-04-19 NOTE — PROGRESS NOTES
Transport with [facility] set up with  time of 1:20 pm. Patient and wife Beata notified.    [facility] reference number [id number]

## 2024-04-23 ENCOUNTER — TELEPHONE (OUTPATIENT)
Dept: ENDOCRINOLOGY | Age: 74
End: 2024-04-23

## 2024-04-23 NOTE — TELEPHONE ENCOUNTER
Patient wife called stated she wasn't sure when he needed to have labs drawn regarding his his testosterone shots.       I returned her call only able to leave a message told her mid way between shot   Asked her for return call

## 2024-05-08 ENCOUNTER — HOSPITAL ENCOUNTER (OUTPATIENT)
Age: 74
Discharge: HOME | End: 2024-05-08
Payer: MEDICARE

## 2024-05-08 DIAGNOSIS — E29.1 MALE HYPOGONADISM: ICD-10-CM

## 2024-05-08 PROCEDURE — 84270 ASSAY OF SEX HORMONE GLOBUL: CPT

## 2024-05-08 PROCEDURE — 36415 COLL VENOUS BLD VENIPUNCTURE: CPT

## 2024-05-08 PROCEDURE — 84403 ASSAY OF TOTAL TESTOSTERONE: CPT

## 2024-05-09 LAB
SHBG SERPL-SCNC: 39 NMOL/L (ref 19–76)
TESTOST FREE MFR SERPL: 41.7 PG/ML (ref 47–244)
TESTOST SERPL-MCNC: 240 NG/DL (ref 193–740)
TESTOSTERONE, BIOAVAILABLE: 97.8 NG/DL (ref 130–680)

## 2024-05-12 ENCOUNTER — TELEPHONE (OUTPATIENT)
Dept: ENDOCRINOLOGY | Age: 74
End: 2024-05-12

## 2024-05-12 DIAGNOSIS — E29.1 MALE HYPOGONADISM: ICD-10-CM

## 2024-05-12 RX ORDER — TESTOSTERONE CYPIONATE 200 MG/ML
INJECTION, SOLUTION INTRAMUSCULAR
Qty: 6 ML | Refills: 1 | Status: SHIPPED | OUTPATIENT
Start: 2024-05-12 | End: 2024-10-17

## 2024-05-12 NOTE — TELEPHONE ENCOUNTER
Notify patient  Your testosterone level is much better than before but still below goal.  Please confirm he is currently taking half mL of 200 mg/mL testosterone concentration every 14 days for total of 100 mg every 14 days.  If so, I recommend change to 0.75 mL every 14 days for 150 mg every 14 days.    We will repeat testosterone level and mid August.  Please make sure you get the blood work and the midway between the injection

## 2024-05-14 ENCOUNTER — TELEPHONE (OUTPATIENT)
Dept: ENDOCRINOLOGY | Age: 74
End: 2024-05-14

## 2024-05-14 NOTE — TELEPHONE ENCOUNTER
Since pt started started taking testosterone in April, he has been sleeping all day and much more lethargic per wife. Dosage confirmed and correct

## 2024-05-15 NOTE — TELEPHONE ENCOUNTER
I doubt if his current symptomatology from testosterone.  Testosterone if anything it should make him feel better and give him more energy not the opposite.  If he does not feel well on it, we can just simply stop it

## 2024-07-08 RX ORDER — CLOPIDOGREL BISULFATE 75 MG/1
75 TABLET ORAL DAILY
Qty: 90 TABLET | Refills: 3 | Status: SHIPPED | OUTPATIENT
Start: 2024-07-08

## 2024-08-30 ENCOUNTER — HOSPITAL ENCOUNTER (OUTPATIENT)
Age: 74
Discharge: HOME | End: 2024-08-30
Payer: MEDICARE

## 2024-08-30 DIAGNOSIS — E29.1 MALE HYPOGONADISM: ICD-10-CM

## 2024-08-30 LAB
HCT VFR BLD AUTO: 43.6 % (ref 37–54)
PSA SERPL-MCNC: 0.24 NG/ML (ref 0–4)

## 2024-08-30 PROCEDURE — 84270 ASSAY OF SEX HORMONE GLOBUL: CPT

## 2024-08-30 PROCEDURE — 84403 ASSAY OF TOTAL TESTOSTERONE: CPT

## 2024-08-30 PROCEDURE — G0103 PSA SCREENING: HCPCS

## 2024-08-30 PROCEDURE — 85014 HEMATOCRIT: CPT

## 2024-08-30 PROCEDURE — 36415 COLL VENOUS BLD VENIPUNCTURE: CPT

## 2024-09-01 LAB
SHBG SERPL-SCNC: 41 NMOL/L (ref 19–76)
TESTOST FREE MFR SERPL: 102.2 PG/ML (ref 47–244)
TESTOST SERPL-MCNC: 549 NG/DL (ref 193–740)
TESTOSTERONE, BIOAVAILABLE: 239.5 NG/DL (ref 130–680)

## 2024-09-02 ENCOUNTER — TELEPHONE (OUTPATIENT)
Dept: ENDOCRINOLOGY | Age: 74
End: 2024-09-02

## 2024-09-17 ENCOUNTER — OFFICE VISIT (OUTPATIENT)
Dept: CARDIOLOGY CLINIC | Age: 74
End: 2024-09-17
Payer: MEDICARE

## 2024-09-17 VITALS
HEART RATE: 84 BPM | SYSTOLIC BLOOD PRESSURE: 126 MMHG | RESPIRATION RATE: 17 BRPM | BODY MASS INDEX: 29.39 KG/M2 | HEIGHT: 72 IN | WEIGHT: 217 LBS | DIASTOLIC BLOOD PRESSURE: 84 MMHG

## 2024-09-17 DIAGNOSIS — I25.10 CORONARY ARTERY DISEASE INVOLVING NATIVE CORONARY ARTERY OF NATIVE HEART WITHOUT ANGINA PECTORIS: Primary | ICD-10-CM

## 2024-09-17 PROCEDURE — 99214 OFFICE O/P EST MOD 30 MIN: CPT | Performed by: INTERNAL MEDICINE

## 2024-09-17 PROCEDURE — 1123F ACP DISCUSS/DSCN MKR DOCD: CPT | Performed by: INTERNAL MEDICINE

## 2024-09-17 PROCEDURE — 3079F DIAST BP 80-89 MM HG: CPT | Performed by: INTERNAL MEDICINE

## 2024-09-17 PROCEDURE — G8417 CALC BMI ABV UP PARAM F/U: HCPCS | Performed by: INTERNAL MEDICINE

## 2024-09-17 PROCEDURE — G8427 DOCREV CUR MEDS BY ELIG CLIN: HCPCS | Performed by: INTERNAL MEDICINE

## 2024-09-17 PROCEDURE — 1036F TOBACCO NON-USER: CPT | Performed by: INTERNAL MEDICINE

## 2024-09-17 PROCEDURE — 3074F SYST BP LT 130 MM HG: CPT | Performed by: INTERNAL MEDICINE

## 2024-09-17 PROCEDURE — 3017F COLORECTAL CA SCREEN DOC REV: CPT | Performed by: INTERNAL MEDICINE

## 2024-09-17 PROCEDURE — 93000 ELECTROCARDIOGRAM COMPLETE: CPT | Performed by: INTERNAL MEDICINE

## 2024-10-09 ENCOUNTER — OFFICE VISIT (OUTPATIENT)
Dept: ENDOCRINOLOGY | Age: 74
End: 2024-10-09
Payer: MEDICARE

## 2024-10-09 VITALS
OXYGEN SATURATION: 97 % | BODY MASS INDEX: 29.66 KG/M2 | SYSTOLIC BLOOD PRESSURE: 104 MMHG | WEIGHT: 219 LBS | HEART RATE: 70 BPM | DIASTOLIC BLOOD PRESSURE: 65 MMHG | HEIGHT: 72 IN

## 2024-10-09 DIAGNOSIS — E55.9 VITAMIN D DEFICIENCY: Primary | ICD-10-CM

## 2024-10-09 DIAGNOSIS — E29.1 MALE HYPOGONADISM: ICD-10-CM

## 2024-10-09 PROCEDURE — 3074F SYST BP LT 130 MM HG: CPT | Performed by: INTERNAL MEDICINE

## 2024-10-09 PROCEDURE — 99214 OFFICE O/P EST MOD 30 MIN: CPT | Performed by: INTERNAL MEDICINE

## 2024-10-09 PROCEDURE — 1123F ACP DISCUSS/DSCN MKR DOCD: CPT | Performed by: INTERNAL MEDICINE

## 2024-10-09 PROCEDURE — 1036F TOBACCO NON-USER: CPT | Performed by: INTERNAL MEDICINE

## 2024-10-09 PROCEDURE — G8484 FLU IMMUNIZE NO ADMIN: HCPCS | Performed by: INTERNAL MEDICINE

## 2024-10-09 PROCEDURE — G8427 DOCREV CUR MEDS BY ELIG CLIN: HCPCS | Performed by: INTERNAL MEDICINE

## 2024-10-09 PROCEDURE — G8417 CALC BMI ABV UP PARAM F/U: HCPCS | Performed by: INTERNAL MEDICINE

## 2024-10-09 PROCEDURE — 3078F DIAST BP <80 MM HG: CPT | Performed by: INTERNAL MEDICINE

## 2024-10-09 RX ORDER — TESTOSTERONE CYPIONATE 200 MG/ML
INJECTION, SOLUTION INTRAMUSCULAR
Qty: 6 ML | Refills: 2 | Status: SHIPPED | OUTPATIENT
Start: 2024-10-09 | End: 2025-03-16

## 2024-10-09 RX ORDER — TESTOSTERONE CYPIONATE 200 MG/ML
INJECTION, SOLUTION INTRAMUSCULAR
Qty: 6 ML | OUTPATIENT
Start: 2024-10-09

## 2024-10-09 NOTE — PROGRESS NOTES
[phone]  -----------------------------  An electronic signature was used to authenticate this note. Jose M Schneider MD on 10/9/2024 at 12:05 PM

## 2024-10-09 NOTE — TELEPHONE ENCOUNTER
Patient called stating that he was seen by Dr. Madalyn Dan earlier this year for TCM/Hospital follow up for Diverticulitis. Patient reports he was doing well until 2 days ago, begin developing consistent pain to lower abdomen that comes and goes at times. Uncertain if he is running a fever or not. Decreased appetite. Diarrhea present. Dr. Madalyn Dan agrees that patient should be seen today. Writer offered same day appointment and patient will talk with boss to be excused early from work to come to this appointment. Appointment scheduled. Testosterone refill, called to pharmacy

## 2024-12-26 DIAGNOSIS — E29.1 MALE HYPOGONADISM: ICD-10-CM

## 2024-12-26 RX ORDER — TESTOSTERONE CYPIONATE 200 MG/ML
INJECTION, SOLUTION INTRAMUSCULAR
Qty: 6 ML | Refills: 2 | Status: CANCELLED | OUTPATIENT
Start: 2024-12-26 | End: 2025-06-02

## 2024-12-27 ENCOUNTER — HOSPITAL ENCOUNTER (OUTPATIENT)
Age: 74
Discharge: HOME | End: 2024-12-27
Payer: MEDICARE

## 2024-12-27 LAB — PSA SERPL-MCNC: 0.18 NG/ML (ref 0–4)

## 2024-12-27 PROCEDURE — 84153 ASSAY OF PSA TOTAL: CPT

## 2025-02-19 DIAGNOSIS — I50.9 CONGESTIVE HEART FAILURE, UNSPECIFIED HF CHRONICITY, UNSPECIFIED HEART FAILURE TYPE (HCC): ICD-10-CM

## 2025-02-19 DIAGNOSIS — I25.10 CORONARY ARTERY DISEASE INVOLVING NATIVE CORONARY ARTERY OF NATIVE HEART WITHOUT ANGINA PECTORIS: ICD-10-CM

## 2025-02-19 RX ORDER — SPIRONOLACTONE 25 MG/1
25 TABLET ORAL DAILY
Qty: 90 TABLET | Refills: 3 | Status: SHIPPED | OUTPATIENT
Start: 2025-02-19

## 2025-02-28 NOTE — ED NOTES
Pt SPO2 is 85 % on RA and then placed on 6 L NC and spo2 is up to 93%     Magalie Edwards RN  11/29/21 3040 3 = A little assistance

## 2025-03-17 ENCOUNTER — HOSPITAL ENCOUNTER (OUTPATIENT)
Age: 75
Discharge: HOME | End: 2025-03-17
Payer: MEDICARE

## 2025-03-17 LAB
ALBUMIN SERPL-MCNC: 4.6 G/DL (ref 3.5–5.2)
ALP SERPL-CCNC: 36 U/L (ref 40–129)
ALT SERPL-CCNC: 19 U/L (ref 0–40)
ANION GAP SERPL CALCULATED.3IONS-SCNC: 11 MMOL/L (ref 7–16)
AST SERPL-CCNC: 22 U/L (ref 0–39)
BASOPHILS # BLD: 0.03 K/UL (ref 0–0.2)
BASOPHILS NFR BLD: 1 % (ref 0–2)
BILIRUB SERPL-MCNC: 0.3 MG/DL (ref 0–1.2)
BUN SERPL-MCNC: 18 MG/DL (ref 6–23)
CALCIUM SERPL-MCNC: 10 MG/DL (ref 8.6–10.2)
CHLORIDE SERPL-SCNC: 105 MMOL/L (ref 98–107)
CHOLEST SERPL-MCNC: 153 MG/DL
CO2 SERPL-SCNC: 25 MMOL/L (ref 22–29)
CREAT SERPL-MCNC: 1 MG/DL (ref 0.7–1.2)
EOSINOPHIL # BLD: 0.5 K/UL (ref 0.05–0.5)
EOSINOPHILS RELATIVE PERCENT: 9 % (ref 0–6)
ERYTHROCYTE [DISTWIDTH] IN BLOOD BY AUTOMATED COUNT: 13.7 % (ref 11.5–15)
GFR, ESTIMATED: 81 ML/MIN/1.73M2
GLUCOSE SERPL-MCNC: 148 MG/DL (ref 74–99)
HBA1C MFR BLD: 7.1 % (ref 4–5.6)
HCT VFR BLD AUTO: 39.4 % (ref 37–54)
HDLC SERPL-MCNC: 49 MG/DL
HGB BLD-MCNC: 12.5 G/DL (ref 12.5–16.5)
IMM GRANULOCYTES # BLD AUTO: <0.03 K/UL (ref 0–0.58)
IMM GRANULOCYTES NFR BLD: 0 % (ref 0–5)
LDLC SERPL CALC-MCNC: 68 MG/DL
LYMPHOCYTES NFR BLD: 1.38 K/UL (ref 1.5–4)
LYMPHOCYTES RELATIVE PERCENT: 24 % (ref 20–42)
MCH RBC QN AUTO: 28.9 PG (ref 26–35)
MCHC RBC AUTO-ENTMCNC: 31.7 G/DL (ref 32–34.5)
MCV RBC AUTO: 91.2 FL (ref 80–99.9)
MONOCYTES NFR BLD: 0.47 K/UL (ref 0.1–0.95)
MONOCYTES NFR BLD: 8 % (ref 2–12)
NEUTROPHILS NFR BLD: 59 % (ref 43–80)
NEUTS SEG NFR BLD: 3.45 K/UL (ref 1.8–7.3)
PLATELET # BLD AUTO: 199 K/UL (ref 130–450)
PMV BLD AUTO: 10.8 FL (ref 7–12)
POTASSIUM SERPL-SCNC: 4.7 MMOL/L (ref 3.5–5)
PROT SERPL-MCNC: 7.5 G/DL (ref 6.4–8.3)
PSA SERPL-MCNC: 0.03 NG/ML (ref 0–4)
RBC # BLD AUTO: 4.32 M/UL (ref 3.8–5.8)
SODIUM SERPL-SCNC: 141 MMOL/L (ref 132–146)
TRIGL SERPL-MCNC: 182 MG/DL
VLDLC SERPL CALC-MCNC: 36 MG/DL
WBC OTHER # BLD: 5.9 K/UL (ref 4.5–11.5)

## 2025-03-17 PROCEDURE — G0103 PSA SCREENING: HCPCS

## 2025-03-17 PROCEDURE — 80061 LIPID PANEL: CPT

## 2025-03-17 PROCEDURE — 83036 HEMOGLOBIN GLYCOSYLATED A1C: CPT

## 2025-03-17 PROCEDURE — 85025 COMPLETE CBC W/AUTO DIFF WBC: CPT

## 2025-03-17 PROCEDURE — 80053 COMPREHEN METABOLIC PANEL: CPT

## 2025-03-31 DIAGNOSIS — E29.1 MALE HYPOGONADISM: ICD-10-CM

## 2025-03-31 RX ORDER — TESTOSTERONE CYPIONATE 200 MG/ML
INJECTION, SOLUTION INTRAMUSCULAR
Qty: 6 ML | Refills: 0 | Status: SHIPPED | OUTPATIENT
Start: 2025-03-31 | End: 2025-09-05

## 2025-03-31 NOTE — TELEPHONE ENCOUNTER
Testostetone to [facility]     10/9/24: At goal, we will continue testosterone 150 mg every 14 days

## 2025-06-12 DIAGNOSIS — E29.1 MALE HYPOGONADISM: ICD-10-CM

## 2025-06-12 RX ORDER — TESTOSTERONE CYPIONATE 200 MG/ML
INJECTION, SOLUTION INTRAMUSCULAR
Qty: 6 ML | Refills: 0 | Status: SHIPPED | OUTPATIENT
Start: 2025-06-12 | End: 2025-09-12

## 2025-09-05 RX ORDER — CLOPIDOGREL BISULFATE 75 MG/1
75 TABLET ORAL DAILY
Qty: 90 TABLET | Refills: 3 | Status: SHIPPED | OUTPATIENT
Start: 2025-09-05